# Patient Record
Sex: MALE | Race: BLACK OR AFRICAN AMERICAN | NOT HISPANIC OR LATINO | Employment: OTHER | URBAN - METROPOLITAN AREA
[De-identification: names, ages, dates, MRNs, and addresses within clinical notes are randomized per-mention and may not be internally consistent; named-entity substitution may affect disease eponyms.]

---

## 2018-05-22 PROBLEM — Z12.11 SCREENING FOR COLORECTAL CANCER: Status: ACTIVE | Noted: 2018-05-22

## 2018-05-22 PROBLEM — Z12.12 SCREENING FOR COLORECTAL CANCER: Status: ACTIVE | Noted: 2018-05-22

## 2018-05-29 ENCOUNTER — TELEPHONE (OUTPATIENT)
Dept: SMOKING CESSATION | Facility: CLINIC | Age: 63
End: 2018-05-29

## 2018-05-29 ENCOUNTER — CLINICAL SUPPORT (OUTPATIENT)
Dept: SMOKING CESSATION | Facility: CLINIC | Age: 63
End: 2018-05-29
Payer: COMMERCIAL

## 2018-05-29 DIAGNOSIS — F17.210 MODERATE SMOKER (20 OR LESS PER DAY): Primary | ICD-10-CM

## 2018-05-29 PROCEDURE — 99404 PREV MED CNSL INDIV APPRX 60: CPT | Mod: S$GLB,,,

## 2018-05-29 RX ORDER — VARENICLINE TARTRATE 0.5 (11)-1
KIT ORAL
Qty: 1 PACKAGE | Refills: 0 | Status: SHIPPED | OUTPATIENT
Start: 2018-05-29 | End: 2018-06-29

## 2018-05-29 NOTE — TELEPHONE ENCOUNTER
Attempted to contact pt to inform pt that appt will be held in Admit Dept; unable to leave message

## 2018-05-29 NOTE — PROGRESS NOTES
Patient currently smoking a pack per day and will begin 1:1 cessation therapy with CTTS. Patient will begin prescribed tobacco cessation medication regimen of starter pack of Chantix.

## 2018-07-09 ENCOUNTER — TELEPHONE (OUTPATIENT)
Dept: SMOKING CESSATION | Facility: CLINIC | Age: 63
End: 2018-07-09

## 2018-08-17 ENCOUNTER — TELEPHONE (OUTPATIENT)
Dept: SMOKING CESSATION | Facility: CLINIC | Age: 63
End: 2018-08-17

## 2018-09-05 ENCOUNTER — TELEPHONE (OUTPATIENT)
Dept: SMOKING CESSATION | Facility: CLINIC | Age: 63
End: 2018-09-05

## 2018-09-18 ENCOUNTER — TELEPHONE (OUTPATIENT)
Dept: SMOKING CESSATION | Facility: CLINIC | Age: 63
End: 2018-09-18

## 2018-11-12 ENCOUNTER — TELEPHONE (OUTPATIENT)
Dept: SMOKING CESSATION | Facility: CLINIC | Age: 63
End: 2018-11-12

## 2018-11-26 ENCOUNTER — TELEPHONE (OUTPATIENT)
Dept: SMOKING CESSATION | Facility: CLINIC | Age: 63
End: 2018-11-26

## 2019-06-18 PROBLEM — J44.1 COPD EXACERBATION: Status: ACTIVE | Noted: 2019-06-18

## 2019-06-18 PROBLEM — I10 HTN (HYPERTENSION): Status: ACTIVE | Noted: 2019-06-18

## 2019-06-18 PROBLEM — Z72.0 TOBACCO ABUSE: Status: ACTIVE | Noted: 2019-06-18

## 2019-06-18 PROBLEM — J44.9 COPD (CHRONIC OBSTRUCTIVE PULMONARY DISEASE): Status: ACTIVE | Noted: 2019-06-18

## 2019-06-18 PROBLEM — F10.10 ALCOHOL ABUSE: Status: ACTIVE | Noted: 2019-06-18

## 2019-06-18 PROBLEM — F19.10 DRUG ABUSE: Status: ACTIVE | Noted: 2019-06-18

## 2019-06-18 PROBLEM — E78.5 HLD (HYPERLIPIDEMIA): Status: ACTIVE | Noted: 2019-06-18

## 2019-06-18 PROBLEM — R07.9 CHEST PAIN: Status: ACTIVE | Noted: 2019-06-18

## 2019-06-21 ENCOUNTER — CLINICAL SUPPORT (OUTPATIENT)
Dept: SMOKING CESSATION | Facility: CLINIC | Age: 64
End: 2019-06-21
Payer: COMMERCIAL

## 2019-06-21 DIAGNOSIS — F17.200 NICOTINE DEPENDENCE: Primary | ICD-10-CM

## 2019-06-21 PROCEDURE — 99407 BEHAV CHNG SMOKING > 10 MIN: CPT | Mod: S$GLB,,,

## 2019-06-21 PROCEDURE — 99407 PR TOBACCO USE CESSATION INTENSIVE >10 MINUTES: ICD-10-PCS | Mod: S$GLB,,,

## 2019-06-21 NOTE — PROGRESS NOTES
Successful contact with patient regarding tobacco cessation quit #1. Pt states, he was unable to become tobacco free, he currently smoke three cigarettes per day, and he's determined to quit. Pt schedule for quit #2 on 7/11/2019. Pt informed of his benefit status, future telephone follow ups, and contact information. Will update the tobacco cessation smart form for 12 month on quit #1 and resolve the episode.

## 2019-07-15 ENCOUNTER — TELEPHONE (OUTPATIENT)
Dept: SMOKING CESSATION | Facility: CLINIC | Age: 64
End: 2019-07-15

## 2019-08-08 ENCOUNTER — TELEPHONE (OUTPATIENT)
Dept: SMOKING CESSATION | Facility: CLINIC | Age: 64
End: 2019-08-08

## 2019-09-09 PROBLEM — J44.9 STAGE 2 MODERATE COPD BY GOLD CLASSIFICATION: Chronic | Status: ACTIVE | Noted: 2019-06-18

## 2021-02-17 ENCOUNTER — HISTORICAL (OUTPATIENT)
Dept: ADMINISTRATIVE | Facility: HOSPITAL | Age: 66
End: 2021-02-17

## 2021-02-17 LAB — SARS-COV-2 RNA AMPLIFICATION, QUAL: NEGATIVE

## 2022-01-01 ENCOUNTER — ANESTHESIA EVENT (OUTPATIENT)
Dept: SURGERY | Facility: HOSPITAL | Age: 67
End: 2022-01-01
Payer: MEDICARE

## 2022-01-01 ENCOUNTER — OFFICE VISIT (OUTPATIENT)
Dept: SPINE | Facility: CLINIC | Age: 67
End: 2022-01-01
Payer: MEDICARE

## 2022-01-01 ENCOUNTER — HOSPITAL ENCOUNTER (OUTPATIENT)
Dept: RADIOLOGY | Facility: HOSPITAL | Age: 67
Discharge: HOME | End: 2022-05-16
Attending: ORTHOPAEDIC SURGERY
Payer: MEDICARE

## 2022-01-01 ENCOUNTER — HOSPITAL ENCOUNTER (EMERGENCY)
Facility: HOSPITAL | Age: 67
Discharge: HOME | End: 2022-11-17
Attending: FAMILY MEDICINE
Payer: MEDICARE

## 2022-01-01 ENCOUNTER — HOSPITAL ENCOUNTER (OUTPATIENT)
Dept: RADIOLOGY | Facility: HOSPITAL | Age: 67
Discharge: HOME | End: 2022-07-05
Attending: NURSE PRACTITIONER
Payer: MEDICARE

## 2022-01-01 ENCOUNTER — OUTSIDE PLACE OF SERVICE (OUTPATIENT)
Dept: SURGERY | Facility: CLINIC | Age: 67
End: 2022-01-01
Payer: MEDICARE

## 2022-01-01 ENCOUNTER — TELEPHONE (OUTPATIENT)
Dept: EMERGENCY MEDICINE | Facility: HOSPITAL | Age: 67
End: 2022-01-01
Payer: MEDICARE

## 2022-01-01 ENCOUNTER — HOSPITAL ENCOUNTER (INPATIENT)
Facility: HOSPITAL | Age: 67
LOS: 8 days | DRG: 871 | End: 2022-12-03
Attending: EMERGENCY MEDICINE | Admitting: INTERNAL MEDICINE
Payer: MEDICARE

## 2022-01-01 ENCOUNTER — TELEPHONE (OUTPATIENT)
Dept: FAMILY MEDICINE | Facility: CLINIC | Age: 67
End: 2022-01-01
Payer: MEDICARE

## 2022-01-01 ENCOUNTER — OFFICE VISIT (OUTPATIENT)
Dept: FAMILY MEDICINE | Facility: CLINIC | Age: 67
End: 2022-01-01
Payer: MEDICARE

## 2022-01-01 ENCOUNTER — HOSPITAL ENCOUNTER (OUTPATIENT)
Dept: RADIOLOGY | Facility: HOSPITAL | Age: 67
Discharge: HOME | End: 2022-10-27
Attending: NURSE PRACTITIONER
Payer: MEDICARE

## 2022-01-01 ENCOUNTER — ANESTHESIA (OUTPATIENT)
Dept: SURGERY | Facility: HOSPITAL | Age: 67
End: 2022-01-01
Payer: MEDICARE

## 2022-01-01 ENCOUNTER — LAB REQUISITION (OUTPATIENT)
Dept: LAB | Facility: HOSPITAL | Age: 67
End: 2022-01-01
Payer: MEDICARE

## 2022-01-01 ENCOUNTER — CLINICAL SUPPORT (OUTPATIENT)
Dept: CARDIOLOGY | Facility: CLINIC | Age: 67
End: 2022-01-01
Attending: SURGERY
Payer: MEDICARE

## 2022-01-01 ENCOUNTER — OFFICE VISIT (OUTPATIENT)
Dept: SURGERY | Facility: CLINIC | Age: 67
End: 2022-01-01
Attending: SURGERY
Payer: MEDICARE

## 2022-01-01 ENCOUNTER — HOSPITAL ENCOUNTER (OUTPATIENT)
Dept: RADIOLOGY | Facility: HOSPITAL | Age: 67
Discharge: HOME | End: 2022-04-08
Attending: NURSE PRACTITIONER
Payer: COMMERCIAL

## 2022-01-01 ENCOUNTER — OFFICE VISIT (OUTPATIENT)
Dept: OTOLARYNGOLOGY | Facility: CLINIC | Age: 67
End: 2022-01-01
Payer: MEDICARE

## 2022-01-01 ENCOUNTER — HOSPITAL ENCOUNTER (EMERGENCY)
Facility: HOSPITAL | Age: 67
Discharge: HOME | End: 2022-04-21
Attending: FAMILY MEDICINE
Payer: MEDICARE

## 2022-01-01 ENCOUNTER — OFFICE VISIT (OUTPATIENT)
Dept: FAMILY MEDICINE | Facility: CLINIC | Age: 67
End: 2022-01-01
Payer: MEDICAID

## 2022-01-01 ENCOUNTER — HOSPITAL ENCOUNTER (OUTPATIENT)
Facility: HOSPITAL | Age: 67
Discharge: HOME | End: 2022-08-18
Attending: ORTHOPAEDIC SURGERY | Admitting: ORTHOPAEDIC SURGERY
Payer: MEDICARE

## 2022-01-01 ENCOUNTER — HOSPITAL ENCOUNTER (OUTPATIENT)
Dept: RADIOLOGY | Facility: HOSPITAL | Age: 67
Discharge: HOME | End: 2022-03-09
Attending: ORTHOPAEDIC SURGERY
Payer: MEDICARE

## 2022-01-01 VITALS
HEART RATE: 126 BPM | RESPIRATION RATE: 16 BRPM | DIASTOLIC BLOOD PRESSURE: 95 MMHG | BODY MASS INDEX: 17.72 KG/M2 | TEMPERATURE: 97 F | SYSTOLIC BLOOD PRESSURE: 123 MMHG | OXYGEN SATURATION: 100 % | WEIGHT: 120 LBS

## 2022-01-01 VITALS
DIASTOLIC BLOOD PRESSURE: 82 MMHG | HEART RATE: 87 BPM | TEMPERATURE: 97 F | SYSTOLIC BLOOD PRESSURE: 130 MMHG | OXYGEN SATURATION: 95 % | RESPIRATION RATE: 18 BRPM | HEIGHT: 69 IN | BODY MASS INDEX: 17.77 KG/M2 | WEIGHT: 120 LBS

## 2022-01-01 VITALS
SYSTOLIC BLOOD PRESSURE: 132 MMHG | BODY MASS INDEX: 17.77 KG/M2 | WEIGHT: 120 LBS | TEMPERATURE: 98 F | HEART RATE: 83 BPM | RESPIRATION RATE: 27 BRPM | DIASTOLIC BLOOD PRESSURE: 87 MMHG | OXYGEN SATURATION: 95 % | HEIGHT: 69 IN

## 2022-01-01 VITALS
BODY MASS INDEX: 16.74 KG/M2 | HEART RATE: 71 BPM | DIASTOLIC BLOOD PRESSURE: 88 MMHG | TEMPERATURE: 98 F | OXYGEN SATURATION: 95 % | SYSTOLIC BLOOD PRESSURE: 150 MMHG | WEIGHT: 113 LBS | HEIGHT: 69 IN | RESPIRATION RATE: 18 BRPM

## 2022-01-01 VITALS
TEMPERATURE: 98 F | RESPIRATION RATE: 18 BRPM | HEART RATE: 66 BPM | OXYGEN SATURATION: 100 % | WEIGHT: 118.38 LBS | HEIGHT: 69 IN | SYSTOLIC BLOOD PRESSURE: 140 MMHG | DIASTOLIC BLOOD PRESSURE: 90 MMHG | BODY MASS INDEX: 17.53 KG/M2

## 2022-01-01 VITALS — WEIGHT: 118 LBS | BODY MASS INDEX: 17.48 KG/M2 | HEIGHT: 69 IN

## 2022-01-01 VITALS
WEIGHT: 114 LBS | RESPIRATION RATE: 18 BRPM | TEMPERATURE: 98 F | BODY MASS INDEX: 16.88 KG/M2 | HEART RATE: 85 BPM | HEIGHT: 69 IN | SYSTOLIC BLOOD PRESSURE: 152 MMHG | OXYGEN SATURATION: 86 % | DIASTOLIC BLOOD PRESSURE: 98 MMHG

## 2022-01-01 VITALS
HEART RATE: 71 BPM | WEIGHT: 126.13 LBS | RESPIRATION RATE: 17 BRPM | BODY MASS INDEX: 18.06 KG/M2 | DIASTOLIC BLOOD PRESSURE: 38 MMHG | HEIGHT: 70 IN | OXYGEN SATURATION: 100 % | SYSTOLIC BLOOD PRESSURE: 72 MMHG | TEMPERATURE: 99 F

## 2022-01-01 VITALS
RESPIRATION RATE: 20 BRPM | HEART RATE: 81 BPM | BODY MASS INDEX: 17.33 KG/M2 | HEIGHT: 69 IN | DIASTOLIC BLOOD PRESSURE: 122 MMHG | SYSTOLIC BLOOD PRESSURE: 186 MMHG | TEMPERATURE: 98 F | WEIGHT: 117 LBS | OXYGEN SATURATION: 97 %

## 2022-01-01 DIAGNOSIS — M25.422 ELBOW SWELLING, LEFT: ICD-10-CM

## 2022-01-01 DIAGNOSIS — Z12.11 SCREENING FOR COLORECTAL CANCER: ICD-10-CM

## 2022-01-01 DIAGNOSIS — R07.9 CHEST PAIN: ICD-10-CM

## 2022-01-01 DIAGNOSIS — R10.9 ABDOMINAL PAIN, UNSPECIFIED ABDOMINAL LOCATION: Primary | ICD-10-CM

## 2022-01-01 DIAGNOSIS — K11.8 SUBMANDIBULAR DUCT OBSTRUCTION: Primary | ICD-10-CM

## 2022-01-01 DIAGNOSIS — Z11.52 ENCOUNTER FOR SCREENING LABORATORY TESTING FOR COVID-19 VIRUS: ICD-10-CM

## 2022-01-01 DIAGNOSIS — K40.00 BILATERAL INGUINAL HERNIA, WITH OBSTRUCTION, WITHOUT GANGRENE, NOT SPECIFIED AS RECURRENT: ICD-10-CM

## 2022-01-01 DIAGNOSIS — K40.90 UNILATERAL INGUINAL HERNIA WITHOUT OBSTRUCTION OR GANGRENE, RECURRENCE NOT SPECIFIED: Primary | ICD-10-CM

## 2022-01-01 DIAGNOSIS — S30.1XXA ABDOMINAL WALL SEROMA, INITIAL ENCOUNTER: Primary | ICD-10-CM

## 2022-01-01 DIAGNOSIS — M70.22 OLECRANON BURSITIS OF LEFT ELBOW: ICD-10-CM

## 2022-01-01 DIAGNOSIS — R79.89 ELEVATED TROPONIN: ICD-10-CM

## 2022-01-01 DIAGNOSIS — M54.12 CERVICAL RADICULOPATHY: ICD-10-CM

## 2022-01-01 DIAGNOSIS — Z72.0 TOBACCO ABUSE: ICD-10-CM

## 2022-01-01 DIAGNOSIS — J02.9 SORE THROAT: Primary | ICD-10-CM

## 2022-01-01 DIAGNOSIS — R63.4 WEIGHT LOSS: ICD-10-CM

## 2022-01-01 DIAGNOSIS — J11.1 INFLUENZA: Primary | ICD-10-CM

## 2022-01-01 DIAGNOSIS — K59.00 CONSTIPATION, UNSPECIFIED CONSTIPATION TYPE: ICD-10-CM

## 2022-01-01 DIAGNOSIS — I46.9 CARDIAC ARREST: ICD-10-CM

## 2022-01-01 DIAGNOSIS — R10.9 ABDOMINAL PAIN, UNSPECIFIED ABDOMINAL LOCATION: ICD-10-CM

## 2022-01-01 DIAGNOSIS — M70.22 OLECRANON BURSITIS OF LEFT ELBOW: Primary | Chronic | ICD-10-CM

## 2022-01-01 DIAGNOSIS — Z11.52 ENCOUNTER FOR SCREENING FOR COVID-19: Primary | ICD-10-CM

## 2022-01-01 DIAGNOSIS — R06.02 SOB (SHORTNESS OF BREATH) ON EXERTION: ICD-10-CM

## 2022-01-01 DIAGNOSIS — R10.11 RIGHT UPPER QUADRANT ABDOMINAL PAIN: Primary | ICD-10-CM

## 2022-01-01 DIAGNOSIS — R59.0 LOCALIZED ENLARGED LYMPH NODES: ICD-10-CM

## 2022-01-01 DIAGNOSIS — K92.1 BLACK STOOLS: ICD-10-CM

## 2022-01-01 DIAGNOSIS — K43.9 HERNIA OF ABDOMINAL WALL: ICD-10-CM

## 2022-01-01 DIAGNOSIS — K11.8 SUBMANDIBULAR DUCT OBSTRUCTION: ICD-10-CM

## 2022-01-01 DIAGNOSIS — K40.90 UNILATERAL INGUINAL HERNIA WITHOUT OBSTRUCTION OR GANGRENE, RECURRENCE NOT SPECIFIED: ICD-10-CM

## 2022-01-01 DIAGNOSIS — I10 HTN (HYPERTENSION): ICD-10-CM

## 2022-01-01 DIAGNOSIS — K59.00 CONSTIPATION: ICD-10-CM

## 2022-01-01 DIAGNOSIS — M25.422 ELBOW EFFUSION, LEFT: ICD-10-CM

## 2022-01-01 DIAGNOSIS — M54.12 CERVICAL RADICULOPATHY: Primary | ICD-10-CM

## 2022-01-01 DIAGNOSIS — I46.9 CARDIOPULMONARY ARREST: ICD-10-CM

## 2022-01-01 DIAGNOSIS — N17.9 AKI (ACUTE KIDNEY INJURY): ICD-10-CM

## 2022-01-01 DIAGNOSIS — Z12.12 SCREENING FOR COLORECTAL CANCER: ICD-10-CM

## 2022-01-01 DIAGNOSIS — M50.30 DDD (DEGENERATIVE DISC DISEASE), CERVICAL: ICD-10-CM

## 2022-01-01 DIAGNOSIS — F19.10 DRUG ABUSE: ICD-10-CM

## 2022-01-01 DIAGNOSIS — R10.11 RIGHT UPPER QUADRANT ABDOMINAL PAIN: ICD-10-CM

## 2022-01-01 DIAGNOSIS — R06.02 SOB (SHORTNESS OF BREATH): ICD-10-CM

## 2022-01-01 DIAGNOSIS — J44.9 STAGE 2 MODERATE COPD BY GOLD CLASSIFICATION: Chronic | ICD-10-CM

## 2022-01-01 DIAGNOSIS — E78.5 HLD (HYPERLIPIDEMIA): ICD-10-CM

## 2022-01-01 DIAGNOSIS — M25.522 LEFT ELBOW PAIN: ICD-10-CM

## 2022-01-01 DIAGNOSIS — F10.10 ALCOHOL ABUSE: ICD-10-CM

## 2022-01-01 DIAGNOSIS — I42.9 CARDIOMYOPATHY, UNSPECIFIED TYPE: ICD-10-CM

## 2022-01-01 DIAGNOSIS — I50.814 BIVENTRICULAR HEART FAILURE WITH REDUCED LEFT VENTRICULAR FUNCTION: ICD-10-CM

## 2022-01-01 LAB
ABO + RH BLD: NORMAL
ABO + RH BLD: NORMAL
ABORH RETYPE: NORMAL
ALBUMIN SERPL BCP-MCNC: 1.9 G/DL (ref 3.5–5)
ALBUMIN SERPL BCP-MCNC: 1.9 G/DL (ref 3.5–5)
ALBUMIN SERPL BCP-MCNC: 2 G/DL (ref 3.5–5)
ALBUMIN SERPL BCP-MCNC: 2.4 G/DL (ref 3.5–5)
ALBUMIN SERPL BCP-MCNC: 2.6 G/DL (ref 3.5–5)
ALBUMIN SERPL BCP-MCNC: 2.6 G/DL (ref 3.5–5)
ALBUMIN SERPL BCP-MCNC: 2.7 G/DL (ref 3.5–5)
ALBUMIN SERPL BCP-MCNC: 2.8 G/DL (ref 3.5–5)
ALBUMIN SERPL BCP-MCNC: 2.8 G/DL (ref 3.5–5)
ALBUMIN SERPL BCP-MCNC: 3.5 G/DL (ref 3.5–5)
ALBUMIN SERPL BCP-MCNC: 3.6 G/DL (ref 3.5–5)
ALBUMIN SERPL BCP-MCNC: 3.9 G/DL (ref 3.5–5)
ALBUMIN/GLOB SERPL: 0.5 {RATIO}
ALBUMIN/GLOB SERPL: 0.6 {RATIO}
ALBUMIN/GLOB SERPL: 0.6 {RATIO}
ALBUMIN/GLOB SERPL: 0.7 {RATIO}
ALBUMIN/GLOB SERPL: 0.8 {RATIO}
ALBUMIN/GLOB SERPL: 0.8 {RATIO}
ALBUMIN/GLOB SERPL: 1 {RATIO}
ALP SERPL-CCNC: 102 U/L (ref 45–115)
ALP SERPL-CCNC: 107 U/L (ref 45–115)
ALP SERPL-CCNC: 60 U/L (ref 45–115)
ALP SERPL-CCNC: 61 U/L (ref 45–115)
ALP SERPL-CCNC: 66 U/L (ref 45–115)
ALP SERPL-CCNC: 73 U/L (ref 45–115)
ALP SERPL-CCNC: 73 U/L (ref 45–115)
ALP SERPL-CCNC: 78 U/L (ref 45–115)
ALP SERPL-CCNC: 87 U/L (ref 45–115)
ALP SERPL-CCNC: 87 U/L (ref 45–115)
ALP SERPL-CCNC: 89 U/L (ref 45–115)
ALP SERPL-CCNC: 93 U/L (ref 45–115)
ALT SERPL W P-5'-P-CCNC: 122 U/L (ref 16–61)
ALT SERPL W P-5'-P-CCNC: 20 U/L (ref 16–61)
ALT SERPL W P-5'-P-CCNC: 24 U/L (ref 16–61)
ALT SERPL W P-5'-P-CCNC: 32 U/L (ref 16–61)
ALT SERPL W P-5'-P-CCNC: 48 U/L (ref 16–61)
ALT SERPL W P-5'-P-CCNC: 48 U/L (ref 16–61)
ALT SERPL W P-5'-P-CCNC: 49 U/L (ref 16–61)
ALT SERPL W P-5'-P-CCNC: 54 U/L (ref 16–61)
ALT SERPL W P-5'-P-CCNC: 59 U/L (ref 16–61)
ALT SERPL W P-5'-P-CCNC: 69 U/L (ref 16–61)
ALT SERPL W P-5'-P-CCNC: 76 U/L (ref 16–61)
ALT SERPL W P-5'-P-CCNC: 85 U/L (ref 16–61)
AMPHET UR QL SCN: NEGATIVE
AMYLASE SERPL-CCNC: 55 U/L (ref 25–115)
ANION GAP SERPL CALCULATED.3IONS-SCNC: 10 MMOL/L (ref 7–16)
ANION GAP SERPL CALCULATED.3IONS-SCNC: 11 MMOL/L (ref 7–16)
ANION GAP SERPL CALCULATED.3IONS-SCNC: 12 MMOL/L (ref 7–16)
ANION GAP SERPL CALCULATED.3IONS-SCNC: 13 MMOL/L (ref 7–16)
ANION GAP SERPL CALCULATED.3IONS-SCNC: 14 MMOL/L (ref 7–16)
ANION GAP SERPL CALCULATED.3IONS-SCNC: 17 MMOL/L (ref 7–16)
ANION GAP SERPL CALCULATED.3IONS-SCNC: 9 MMOL/L (ref 7–16)
ANISOCYTOSIS BLD QL SMEAR: ABNORMAL
AORTIC ROOT ANNULUS: 2.6 CM
AORTIC VALVE CUSP SEPERATION: 1.48 CM
APTT PPP: 119.9 SECONDS (ref 25.2–37.3)
APTT PPP: 184.1 SECONDS (ref 25.2–37.3)
APTT PPP: 28.1 SECONDS (ref 25.2–37.3)
APTT PPP: 46.1 SECONDS (ref 25.2–37.3)
APTT PPP: 61 SECONDS (ref 25.2–37.3)
APTT PPP: 70.4 SECONDS (ref 25.2–37.3)
APTT PPP: 71.3 SECONDS (ref 25.2–37.3)
APTT PPP: >200 SECONDS (ref 25.2–37.3)
AST SERPL W P-5'-P-CCNC: 14 U/L (ref 15–37)
AST SERPL W P-5'-P-CCNC: 156 U/L (ref 15–37)
AST SERPL W P-5'-P-CCNC: 19 U/L (ref 15–37)
AST SERPL W P-5'-P-CCNC: 231 U/L (ref 15–37)
AST SERPL W P-5'-P-CCNC: 29 U/L (ref 15–37)
AST SERPL W P-5'-P-CCNC: 46 U/L (ref 15–37)
AST SERPL W P-5'-P-CCNC: 47 U/L (ref 15–37)
AST SERPL W P-5'-P-CCNC: 49 U/L (ref 15–37)
AST SERPL W P-5'-P-CCNC: 62 U/L (ref 15–37)
AST SERPL W P-5'-P-CCNC: 64 U/L (ref 15–37)
AST SERPL W P-5'-P-CCNC: 65 U/L (ref 15–37)
AST SERPL W P-5'-P-CCNC: 94 U/L (ref 15–37)
AV INDEX (PROSTH): 0.63
AV MEAN GRADIENT: 1 MMHG
AV PEAK GRADIENT: 1 MMHG
AV VALVE AREA: 2.16 CM2
AV VELOCITY RATIO: 0.67
BACTERIA #/AREA URNS HPF: ABNORMAL /HPF
BACTERIA BLD CULT: NORMAL
BARBITURATES UR QL SCN: NEGATIVE
BASOPHILS # BLD AUTO: 0 K/UL (ref 0–0.2)
BASOPHILS # BLD AUTO: 0 K/UL (ref 0–0.2)
BASOPHILS # BLD AUTO: 0.01 K/UL (ref 0–0.2)
BASOPHILS # BLD AUTO: 0.02 K/UL (ref 0–0.2)
BASOPHILS # BLD AUTO: 0.03 K/UL (ref 0–0.2)
BASOPHILS # BLD AUTO: 0.04 K/UL (ref 0–0.2)
BASOPHILS # BLD AUTO: 0.06 K/UL (ref 0–0.2)
BASOPHILS # BLD AUTO: 0.07 K/UL (ref 0–0.2)
BASOPHILS NFR BLD AUTO: 0 % (ref 0–1)
BASOPHILS NFR BLD AUTO: 0 % (ref 0–1)
BASOPHILS NFR BLD AUTO: 0.1 % (ref 0–1)
BASOPHILS NFR BLD AUTO: 0.5 % (ref 0–1)
BASOPHILS NFR BLD AUTO: 0.6 % (ref 0–1)
BASOPHILS NFR BLD AUTO: 0.8 % (ref 0–1)
BASOPHILS NFR BLD AUTO: 1.1 % (ref 0–1)
BENZODIAZ METAB UR QL SCN: POSITIVE
BILIRUB DIRECT SERPL-MCNC: 0.2 MG/DL (ref 0–0.2)
BILIRUB SERPL-MCNC: 0.2 MG/DL (ref 0–1.2)
BILIRUB SERPL-MCNC: 0.4 MG/DL (ref ?–1.2)
BILIRUB SERPL-MCNC: 0.4 MG/DL (ref ?–1.2)
BILIRUB SERPL-MCNC: 0.5 MG/DL (ref 0–1.2)
BILIRUB SERPL-MCNC: 0.5 MG/DL (ref ?–1.2)
BILIRUB SERPL-MCNC: 0.6 MG/DL (ref ?–1.2)
BILIRUB SERPL-MCNC: 0.7 MG/DL (ref ?–1.2)
BILIRUB UR QL STRIP: NEGATIVE
BILIRUB UR QL STRIP: NEGATIVE
BLD PROD TYP BPU: NORMAL
BLD PROD TYP BPU: NORMAL
BLOOD UNIT EXPIRATION DATE: NORMAL
BLOOD UNIT EXPIRATION DATE: NORMAL
BLOOD UNIT TYPE CODE: 5100
BLOOD UNIT TYPE CODE: 5100
BUN SERPL-MCNC: 11 MG/DL (ref 7–18)
BUN SERPL-MCNC: 19 MG/DL (ref 7–18)
BUN SERPL-MCNC: 19 MG/DL (ref 7–18)
BUN SERPL-MCNC: 26 MG/DL (ref 7–18)
BUN SERPL-MCNC: 28 MG/DL (ref 7–18)
BUN SERPL-MCNC: 28 MG/DL (ref 7–18)
BUN SERPL-MCNC: 33 MG/DL (ref 7–18)
BUN SERPL-MCNC: 34 MG/DL (ref 7–18)
BUN SERPL-MCNC: 34 MG/DL (ref 7–18)
BUN SERPL-MCNC: 35 MG/DL (ref 7–18)
BUN SERPL-MCNC: 37 MG/DL (ref 7–18)
BUN SERPL-MCNC: 44 MG/DL (ref 7–18)
BUN SERPL-MCNC: 55 MG/DL (ref 7–18)
BUN SERPL-MCNC: 59 MG/DL (ref 7–18)
BUN SERPL-MCNC: 61 MG/DL (ref 7–18)
BUN SERPL-MCNC: 63 MG/DL (ref 7–18)
BUN SERPL-MCNC: 75 MG/DL (ref 7–18)
BUN SERPL-MCNC: 9 MG/DL (ref 7–18)
BUN/CREAT SERPL: 11 (ref 6–20)
BUN/CREAT SERPL: 11 (ref 6–20)
BUN/CREAT SERPL: 15 (ref 6–20)
BUN/CREAT SERPL: 17 (ref 6–20)
BUN/CREAT SERPL: 17 (ref 6–20)
BUN/CREAT SERPL: 18 (ref 6–20)
BUN/CREAT SERPL: 18 (ref 6–20)
BUN/CREAT SERPL: 19 (ref 6–20)
BUN/CREAT SERPL: 20 (ref 6–20)
BUN/CREAT SERPL: 22 (ref 6–20)
BUN/CREAT SERPL: 22 (ref 6–20)
BUN/CREAT SERPL: 23 (ref 6–20)
BUN/CREAT SERPL: 25 (ref 6–20)
CALCIUM SERPL-MCNC: 10.1 MG/DL (ref 8.5–10.1)
CALCIUM SERPL-MCNC: 7.2 MG/DL (ref 8.5–10.1)
CALCIUM SERPL-MCNC: 7.6 MG/DL (ref 8.5–10.1)
CALCIUM SERPL-MCNC: 7.7 MG/DL (ref 8.5–10.1)
CALCIUM SERPL-MCNC: 7.7 MG/DL (ref 8.5–10.1)
CALCIUM SERPL-MCNC: 7.9 MG/DL (ref 8.5–10.1)
CALCIUM SERPL-MCNC: 7.9 MG/DL (ref 8.5–10.1)
CALCIUM SERPL-MCNC: 8 MG/DL (ref 8.5–10.1)
CALCIUM SERPL-MCNC: 8.1 MG/DL (ref 8.5–10.1)
CALCIUM SERPL-MCNC: 8.1 MG/DL (ref 8.5–10.1)
CALCIUM SERPL-MCNC: 8.3 MG/DL (ref 8.5–10.1)
CALCIUM SERPL-MCNC: 8.3 MG/DL (ref 8.5–10.1)
CALCIUM SERPL-MCNC: 8.4 MG/DL (ref 8.5–10.1)
CALCIUM SERPL-MCNC: 8.5 MG/DL (ref 8.5–10.1)
CALCIUM SERPL-MCNC: 8.8 MG/DL (ref 8.5–10.1)
CALCIUM SERPL-MCNC: 8.9 MG/DL (ref 8.5–10.1)
CALCIUM SERPL-MCNC: 9.3 MG/DL (ref 8.5–10.1)
CALCIUM SERPL-MCNC: 9.7 MG/DL (ref 8.5–10.1)
CANNABINOIDS UR QL SCN: NEGATIVE
CHLORIDE SERPL-SCNC: 101 MMOL/L (ref 98–107)
CHLORIDE SERPL-SCNC: 101 MMOL/L (ref 98–107)
CHLORIDE SERPL-SCNC: 102 MMOL/L (ref 98–107)
CHLORIDE SERPL-SCNC: 102 MMOL/L (ref 98–107)
CHLORIDE SERPL-SCNC: 103 MMOL/L (ref 98–107)
CHLORIDE SERPL-SCNC: 104 MMOL/L (ref 98–107)
CHLORIDE SERPL-SCNC: 105 MMOL/L (ref 98–107)
CHLORIDE SERPL-SCNC: 106 MMOL/L (ref 98–107)
CHLORIDE SERPL-SCNC: 107 MMOL/L (ref 98–107)
CHLORIDE SERPL-SCNC: 93 MMOL/L (ref 98–107)
CHLORIDE SERPL-SCNC: 94 MMOL/L (ref 98–107)
CHLORIDE SERPL-SCNC: 95 MMOL/L (ref 98–107)
CHLORIDE SERPL-SCNC: 95 MMOL/L (ref 98–107)
CHLORIDE SERPL-SCNC: 96 MMOL/L (ref 98–107)
CHLORIDE SERPL-SCNC: 99 MMOL/L (ref 98–107)
CHLORIDE SERPL-SCNC: 99 MMOL/L (ref 98–107)
CK SERPL-CCNC: 274 U/L (ref 39–308)
CLARITY UR: CLEAR
CLARITY UR: CLEAR
CO2 SERPL-SCNC: 25 MMOL/L (ref 21–32)
CO2 SERPL-SCNC: 25 MMOL/L (ref 21–32)
CO2 SERPL-SCNC: 27 MMOL/L (ref 21–32)
CO2 SERPL-SCNC: 28 MMOL/L (ref 21–32)
CO2 SERPL-SCNC: 29 MMOL/L (ref 21–32)
CO2 SERPL-SCNC: 30 MMOL/L (ref 21–32)
CO2 SERPL-SCNC: 32 MMOL/L (ref 21–32)
CO2 SERPL-SCNC: 33 MMOL/L (ref 21–32)
CO2 SERPL-SCNC: 34 MMOL/L (ref 21–32)
COCAINE UR QL SCN: POSITIVE
COLOR UR: NORMAL
COLOR UR: YELLOW
CREAT SERPL-MCNC: 0.84 MG/DL (ref 0.7–1.3)
CREAT SERPL-MCNC: 0.94 MG/DL (ref 0.7–1.3)
CREAT SERPL-MCNC: 0.97 MG/DL (ref 0.7–1.3)
CREAT SERPL-MCNC: 1.09 MG/DL (ref 0.7–1.3)
CREAT SERPL-MCNC: 1.32 MG/DL (ref 0.7–1.3)
CREAT SERPL-MCNC: 1.34 MG/DL (ref 0.7–1.3)
CREAT SERPL-MCNC: 1.45 MG/DL (ref 0.7–1.3)
CREAT SERPL-MCNC: 1.46 MG/DL (ref 0.7–1.3)
CREAT SERPL-MCNC: 1.51 MG/DL (ref 0.7–1.3)
CREAT SERPL-MCNC: 1.52 MG/DL (ref 0.7–1.3)
CREAT SERPL-MCNC: 1.71 MG/DL (ref 0.7–1.3)
CREAT SERPL-MCNC: 1.9 MG/DL (ref 0.7–1.3)
CREAT SERPL-MCNC: 2.51 MG/DL (ref 0.7–1.3)
CREAT SERPL-MCNC: 3.21 MG/DL (ref 0.7–1.3)
CREAT SERPL-MCNC: 3.34 MG/DL (ref 0.7–1.3)
CREAT SERPL-MCNC: 3.37 MG/DL (ref 0.7–1.3)
CREAT SERPL-MCNC: 3.57 MG/DL (ref 0.7–1.3)
CREAT SERPL-MCNC: 3.72 MG/DL (ref 0.7–1.3)
CRENATED CELLS: ABNORMAL
CRENATED CELLS: ABNORMAL
CROSSMATCH INTERPRETATION: NORMAL
CROSSMATCH INTERPRETATION: NORMAL
CRP SERPL-MCNC: 9.23 MG/DL (ref 0–0.8)
CTP QC/QA: YES
CTP QC/QA: YES
CULTURE, LOWER RESPIRATORY: ABNORMAL
CV ECHO LV RWT: 0.8 CM
D DIMER PPP FEU-MCNC: 6.26 ΜG/ML (ref 0–0.47)
DHEA SERPL-MCNC: NORMAL
DIFFERENTIAL METHOD BLD: ABNORMAL
DISPENSE STATUS: NORMAL
DISPENSE STATUS: NORMAL
DOP CALC AO PEAK VEL: 0.6 M/S
DOP CALC AO VTI: 8 CM
DOP CALC LVOT AREA: 3.5 CM2
DOP CALC LVOT DIAMETER: 2.1 CM
DOP CALC LVOT PEAK VEL: 0.4 M/S
DOP CALC LVOT STROKE VOLUME: 17.31 CM3
DOP CALC MV VTI: 96 CM
DOP CALCLVOT PEAK VEL VTI: 5 CM
E WAVE DECELERATION TIME: 167 MSEC
ECHO EF ESTIMATED: 25 %
ECHO LV POSTERIOR WALL: 1.47 CM (ref 0.6–1.1)
EGFR (NO RACE VARIABLE) (RUSH/TITUS): 17 ML/MIN/1.73M²
EGFR (NO RACE VARIABLE) (RUSH/TITUS): 18 ML/MIN/1.73M²
EGFR (NO RACE VARIABLE) (RUSH/TITUS): 19 ML/MIN/1.73M²
EGFR (NO RACE VARIABLE) (RUSH/TITUS): 19 ML/MIN/1.73M²
EGFR (NO RACE VARIABLE) (RUSH/TITUS): 20 ML/MIN/1.73M²
EGFR (NO RACE VARIABLE) (RUSH/TITUS): 27 ML/MIN/1.73M²
EGFR (NO RACE VARIABLE) (RUSH/TITUS): 38 ML/MIN/1.73M²
EGFR (NO RACE VARIABLE) (RUSH/TITUS): 43 ML/MIN/1.73M²
EGFR (NO RACE VARIABLE) (RUSH/TITUS): 49 ML/MIN/1.73M²
EGFR (NO RACE VARIABLE) (RUSH/TITUS): 50 ML/MIN/1.73M²
EGFR (NO RACE VARIABLE) (RUSH/TITUS): 52 ML/MIN/1.73M²
EGFR (NO RACE VARIABLE) (RUSH/TITUS): 52 ML/MIN/1.73M²
EGFR (NO RACE VARIABLE) (RUSH/TITUS): 57 ML/MIN/1.73M²
EGFR (NO RACE VARIABLE) (RUSH/TITUS): 58 ML/MIN/1.73M²
EGFR (NO RACE VARIABLE) (RUSH/TITUS): 85 ML/MIN/1.73M²
EGFR (NO RACE VARIABLE) (RUSH/TITUS): 95 ML/MIN/1.73M²
EJECTION FRACTION: 25 %
EOSINOPHIL # BLD AUTO: 0 K/UL (ref 0–0.5)
EOSINOPHIL # BLD AUTO: 0.01 K/UL (ref 0–0.5)
EOSINOPHIL # BLD AUTO: 0.03 K/UL (ref 0–0.5)
EOSINOPHIL # BLD AUTO: 0.03 K/UL (ref 0–0.5)
EOSINOPHIL # BLD AUTO: 0.04 K/UL (ref 0–0.5)
EOSINOPHIL # BLD AUTO: 0.09 K/UL (ref 0–0.5)
EOSINOPHIL # BLD AUTO: 0.18 K/UL (ref 0–0.5)
EOSINOPHIL NFR BLD AUTO: 0 % (ref 1–4)
EOSINOPHIL NFR BLD AUTO: 0.1 % (ref 1–4)
EOSINOPHIL NFR BLD AUTO: 0.2 % (ref 1–4)
EOSINOPHIL NFR BLD AUTO: 0.4 % (ref 1–4)
EOSINOPHIL NFR BLD AUTO: 0.6 % (ref 1–4)
EOSINOPHIL NFR BLD AUTO: 1.6 % (ref 1–4)
EOSINOPHIL NFR BLD AUTO: 2.9 % (ref 1–4)
ERYTHROCYTE [DISTWIDTH] IN BLOOD BY AUTOMATED COUNT: 13 % (ref 11.5–14.5)
ERYTHROCYTE [DISTWIDTH] IN BLOOD BY AUTOMATED COUNT: 13.2 % (ref 11.5–14.5)
ERYTHROCYTE [DISTWIDTH] IN BLOOD BY AUTOMATED COUNT: 13.5 % (ref 11.5–14.5)
ERYTHROCYTE [DISTWIDTH] IN BLOOD BY AUTOMATED COUNT: 13.7 % (ref 11.5–14.5)
ERYTHROCYTE [DISTWIDTH] IN BLOOD BY AUTOMATED COUNT: 13.9 % (ref 11.5–14.5)
ERYTHROCYTE [DISTWIDTH] IN BLOOD BY AUTOMATED COUNT: 13.9 % (ref 11.5–14.5)
ERYTHROCYTE [DISTWIDTH] IN BLOOD BY AUTOMATED COUNT: 14.1 % (ref 11.5–14.5)
ERYTHROCYTE [DISTWIDTH] IN BLOOD BY AUTOMATED COUNT: 14.1 % (ref 11.5–14.5)
ERYTHROCYTE [DISTWIDTH] IN BLOOD BY AUTOMATED COUNT: 14.4 % (ref 11.5–14.5)
ERYTHROCYTE [DISTWIDTH] IN BLOOD BY AUTOMATED COUNT: 14.5 % (ref 11.5–14.5)
ERYTHROCYTE [DISTWIDTH] IN BLOOD BY AUTOMATED COUNT: 14.6 % (ref 11.5–14.5)
ERYTHROCYTE [SEDIMENTATION RATE] IN BLOOD BY WESTERGREN METHOD: 20 MM/HR (ref 0–20)
EST. AVERAGE GLUCOSE BLD GHB EST-MCNC: 110 MG/DL
ESTROGEN SERPL-MCNC: NORMAL PG/ML
ESTROGEN SERPL-MCNC: NORMAL PG/ML
FLUAV AG NPH QL: POSITIVE
FLUBV AG NPH QL: NEGATIVE
FRACTIONAL SHORTENING: 15 % (ref 28–44)
GLOBULIN SER-MCNC: 3.3 G/DL (ref 2–4)
GLOBULIN SER-MCNC: 3.4 G/DL (ref 2–4)
GLOBULIN SER-MCNC: 3.5 G/DL (ref 2–4)
GLOBULIN SER-MCNC: 3.5 G/DL (ref 2–4)
GLOBULIN SER-MCNC: 3.6 G/DL (ref 2–4)
GLOBULIN SER-MCNC: 3.6 G/DL (ref 2–4)
GLOBULIN SER-MCNC: 3.7 G/DL (ref 2–4)
GLOBULIN SER-MCNC: 3.8 G/DL (ref 2–4)
GLOBULIN SER-MCNC: 3.8 G/DL (ref 2–4)
GLOBULIN SER-MCNC: 3.9 G/DL (ref 2–4)
GLOBULIN SER-MCNC: 4 G/DL (ref 2–4)
GLUCOSE SERPL-MCNC: 110 MG/DL (ref 74–106)
GLUCOSE SERPL-MCNC: 118 MG/DL (ref 74–106)
GLUCOSE SERPL-MCNC: 120 MG/DL (ref 70–105)
GLUCOSE SERPL-MCNC: 121 MG/DL (ref 70–105)
GLUCOSE SERPL-MCNC: 130 MG/DL (ref 74–106)
GLUCOSE SERPL-MCNC: 131 MG/DL (ref 74–106)
GLUCOSE SERPL-MCNC: 134 MG/DL (ref 74–106)
GLUCOSE SERPL-MCNC: 139 MG/DL (ref 74–106)
GLUCOSE SERPL-MCNC: 141 MG/DL (ref 74–106)
GLUCOSE SERPL-MCNC: 145 MG/DL (ref 70–105)
GLUCOSE SERPL-MCNC: 145 MG/DL (ref 74–106)
GLUCOSE SERPL-MCNC: 149 MG/DL (ref 74–106)
GLUCOSE SERPL-MCNC: 151 MG/DL (ref 74–106)
GLUCOSE SERPL-MCNC: 153 MG/DL (ref 74–106)
GLUCOSE SERPL-MCNC: 158 MG/DL (ref 70–105)
GLUCOSE SERPL-MCNC: 158 MG/DL (ref 74–106)
GLUCOSE SERPL-MCNC: 160 MG/DL (ref 74–106)
GLUCOSE SERPL-MCNC: 162 MG/DL (ref 70–105)
GLUCOSE SERPL-MCNC: 167 MG/DL (ref 74–106)
GLUCOSE SERPL-MCNC: 171 MG/DL (ref 74–106)
GLUCOSE SERPL-MCNC: 179 MG/DL (ref 74–106)
GLUCOSE SERPL-MCNC: 183 MG/DL (ref 74–106)
GLUCOSE SERPL-MCNC: 197 MG/DL (ref 74–106)
GLUCOSE SERPL-MCNC: 83 MG/DL (ref 74–106)
GLUCOSE SERPL-MCNC: 84 MG/DL (ref 74–106)
GLUCOSE SERPL-MCNC: 84 MG/DL (ref 74–106)
GLUCOSE SERPL-MCNC: 85 MG/DL (ref 74–106)
GLUCOSE SERPL-MCNC: 92 MG/DL (ref 74–106)
GLUCOSE SERPL-MCNC: 93 MG/DL (ref 74–106)
GLUCOSE UR STRIP-MCNC: 30 MG/DL
GLUCOSE UR STRIP-MCNC: NEGATIVE MG/DL
GRAM STN SPEC: ABNORMAL
HAV IGM SER QL: NORMAL
HBA1C MFR BLD HPLC: 5.9 % (ref 4.5–6.6)
HBV CORE IGM SER QL: NORMAL
HBV SURFACE AG SERPL QL IA: NORMAL
HCO3 UR-SCNC: 24.2 MMOL/L (ref 21–28)
HCO3 UR-SCNC: 26.3 MMOL/L (ref 21–28)
HCO3 UR-SCNC: 30.4 MMOL/L (ref 21–28)
HCO3 UR-SCNC: 30.8 MMOL/L (ref 21–28)
HCT VFR BLD AUTO: 19.7 % (ref 40–54)
HCT VFR BLD AUTO: 20.7 % (ref 40–54)
HCT VFR BLD AUTO: 22.3 % (ref 40–54)
HCT VFR BLD AUTO: 22.5 % (ref 40–54)
HCT VFR BLD AUTO: 23 % (ref 40–54)
HCT VFR BLD AUTO: 23.5 % (ref 40–54)
HCT VFR BLD AUTO: 34.1 % (ref 40–54)
HCT VFR BLD AUTO: 40.7 % (ref 40–54)
HCT VFR BLD AUTO: 41.2 % (ref 40–54)
HCT VFR BLD AUTO: 42.8 % (ref 40–54)
HCT VFR BLD AUTO: 45.1 % (ref 40–54)
HCT VFR BLD AUTO: 45.6 % (ref 40–54)
HCT VFR BLD AUTO: 50.3 % (ref 40–54)
HCT VFR BLD CALC: 45 % (ref 35–51)
HCT VFR BLD CALC: 46 % (ref 35–51)
HCV AB SER QL: NORMAL
HGB BLD-MCNC: 11.3 G/DL (ref 13.5–18)
HGB BLD-MCNC: 12.9 G/DL (ref 13.5–18)
HGB BLD-MCNC: 13.3 G/DL (ref 13.5–18)
HGB BLD-MCNC: 13.9 G/DL (ref 13.5–18)
HGB BLD-MCNC: 14.5 G/DL (ref 13.5–18)
HGB BLD-MCNC: 15.1 G/DL (ref 13.5–18)
HGB BLD-MCNC: 17.1 G/DL (ref 13.5–18)
HGB BLD-MCNC: 6.2 G/DL (ref 13.5–18)
HGB BLD-MCNC: 6.9 G/DL (ref 13.5–18)
HGB BLD-MCNC: 7.4 G/DL (ref 13.5–18)
HGB BLD-MCNC: 7.7 G/DL (ref 13.5–18)
HGB BLD-MCNC: 7.8 G/DL (ref 13.5–18)
HGB BLD-MCNC: 7.8 G/DL (ref 13.5–18)
HYPOCHROMIA BLD QL SMEAR: ABNORMAL
IMM GRANULOCYTES # BLD AUTO: 0.01 K/UL (ref 0–0.04)
IMM GRANULOCYTES # BLD AUTO: 0.02 K/UL (ref 0–0.04)
IMM GRANULOCYTES # BLD AUTO: 0.02 K/UL (ref 0–0.04)
IMM GRANULOCYTES # BLD AUTO: 0.03 K/UL (ref 0–0.04)
IMM GRANULOCYTES # BLD AUTO: 0.03 K/UL (ref 0–0.04)
IMM GRANULOCYTES # BLD AUTO: 0.04 K/UL (ref 0–0.04)
IMM GRANULOCYTES # BLD AUTO: 0.06 K/UL (ref 0–0.04)
IMM GRANULOCYTES # BLD AUTO: 0.07 K/UL (ref 0–0.04)
IMM GRANULOCYTES # BLD AUTO: 0.08 K/UL (ref 0–0.04)
IMM GRANULOCYTES # BLD AUTO: 0.08 K/UL (ref 0–0.04)
IMM GRANULOCYTES # BLD AUTO: 0.09 K/UL (ref 0–0.04)
IMM GRANULOCYTES # BLD AUTO: 0.1 K/UL (ref 0–0.04)
IMM GRANULOCYTES # BLD AUTO: 0.11 K/UL (ref 0–0.04)
IMM GRANULOCYTES NFR BLD: 0.2 % (ref 0–0.4)
IMM GRANULOCYTES NFR BLD: 0.3 % (ref 0–0.4)
IMM GRANULOCYTES NFR BLD: 0.3 % (ref 0–0.4)
IMM GRANULOCYTES NFR BLD: 0.4 % (ref 0–0.4)
IMM GRANULOCYTES NFR BLD: 0.5 % (ref 0–0.4)
IMM GRANULOCYTES NFR BLD: 0.5 % (ref 0–0.4)
IMM GRANULOCYTES NFR BLD: 0.6 % (ref 0–0.4)
IMM GRANULOCYTES NFR BLD: 0.7 % (ref 0–0.4)
IMM GRANULOCYTES NFR BLD: 0.7 % (ref 0–0.4)
IMM GRANULOCYTES NFR BLD: 0.8 % (ref 0–0.4)
IMM GRANULOCYTES NFR BLD: 0.9 % (ref 0–0.4)
INDIRECT COOMBS: NORMAL
INR BLD: 1.18
INR BLD: 1.27
INR BLD: 1.5
INSULIN SERPL-ACNC: NORMAL U[IU]/ML
INSULIN SERPL-ACNC: NORMAL U[IU]/ML
INTERVENTRICULAR SEPTUM: 1.35 CM (ref 0.6–1.1)
IVC OSTIUM: 2.5 CM
KETONES UR STRIP-SCNC: NEGATIVE MG/DL
KETONES UR STRIP-SCNC: NEGATIVE MG/DL
LAB AP CLINICAL INFORMATION: NORMAL
LAB AP CLINICAL INFORMATION: NORMAL
LAB AP GROSS DESCRIPTION: NORMAL
LAB AP GROSS DESCRIPTION: NORMAL
LAB AP LABORATORY NOTES: NORMAL
LAB AP LABORATORY NOTES: NORMAL
LACTATE SERPL-SCNC: 1.9 MMOL/L (ref 0.4–2)
LACTATE SERPL-SCNC: 2.1 MMOL/L (ref 0.4–2)
LACTATE SERPL-SCNC: 2.7 MMOL/L (ref 0.4–2)
LACTATE SERPL-SCNC: 6.8 MMOL/L (ref 0.4–2)
LDH SERPL L TO P-CCNC: 2.5 MMOL/L (ref 0.3–1.2)
LDH SERPL L TO P-CCNC: 6.5 MMOL/L (ref 0.3–1.2)
LEFT ATRIUM SIZE: 3.1 CM
LEFT INTERNAL DIMENSION IN SYSTOLE: 3.12 CM (ref 2.1–4)
LEFT VENTRICLE DIASTOLIC VOLUME: 56.6 ML
LEFT VENTRICLE SYSTOLIC VOLUME: 38.5 ML
LEFT VENTRICULAR INTERNAL DIMENSION IN DIASTOLE: 3.66 CM (ref 3.5–6)
LEFT VENTRICULAR MASS: 186.19 G
LEUKOCYTE ESTERASE UR QL STRIP: NEGATIVE
LEUKOCYTE ESTERASE UR QL STRIP: NEGATIVE
LIPASE SERPL-CCNC: 55 U/L (ref 73–393)
LVOT MG: 0.4 MMHG
LYMPHOCYTES # BLD AUTO: 0.1 K/UL (ref 1–4.8)
LYMPHOCYTES # BLD AUTO: 0.12 K/UL (ref 1–4.8)
LYMPHOCYTES # BLD AUTO: 0.18 K/UL (ref 1–4.8)
LYMPHOCYTES # BLD AUTO: 0.35 K/UL (ref 1–4.8)
LYMPHOCYTES # BLD AUTO: 0.38 K/UL (ref 1–4.8)
LYMPHOCYTES # BLD AUTO: 0.41 K/UL (ref 1–4.8)
LYMPHOCYTES # BLD AUTO: 0.88 K/UL (ref 1–4.8)
LYMPHOCYTES # BLD AUTO: 1.13 K/UL (ref 1–4.8)
LYMPHOCYTES # BLD AUTO: 1.15 K/UL (ref 1–4.8)
LYMPHOCYTES # BLD AUTO: 1.36 K/UL (ref 1–4.8)
LYMPHOCYTES # BLD AUTO: 1.41 K/UL (ref 1–4.8)
LYMPHOCYTES # BLD AUTO: 1.94 K/UL (ref 1–4.8)
LYMPHOCYTES # BLD AUTO: 2.53 K/UL (ref 1–4.8)
LYMPHOCYTES NFR BLD AUTO: 1 % (ref 27–41)
LYMPHOCYTES NFR BLD AUTO: 1.1 % (ref 27–41)
LYMPHOCYTES NFR BLD AUTO: 1.6 % (ref 27–41)
LYMPHOCYTES NFR BLD AUTO: 11.6 % (ref 27–41)
LYMPHOCYTES NFR BLD AUTO: 16.3 % (ref 27–41)
LYMPHOCYTES NFR BLD AUTO: 2.4 % (ref 27–41)
LYMPHOCYTES NFR BLD AUTO: 2.4 % (ref 27–41)
LYMPHOCYTES NFR BLD AUTO: 2.9 % (ref 27–41)
LYMPHOCYTES NFR BLD AUTO: 21.7 % (ref 27–41)
LYMPHOCYTES NFR BLD AUTO: 24.4 % (ref 27–41)
LYMPHOCYTES NFR BLD AUTO: 31.2 % (ref 27–41)
LYMPHOCYTES NFR BLD AUTO: 33.4 % (ref 27–41)
LYMPHOCYTES NFR BLD AUTO: 6.9 % (ref 27–41)
LYMPHOCYTES NFR BLD MANUAL: 1 % (ref 27–41)
LYMPHOCYTES NFR BLD MANUAL: 11 % (ref 27–41)
LYMPHOCYTES NFR BLD MANUAL: 2 % (ref 27–41)
LYMPHOCYTES NFR BLD MANUAL: 2 % (ref 27–41)
LYMPHOCYTES NFR BLD MANUAL: 3 % (ref 27–41)
LYMPHOCYTES NFR BLD MANUAL: 3 % (ref 27–41)
LYMPHOCYTES NFR BLD MANUAL: 32 % (ref 27–41)
LYMPHOCYTES NFR BLD MANUAL: 4 % (ref 27–41)
LYMPHOCYTES NFR BLD MANUAL: 8 % (ref 27–41)
MAGNESIUM SERPL-MCNC: 1.9 MG/DL (ref 1.7–2.3)
MAGNESIUM SERPL-MCNC: 2 MG/DL (ref 1.7–2.3)
MAGNESIUM SERPL-MCNC: 2.2 MG/DL (ref 1.7–2.3)
MAGNESIUM SERPL-MCNC: 2.3 MG/DL (ref 1.7–2.3)
MAGNESIUM SERPL-MCNC: 2.4 MG/DL (ref 1.7–2.3)
MAGNESIUM SERPL-MCNC: 2.5 MG/DL (ref 1.7–2.3)
MAGNESIUM SERPL-MCNC: 2.6 MG/DL (ref 1.7–2.3)
MAGNESIUM SERPL-MCNC: 2.7 MG/DL (ref 1.7–2.3)
MAGNESIUM SERPL-MCNC: 3.3 MG/DL (ref 1.7–2.3)
MAGNESIUM SERPL-MCNC: 4.6 MG/DL (ref 1.7–2.3)
MCH RBC QN AUTO: 29 PG (ref 27–31)
MCH RBC QN AUTO: 29.1 PG (ref 27–31)
MCH RBC QN AUTO: 29.2 PG (ref 27–31)
MCH RBC QN AUTO: 29.3 PG (ref 27–31)
MCH RBC QN AUTO: 29.4 PG (ref 27–31)
MCH RBC QN AUTO: 29.4 PG (ref 27–31)
MCH RBC QN AUTO: 29.5 PG (ref 27–31)
MCH RBC QN AUTO: 29.7 PG (ref 27–31)
MCH RBC QN AUTO: 30 PG (ref 27–31)
MCH RBC QN AUTO: 30.3 PG (ref 27–31)
MCH RBC QN AUTO: 30.4 PG (ref 27–31)
MCHC RBC AUTO-ENTMCNC: 30.8 G/DL (ref 32–36)
MCHC RBC AUTO-ENTMCNC: 31.3 G/DL (ref 32–36)
MCHC RBC AUTO-ENTMCNC: 31.5 G/DL (ref 32–36)
MCHC RBC AUTO-ENTMCNC: 32.7 G/DL (ref 32–36)
MCHC RBC AUTO-ENTMCNC: 33.1 G/DL (ref 32–36)
MCHC RBC AUTO-ENTMCNC: 33.1 G/DL (ref 32–36)
MCHC RBC AUTO-ENTMCNC: 33.2 G/DL (ref 32–36)
MCHC RBC AUTO-ENTMCNC: 33.2 G/DL (ref 32–36)
MCHC RBC AUTO-ENTMCNC: 33.3 G/DL (ref 32–36)
MCHC RBC AUTO-ENTMCNC: 33.5 G/DL (ref 32–36)
MCHC RBC AUTO-ENTMCNC: 33.9 G/DL (ref 32–36)
MCHC RBC AUTO-ENTMCNC: 34 G/DL (ref 32–36)
MCHC RBC AUTO-ENTMCNC: 34.7 G/DL (ref 32–36)
MCV RBC AUTO: 85.6 FL (ref 80–96)
MCV RBC AUTO: 87.1 FL (ref 80–96)
MCV RBC AUTO: 88.6 FL (ref 80–96)
MCV RBC AUTO: 88.6 FL (ref 80–96)
MCV RBC AUTO: 88.7 FL (ref 80–96)
MCV RBC AUTO: 89 FL (ref 80–96)
MCV RBC AUTO: 89.2 FL (ref 80–96)
MCV RBC AUTO: 89.5 FL (ref 80–96)
MCV RBC AUTO: 89.6 FL (ref 80–96)
MCV RBC AUTO: 91.4 FL (ref 80–96)
MCV RBC AUTO: 93 FL (ref 80–96)
MCV RBC AUTO: 93.4 FL (ref 80–96)
MCV RBC AUTO: 95.1 FL (ref 80–96)
MONOCYTES # BLD AUTO: 0.32 K/UL (ref 0–0.8)
MONOCYTES # BLD AUTO: 0.43 K/UL (ref 0–0.8)
MONOCYTES # BLD AUTO: 0.45 K/UL (ref 0–0.8)
MONOCYTES # BLD AUTO: 0.49 K/UL (ref 0–0.8)
MONOCYTES # BLD AUTO: 0.51 K/UL (ref 0–0.8)
MONOCYTES # BLD AUTO: 0.56 K/UL (ref 0–0.8)
MONOCYTES # BLD AUTO: 0.58 K/UL (ref 0–0.8)
MONOCYTES # BLD AUTO: 0.58 K/UL (ref 0–0.8)
MONOCYTES # BLD AUTO: 0.59 K/UL (ref 0–0.8)
MONOCYTES # BLD AUTO: 0.7 K/UL (ref 0–0.8)
MONOCYTES # BLD AUTO: 0.78 K/UL (ref 0–0.8)
MONOCYTES # BLD AUTO: 1.2 K/UL (ref 0–0.8)
MONOCYTES # BLD AUTO: 1.42 K/UL (ref 0–0.8)
MONOCYTES NFR BLD AUTO: 3.8 % (ref 2–6)
MONOCYTES NFR BLD AUTO: 4.2 % (ref 2–6)
MONOCYTES NFR BLD AUTO: 4.4 % (ref 2–6)
MONOCYTES NFR BLD AUTO: 4.6 % (ref 2–6)
MONOCYTES NFR BLD AUTO: 5 % (ref 2–6)
MONOCYTES NFR BLD AUTO: 6.1 % (ref 2–6)
MONOCYTES NFR BLD AUTO: 6.2 % (ref 2–6)
MONOCYTES NFR BLD AUTO: 6.7 % (ref 2–6)
MONOCYTES NFR BLD AUTO: 8.4 % (ref 2–6)
MONOCYTES NFR BLD AUTO: 8.6 % (ref 2–6)
MONOCYTES NFR BLD AUTO: 9.1 % (ref 2–6)
MONOCYTES NFR BLD AUTO: 9.3 % (ref 2–6)
MONOCYTES NFR BLD AUTO: 9.6 % (ref 2–6)
MONOCYTES NFR BLD MANUAL: 1 % (ref 2–6)
MONOCYTES NFR BLD MANUAL: 1 % (ref 2–6)
MONOCYTES NFR BLD MANUAL: 2 % (ref 2–6)
MONOCYTES NFR BLD MANUAL: 3 % (ref 2–6)
MONOCYTES NFR BLD MANUAL: 4 % (ref 2–6)
MONOCYTES NFR BLD MANUAL: 4 % (ref 2–6)
MONOCYTES NFR BLD MANUAL: 6 % (ref 2–6)
MONOCYTES NFR BLD MANUAL: 7 % (ref 2–6)
MONOCYTES NFR BLD MANUAL: 9 % (ref 2–6)
MPC BLD CALC-MCNC: 10 FL (ref 9.4–12.4)
MPC BLD CALC-MCNC: 10 FL (ref 9.4–12.4)
MPC BLD CALC-MCNC: 10.1 FL (ref 9.4–12.4)
MPC BLD CALC-MCNC: 10.2 FL (ref 9.4–12.4)
MPC BLD CALC-MCNC: 10.3 FL (ref 9.4–12.4)
MPC BLD CALC-MCNC: 10.5 FL (ref 9.4–12.4)
MPC BLD CALC-MCNC: 10.5 FL (ref 9.4–12.4)
MPC BLD CALC-MCNC: 10.6 FL (ref 9.4–12.4)
MPC BLD CALC-MCNC: 10.6 FL (ref 9.4–12.4)
MPC BLD CALC-MCNC: 9.7 FL (ref 9.4–12.4)
MPC BLD CALC-MCNC: 9.9 FL (ref 9.4–12.4)
MV MEAN GRADIENT: 33 MMHG
MV PEAK E VEL: 2.04 M/S
MV PEAK GRADIENT: 51 MMHG
MV VALVE AREA BY CONTINUITY EQUATION: 0.18 CM2
NEUTROPHILS # BLD AUTO: 10.65 K/UL (ref 1.8–7.7)
NEUTROPHILS # BLD AUTO: 10.69 K/UL (ref 1.8–7.7)
NEUTROPHILS # BLD AUTO: 11.34 K/UL (ref 1.8–7.7)
NEUTROPHILS # BLD AUTO: 12.48 K/UL (ref 1.8–7.7)
NEUTROPHILS # BLD AUTO: 12.85 K/UL (ref 1.8–7.7)
NEUTROPHILS # BLD AUTO: 14.65 K/UL (ref 1.8–7.7)
NEUTROPHILS # BLD AUTO: 3.42 K/UL (ref 1.8–7.7)
NEUTROPHILS # BLD AUTO: 3.58 K/UL (ref 1.8–7.7)
NEUTROPHILS # BLD AUTO: 4.43 K/UL (ref 1.8–7.7)
NEUTROPHILS # BLD AUTO: 4.68 K/UL (ref 1.8–7.7)
NEUTROPHILS # BLD AUTO: 5.36 K/UL (ref 1.8–7.7)
NEUTROPHILS # BLD AUTO: 8.11 K/UL (ref 1.8–7.7)
NEUTROPHILS # BLD AUTO: 8.73 K/UL (ref 1.8–7.7)
NEUTROPHILS NFR BLD AUTO: 55.2 % (ref 53–65)
NEUTROPHILS NFR BLD AUTO: 61.9 % (ref 53–65)
NEUTROPHILS NFR BLD AUTO: 64.2 % (ref 53–65)
NEUTROPHILS NFR BLD AUTO: 68.2 % (ref 53–65)
NEUTROPHILS NFR BLD AUTO: 76 % (ref 53–65)
NEUTROPHILS NFR BLD AUTO: 83.3 % (ref 53–65)
NEUTROPHILS NFR BLD AUTO: 87.3 % (ref 53–65)
NEUTROPHILS NFR BLD AUTO: 87.6 % (ref 53–65)
NEUTROPHILS NFR BLD AUTO: 88.7 % (ref 53–65)
NEUTROPHILS NFR BLD AUTO: 91.5 % (ref 53–65)
NEUTROPHILS NFR BLD AUTO: 92 % (ref 53–65)
NEUTROPHILS NFR BLD AUTO: 92.5 % (ref 53–65)
NEUTROPHILS NFR BLD AUTO: 92.5 % (ref 53–65)
NEUTS BAND NFR BLD MANUAL: 1 % (ref 1–5)
NEUTS BAND NFR BLD MANUAL: 4 % (ref 1–5)
NEUTS BAND NFR BLD MANUAL: 5 % (ref 1–5)
NEUTS BAND NFR BLD MANUAL: 6 % (ref 1–5)
NEUTS BAND NFR BLD MANUAL: 9 % (ref 1–5)
NEUTS BAND NFR BLD MANUAL: 9 % (ref 1–5)
NEUTS SEG NFR BLD MANUAL: 57 % (ref 50–62)
NEUTS SEG NFR BLD MANUAL: 81 % (ref 50–62)
NEUTS SEG NFR BLD MANUAL: 82 % (ref 50–62)
NEUTS SEG NFR BLD MANUAL: 86 % (ref 50–62)
NEUTS SEG NFR BLD MANUAL: 88 % (ref 50–62)
NEUTS SEG NFR BLD MANUAL: 90 % (ref 50–62)
NEUTS SEG NFR BLD MANUAL: 91 % (ref 50–62)
NEUTS SEG NFR BLD MANUAL: 93 % (ref 50–62)
NEUTS SEG NFR BLD MANUAL: 95 % (ref 50–62)
NITRITE UR QL STRIP: NEGATIVE
NITRITE UR QL STRIP: NEGATIVE
NRBC # BLD AUTO: 0 X10E3/UL
NRBC # BLD AUTO: 0.02 X10E3/UL
NRBC BLD MANUAL-RTO: 1 /100 WBC
NRBC BLD MANUAL-RTO: 2 /100 WBC
NRBC, AUTO (.00): 0 %
NRBC, AUTO (.00): 0.1 %
NRBC, AUTO (.00): 0.1 %
NRBC, AUTO (.00): 0.2 %
NRBC, AUTO (.00): 0.2 %
NT-PROBNP SERPL-MCNC: 5136 PG/ML (ref 1–125)
NT-PROBNP SERPL-MCNC: ABNORMAL PG/ML (ref 1–125)
OPIATES UR QL SCN: NEGATIVE
OVALOCYTES BLD QL SMEAR: ABNORMAL
OVALOCYTES BLD QL SMEAR: ABNORMAL
PCO2 BLDA: 104 MMHG (ref 35–48)
PCO2 BLDA: 47 MMHG (ref 35–48)
PCO2 BLDA: 51 MMHG (ref 35–48)
PCO2 BLDA: 55 MMHG (ref 35–48)
PCP UR QL SCN: NEGATIVE
PH SMN: 7.08 [PH] (ref 7.35–7.45)
PH SMN: 7.32 [PH] (ref 7.35–7.45)
PH SMN: 7.32 [PH] (ref 7.35–7.45)
PH SMN: 7.35 [PH] (ref 7.35–7.45)
PH UR STRIP: 5.5 PH UNITS
PH UR STRIP: 6 PH UNITS
PHOSPHATE SERPL-MCNC: 3.2 MG/DL (ref 2.5–4.5)
PHOSPHATE SERPL-MCNC: 3.4 MG/DL (ref 2.5–4.5)
PHOSPHATE SERPL-MCNC: 3.5 MG/DL (ref 2.5–4.5)
PHOSPHATE SERPL-MCNC: 3.6 MG/DL (ref 2.5–4.5)
PHOSPHATE SERPL-MCNC: 3.7 MG/DL (ref 2.5–4.5)
PHOSPHATE SERPL-MCNC: 3.8 MG/DL (ref 2.5–4.5)
PHOSPHATE SERPL-MCNC: 3.9 MG/DL (ref 2.5–4.5)
PHOSPHATE SERPL-MCNC: 4.5 MG/DL (ref 2.5–4.5)
PHOSPHATE SERPL-MCNC: 4.7 MG/DL (ref 2.5–4.5)
PHOSPHATE SERPL-MCNC: 4.7 MG/DL (ref 2.5–4.5)
PHOSPHATE SERPL-MCNC: 4.9 MG/DL (ref 2.5–4.5)
PHOSPHATE SERPL-MCNC: 5.3 MG/DL (ref 2.5–4.5)
PHOSPHATE SERPL-MCNC: 5.4 MG/DL (ref 2.5–4.5)
PHOSPHATE SERPL-MCNC: 5.5 MG/DL (ref 2.5–4.5)
PHOSPHATE SERPL-MCNC: 5.5 MG/DL (ref 2.5–4.5)
PHOSPHATE SERPL-MCNC: 5.6 MG/DL (ref 2.5–4.5)
PHOSPHATE SERPL-MCNC: 5.9 MG/DL (ref 2.5–4.5)
PHOSPHATE SERPL-MCNC: 6.1 MG/DL (ref 2.5–4.5)
PHOSPHATE SERPL-MCNC: 6.4 MG/DL (ref 2.5–4.5)
PHOSPHATE SERPL-MCNC: 6.7 MG/DL (ref 2.5–4.5)
PHOSPHATE SERPL-MCNC: 6.9 MG/DL (ref 2.5–4.5)
PHOSPHATE SERPL-MCNC: 7.1 MG/DL (ref 2.5–4.5)
PHOSPHATE SERPL-MCNC: 7.1 MG/DL (ref 2.5–4.5)
PHOSPHATE SERPL-MCNC: 8.3 MG/DL (ref 2.5–4.5)
PISA TR MAX VEL: 2.6 M/S
PLATELET # BLD AUTO: 125 K/UL (ref 150–400)
PLATELET # BLD AUTO: 184 K/UL (ref 150–400)
PLATELET # BLD AUTO: 213 K/UL (ref 150–400)
PLATELET # BLD AUTO: 217 K/UL (ref 150–400)
PLATELET # BLD AUTO: 231 K/UL (ref 150–400)
PLATELET # BLD AUTO: 248 K/UL (ref 150–400)
PLATELET # BLD AUTO: 251 K/UL (ref 150–400)
PLATELET # BLD AUTO: 257 K/UL (ref 150–400)
PLATELET # BLD AUTO: 262 K/UL (ref 150–400)
PLATELET # BLD AUTO: 267 K/UL (ref 150–400)
PLATELET # BLD AUTO: 284 K/UL (ref 150–400)
PLATELET # BLD AUTO: 287 K/UL (ref 150–400)
PLATELET # BLD AUTO: 290 K/UL (ref 150–400)
PLATELET MORPHOLOGY: ABNORMAL
PLATELET MORPHOLOGY: NORMAL
PO2 BLDA: 117 MMHG (ref 83–108)
PO2 BLDA: 16 MMHG (ref 83–108)
PO2 BLDA: 164 MMHG (ref 83–108)
PO2 BLDA: 247 MMHG (ref 83–108)
POC BASE EXCESS: -0.4 MMOL/L (ref -2–3)
POC BASE EXCESS: -2.3 MMOL/L (ref -2–3)
POC BASE EXCESS: -2.4 MMOL/L (ref -2–3)
POC BASE EXCESS: 3.6 MMOL/L (ref -2–3)
POC CO2: 25.6 MMOL/L
POC CO2: 34 MMOL/L
POC IONIZED CALCIUM: 1.05 MMOL/L (ref 1.15–1.35)
POC IONIZED CALCIUM: 1.1 MMOL/L (ref 1.15–1.35)
POC SATURATED O2: 100 % (ref 95–98)
POC SATURATED O2: 11 % (ref 95–98)
POC SATURATED O2: 98 % (ref 95–98)
POC SATURATED O2: 99 % (ref 95–98)
POCT GLUCOSE: 138 MG/DL (ref 60–95)
POCT GLUCOSE: 180 MG/DL (ref 60–95)
POLYCHROMASIA BLD QL SMEAR: ABNORMAL
POLYCHROMASIA BLD QL SMEAR: ABNORMAL
POTASSIUM BLD-SCNC: 4.5 MMOL/L (ref 3.4–4.5)
POTASSIUM BLD-SCNC: 4.7 MMOL/L (ref 3.4–4.5)
POTASSIUM SERPL-SCNC: 3 MMOL/L (ref 3.5–5.1)
POTASSIUM SERPL-SCNC: 3.4 MMOL/L (ref 3.5–5.1)
POTASSIUM SERPL-SCNC: 3.7 MMOL/L (ref 3.5–5.1)
POTASSIUM SERPL-SCNC: 3.8 MMOL/L (ref 3.5–5.1)
POTASSIUM SERPL-SCNC: 3.9 MMOL/L (ref 3.5–5.1)
POTASSIUM SERPL-SCNC: 3.9 MMOL/L (ref 3.5–5.1)
POTASSIUM SERPL-SCNC: 4.2 MMOL/L (ref 3.5–5.1)
POTASSIUM SERPL-SCNC: 4.4 MMOL/L (ref 3.5–5.1)
POTASSIUM SERPL-SCNC: 4.4 MMOL/L (ref 3.5–5.1)
POTASSIUM SERPL-SCNC: 4.5 MMOL/L (ref 3.5–5.1)
POTASSIUM SERPL-SCNC: 5 MMOL/L (ref 3.5–5.1)
POTASSIUM SERPL-SCNC: 5.6 MMOL/L (ref 3.5–5.1)
POTASSIUM SERPL-SCNC: 5.6 MMOL/L (ref 3.5–5.1)
PROT SERPL-MCNC: 5.1 G/DL (ref 6.4–8.2)
PROT SERPL-MCNC: 5.3 G/DL (ref 6.4–8.2)
PROT SERPL-MCNC: 5.4 G/DL (ref 6.4–8.2)
PROT SERPL-MCNC: 6.1 G/DL (ref 6.4–8.2)
PROT SERPL-MCNC: 6.2 G/DL (ref 6.4–8.2)
PROT SERPL-MCNC: 6.2 G/DL (ref 6.4–8.2)
PROT SERPL-MCNC: 6.4 G/DL (ref 6.4–8.2)
PROT SERPL-MCNC: 6.4 G/DL (ref 6.4–8.2)
PROT SERPL-MCNC: 6.7 G/DL (ref 6.4–8.2)
PROT SERPL-MCNC: 7.1 G/DL (ref 6.4–8.2)
PROT SERPL-MCNC: 7.3 G/DL (ref 6.4–8.2)
PROT SERPL-MCNC: 7.8 G/DL (ref 6.4–8.2)
PROT UR QL STRIP: 100
PROT UR QL STRIP: NEGATIVE
PROTHROMBIN TIME: 14.5 SECONDS (ref 11.7–14.7)
PROTHROMBIN TIME: 15.4 SECONDS (ref 11.7–14.7)
PROTHROMBIN TIME: 17.5 SECONDS (ref 11.7–14.7)
RA MAJOR: 5.1 CM
RBC # BLD AUTO: 2.11 M/UL (ref 4.6–6.2)
RBC # BLD AUTO: 2.32 M/UL (ref 4.6–6.2)
RBC # BLD AUTO: 2.49 M/UL (ref 4.6–6.2)
RBC # BLD AUTO: 2.63 M/UL (ref 4.6–6.2)
RBC # BLD AUTO: 2.64 M/UL (ref 4.6–6.2)
RBC # BLD AUTO: 2.64 M/UL (ref 4.6–6.2)
RBC # BLD AUTO: 3.85 M/UL (ref 4.6–6.2)
RBC # BLD AUTO: 4.43 M/UL (ref 4.6–6.2)
RBC # BLD AUTO: 4.59 M/UL (ref 4.6–6.2)
RBC # BLD AUTO: 4.74 M/UL (ref 4.6–6.2)
RBC # BLD AUTO: 4.83 M/UL (ref 4.6–6.2)
RBC # BLD AUTO: 4.99 M/UL (ref 4.6–6.2)
RBC # BLD AUTO: 5.62 M/UL (ref 4.6–6.2)
RBC # UR STRIP: ABNORMAL /UL
RBC # UR STRIP: NEGATIVE /UL
RBC #/AREA URNS HPF: ABNORMAL /HPF
RBC MORPH BLD: NORMAL
RH BLD: NORMAL
RIGHT VENTRICULAR END-DIASTOLIC DIMENSION: 4.9 CM
SARS-COV-2 AG RESP QL IA.RAPID: NEGATIVE
SARS-COV-2 AG RESP QL IA.RAPID: NEGATIVE
SODIUM BLD-SCNC: 134 MMOL/L (ref 136–145)
SODIUM BLD-SCNC: 138 MMOL/L (ref 136–145)
SODIUM SERPL-SCNC: 132 MMOL/L (ref 136–145)
SODIUM SERPL-SCNC: 133 MMOL/L (ref 136–145)
SODIUM SERPL-SCNC: 135 MMOL/L (ref 136–145)
SODIUM SERPL-SCNC: 136 MMOL/L (ref 136–145)
SODIUM SERPL-SCNC: 136 MMOL/L (ref 136–145)
SODIUM SERPL-SCNC: 137 MMOL/L (ref 136–145)
SODIUM SERPL-SCNC: 138 MMOL/L (ref 136–145)
SODIUM SERPL-SCNC: 139 MMOL/L (ref 136–145)
SODIUM SERPL-SCNC: 140 MMOL/L (ref 136–145)
SODIUM SERPL-SCNC: 143 MMOL/L (ref 136–145)
SODIUM SERPL-SCNC: 143 MMOL/L (ref 136–145)
SP GR UR STRIP: 1.01
SP GR UR STRIP: >1.05
SQUAMOUS #/AREA URNS LPF: ABNORMAL /LPF
T3RU NFR SERPL: NORMAL %
T3RU NFR SERPL: NORMAL %
TARGETS BLD QL SMEAR: ABNORMAL
TR MAX PG: 27 MMHG
TRICUSPID ANNULAR PLANE SYSTOLIC EXCURSION: 1.8 CM
TROPONIN I SERPL HS-MCNC: 295.8 PG/ML
TROPONIN I SERPL HS-MCNC: 326.9 PG/ML
TROPONIN I SERPL HS-MCNC: 334.4 PG/ML
TROPONIN I SERPL HS-MCNC: 375.5 PG/ML
TSH SERPL DL<=0.005 MIU/L-ACNC: 0.11 UIU/ML (ref 0.36–3.74)
UNIT NUMBER: NORMAL
UNIT NUMBER: NORMAL
UROBILINOGEN UR STRIP-ACNC: 0.2 MG/DL
UROBILINOGEN UR STRIP-ACNC: NORMAL MG/DL
VANCOMYCIN SERPL-MCNC: 12.5 ΜG/ML (ref 0–20)
VANCOMYCIN SERPL-MCNC: 18.1 ΜG/ML (ref 0–20)
VANCOMYCIN SERPL-MCNC: 28.1 ΜG/ML (ref 0–20)
VANCOMYCIN TROUGH SERPL-MCNC: 27.5 ΜG/ML (ref 10–20)
WBC # BLD AUTO: 11.51 K/UL (ref 4.5–11)
WBC # BLD AUTO: 11.68 K/UL (ref 4.5–11)
WBC # BLD AUTO: 12.78 K/UL (ref 4.5–11)
WBC # BLD AUTO: 14.24 K/UL (ref 4.5–11)
WBC # BLD AUTO: 14.72 K/UL (ref 4.5–11)
WBC # BLD AUTO: 15.84 K/UL (ref 4.5–11)
WBC # BLD AUTO: 5.58 K/UL (ref 4.5–11)
WBC # BLD AUTO: 6.21 K/UL (ref 4.5–11)
WBC # BLD AUTO: 6.5 K/UL (ref 4.5–11)
WBC # BLD AUTO: 7.06 K/UL (ref 4.5–11)
WBC # BLD AUTO: 7.57 K/UL (ref 4.5–11)
WBC # BLD AUTO: 9.49 K/UL (ref 4.5–11)
WBC # BLD AUTO: 9.74 K/UL (ref 4.5–11)
WBC #/AREA URNS HPF: ABNORMAL /HPF
YEAST #/AREA URNS HPF: ABNORMAL /HPF

## 2022-01-01 PROCEDURE — 63600175 PHARM REV CODE 636 W HCPCS: Performed by: INTERNAL MEDICINE

## 2022-01-01 PROCEDURE — 70491 CT SOFT TISSUE NECK W/DYE: CPT | Mod: TC

## 2022-01-01 PROCEDURE — 25000003 PHARM REV CODE 250: Performed by: INTERNAL MEDICINE

## 2022-01-01 PROCEDURE — 84100 ASSAY OF PHOSPHORUS: CPT | Performed by: INTERNAL MEDICINE

## 2022-01-01 PROCEDURE — 27000221 HC OXYGEN, UP TO 24 HOURS

## 2022-01-01 PROCEDURE — 99900035 HC TECH TIME PER 15 MIN (STAT)

## 2022-01-01 PROCEDURE — 63600175 PHARM REV CODE 636 W HCPCS: Performed by: NURSE PRACTITIONER

## 2022-01-01 PROCEDURE — 72050 X-RAY EXAM NECK SPINE 4/5VWS: CPT | Mod: 26,,, | Performed by: ORTHOPAEDIC SURGERY

## 2022-01-01 PROCEDURE — 76705 ECHO EXAM OF ABDOMEN: CPT | Mod: 26,,, | Performed by: RADIOLOGY

## 2022-01-01 PROCEDURE — 99212 OFFICE O/P EST SF 10 MIN: CPT | Mod: ,,, | Performed by: NURSE PRACTITIONER

## 2022-01-01 PROCEDURE — 85651 RBC SED RATE NONAUTOMATED: CPT

## 2022-01-01 PROCEDURE — 85025 COMPLETE CBC W/AUTO DIFF WBC: CPT | Performed by: FAMILY MEDICINE

## 2022-01-01 PROCEDURE — 82947 ASSAY GLUCOSE BLOOD QUANT: CPT | Performed by: EMERGENCY MEDICINE

## 2022-01-01 PROCEDURE — 83036 HEMOGLOBIN GLYCOSYLATED A1C: CPT | Performed by: INTERNAL MEDICINE

## 2022-01-01 PROCEDURE — 99223 1ST HOSP IP/OBS HIGH 75: CPT | Mod: ,,, | Performed by: STUDENT IN AN ORGANIZED HEALTH CARE EDUCATION/TRAINING PROGRAM

## 2022-01-01 PROCEDURE — D9220A PRA ANESTHESIA: ICD-10-PCS | Mod: ANES,,, | Performed by: ANESTHESIOLOGY

## 2022-01-01 PROCEDURE — 73080 X-RAY EXAM OF ELBOW: CPT | Mod: TC,LT

## 2022-01-01 PROCEDURE — 37000009 HC ANESTHESIA EA ADD 15 MINS: Performed by: ORTHOPAEDIC SURGERY

## 2022-01-01 PROCEDURE — 95813 EEG EXTND MNTR 61-119 MIN: CPT

## 2022-01-01 PROCEDURE — 3077F SYST BP >= 140 MM HG: CPT | Mod: ,,, | Performed by: NURSE PRACTITIONER

## 2022-01-01 PROCEDURE — 83735 ASSAY OF MAGNESIUM: CPT | Performed by: INTERNAL MEDICINE

## 2022-01-01 PROCEDURE — 36415 COLL VENOUS BLD VENIPUNCTURE: CPT | Performed by: INTERNAL MEDICINE

## 2022-01-01 PROCEDURE — 99233 PR SUBSEQUENT HOSPITAL CARE,LEVL III: ICD-10-PCS | Mod: ,,, | Performed by: INTERNAL MEDICINE

## 2022-01-01 PROCEDURE — 85025 COMPLETE CBC W/AUTO DIFF WBC: CPT | Performed by: INTERNAL MEDICINE

## 2022-01-01 PROCEDURE — 70491 CT SOFT TISSUE NECK WITH CONTRAST: ICD-10-PCS | Mod: 26,,, | Performed by: RADIOLOGY

## 2022-01-01 PROCEDURE — 1160F PR REVIEW ALL MEDS BY PRESCRIBER/CLIN PHARMACIST DOCUMENTED: ICD-10-PCS | Mod: ,,, | Performed by: FAMILY MEDICINE

## 2022-01-01 PROCEDURE — 82947 ASSAY GLUCOSE BLOOD QUANT: CPT | Performed by: INTERNAL MEDICINE

## 2022-01-01 PROCEDURE — 93010 EKG 12-LEAD: ICD-10-PCS | Mod: ,,, | Performed by: STUDENT IN AN ORGANIZED HEALTH CARE EDUCATION/TRAINING PROGRAM

## 2022-01-01 PROCEDURE — 99204 PR OFFICE/OUTPT VISIT, NEW, LEVL IV, 45-59 MIN: ICD-10-PCS | Mod: S$PBB,,, | Performed by: SURGERY

## 2022-01-01 PROCEDURE — 99199 UNLISTED SPECIAL SVC PX/RPRT: CPT

## 2022-01-01 PROCEDURE — 1159F MED LIST DOCD IN RCRD: CPT | Mod: ,,, | Performed by: NURSE PRACTITIONER

## 2022-01-01 PROCEDURE — 88305 TISSUE EXAM BY PATHOLOGIST: CPT | Mod: SUR | Performed by: SURGERY

## 2022-01-01 PROCEDURE — 1125F AMNT PAIN NOTED PAIN PRSNT: CPT | Mod: CPTII,,, | Performed by: SURGERY

## 2022-01-01 PROCEDURE — 25000242 PHARM REV CODE 250 ALT 637 W/ HCPCS: Performed by: INTERNAL MEDICINE

## 2022-01-01 PROCEDURE — 88305 TISSUE EXAM BY PATHOLOGIST: CPT | Mod: 26,,, | Performed by: PATHOLOGY

## 2022-01-01 PROCEDURE — 92950 HEART/LUNG RESUSCITATION CPR: CPT

## 2022-01-01 PROCEDURE — P9047 ALBUMIN (HUMAN), 25%, 50ML: HCPCS | Performed by: INTERNAL MEDICINE

## 2022-01-01 PROCEDURE — 93005 ELECTROCARDIOGRAM TRACING: CPT

## 2022-01-01 PROCEDURE — 80053 COMPREHEN METABOLIC PANEL: CPT | Mod: ,,, | Performed by: CLINICAL MEDICAL LABORATORY

## 2022-01-01 PROCEDURE — 92950 PR HEART/LUNG RESUSCITATION (CPR): ICD-10-PCS | Mod: ,,, | Performed by: NURSE PRACTITIONER

## 2022-01-01 PROCEDURE — 82947 ASSAY GLUCOSE BLOOD QUANT: CPT

## 2022-01-01 PROCEDURE — 25000003 PHARM REV CODE 250: Performed by: NURSE ANESTHETIST, CERTIFIED REGISTERED

## 2022-01-01 PROCEDURE — 99204 OFFICE O/P NEW MOD 45 MIN: CPT | Mod: S$PBB,,, | Performed by: SURGERY

## 2022-01-01 PROCEDURE — 25000242 PHARM REV CODE 250 ALT 637 W/ HCPCS: Performed by: FAMILY MEDICINE

## 2022-01-01 PROCEDURE — 1159F MED LIST DOCD IN RCRD: CPT | Mod: CPTII,,, | Performed by: OTOLARYNGOLOGY

## 2022-01-01 PROCEDURE — 99233 SBSQ HOSP IP/OBS HIGH 50: CPT | Mod: ,,, | Performed by: INTERNAL MEDICINE

## 2022-01-01 PROCEDURE — 93010 ELECTROCARDIOGRAM REPORT: CPT | Mod: S$PBB,,, | Performed by: INTERNAL MEDICINE

## 2022-01-01 PROCEDURE — 76705 US ABDOMEN LIMITED: ICD-10-PCS | Mod: 26,,, | Performed by: RADIOLOGY

## 2022-01-01 PROCEDURE — 71000015 HC POSTOP RECOV 1ST HR: Performed by: ORTHOPAEDIC SURGERY

## 2022-01-01 PROCEDURE — 80202 ASSAY OF VANCOMYCIN: CPT | Performed by: INTERNAL MEDICINE

## 2022-01-01 PROCEDURE — 99204 OFFICE O/P NEW MOD 45 MIN: CPT | Mod: ,,, | Performed by: NURSE PRACTITIONER

## 2022-01-01 PROCEDURE — 85025 CBC WITH DIFFERENTIAL: ICD-10-PCS | Mod: ,,, | Performed by: CLINICAL MEDICAL LABORATORY

## 2022-01-01 PROCEDURE — 71000033 HC RECOVERY, INTIAL HOUR: Performed by: ORTHOPAEDIC SURGERY

## 2022-01-01 PROCEDURE — 1159F MED LIST DOCD IN RCRD: CPT | Mod: CPTII,,, | Performed by: SURGERY

## 2022-01-01 PROCEDURE — 87426 SARS CORONAVIRUS 2 ANTIGEN POCT: ICD-10-PCS | Mod: QW,,, | Performed by: NURSE PRACTITIONER

## 2022-01-01 PROCEDURE — 82150 AMYLASE: ICD-10-PCS | Mod: ,,, | Performed by: CLINICAL MEDICAL LABORATORY

## 2022-01-01 PROCEDURE — 63600150 PHARM REV CODE 636: Performed by: STUDENT IN AN ORGANIZED HEALTH CARE EDUCATION/TRAINING PROGRAM

## 2022-01-01 PROCEDURE — 94640 AIRWAY INHALATION TREATMENT: CPT

## 2022-01-01 PROCEDURE — 36000707: Performed by: ORTHOPAEDIC SURGERY

## 2022-01-01 PROCEDURE — 99051 PR MEDICAL SERVICES, EVE/WKEND/HOLIDAY: ICD-10-PCS | Mod: ,,, | Performed by: FAMILY MEDICINE

## 2022-01-01 PROCEDURE — 85025 COMPLETE CBC W/AUTO DIFF WBC: CPT | Performed by: NURSE PRACTITIONER

## 2022-01-01 PROCEDURE — 01710 ANES PX NRV MUSC UPR A&E NOS: CPT | Performed by: ORTHOPAEDIC SURGERY

## 2022-01-01 PROCEDURE — 85014 HEMATOCRIT: CPT

## 2022-01-01 PROCEDURE — 25500020 PHARM REV CODE 255: Performed by: FAMILY MEDICINE

## 2022-01-01 PROCEDURE — 86901 BLOOD TYPING SEROLOGIC RH(D): CPT | Performed by: INTERNAL MEDICINE

## 2022-01-01 PROCEDURE — 94003 VENT MGMT INPAT SUBQ DAY: CPT

## 2022-01-01 PROCEDURE — 99213 OFFICE O/P EST LOW 20 MIN: CPT | Mod: PBBFAC | Performed by: OTOLARYNGOLOGY

## 2022-01-01 PROCEDURE — 99214 OFFICE O/P EST MOD 30 MIN: CPT | Mod: 25,,, | Performed by: FAMILY MEDICINE

## 2022-01-01 PROCEDURE — 36591 DRAW BLOOD OFF VENOUS DEVICE: CPT | Performed by: REGISTERED NURSE

## 2022-01-01 PROCEDURE — 3080F DIAST BP >= 90 MM HG: CPT | Mod: ,,, | Performed by: FAMILY MEDICINE

## 2022-01-01 PROCEDURE — 63600175 PHARM REV CODE 636 W HCPCS: Performed by: STUDENT IN AN ORGANIZED HEALTH CARE EDUCATION/TRAINING PROGRAM

## 2022-01-01 PROCEDURE — 49505 PR REPAIR ING HERNIA,5+Y/O,REDUCIBL: ICD-10-PCS | Mod: LT,,, | Performed by: SURGERY

## 2022-01-01 PROCEDURE — 3008F PR BODY MASS INDEX (BMI) DOCUMENTED: ICD-10-PCS | Mod: CPTII,,, | Performed by: OTOLARYNGOLOGY

## 2022-01-01 PROCEDURE — 99212 OFFICE O/P EST SF 10 MIN: CPT | Mod: PBBFAC

## 2022-01-01 PROCEDURE — 25000003 PHARM REV CODE 250: Performed by: STUDENT IN AN ORGANIZED HEALTH CARE EDUCATION/TRAINING PROGRAM

## 2022-01-01 PROCEDURE — 10140 I&D HMTMA SEROMA/FLUID COLLJ: CPT

## 2022-01-01 PROCEDURE — 80053 COMPREHEN METABOLIC PANEL: CPT | Performed by: NURSE PRACTITIONER

## 2022-01-01 PROCEDURE — 3077F PR MOST RECENT SYSTOLIC BLOOD PRESSURE >= 140 MM HG: ICD-10-PCS | Mod: ,,, | Performed by: NURSE PRACTITIONER

## 2022-01-01 PROCEDURE — 25000003 PHARM REV CODE 250: Performed by: EMERGENCY MEDICINE

## 2022-01-01 PROCEDURE — 99291 CRITICAL CARE FIRST HOUR: CPT | Mod: ,,, | Performed by: NURSE PRACTITIONER

## 2022-01-01 PROCEDURE — 63600175 PHARM REV CODE 636 W HCPCS: Performed by: ANESTHESIOLOGY

## 2022-01-01 PROCEDURE — 85730 THROMBOPLASTIN TIME PARTIAL: CPT | Performed by: INTERNAL MEDICINE

## 2022-01-01 PROCEDURE — 96365 THER/PROPH/DIAG IV INF INIT: CPT | Mod: 59

## 2022-01-01 PROCEDURE — 36415 COLL VENOUS BLD VENIPUNCTURE: CPT | Performed by: FAMILY MEDICINE

## 2022-01-01 PROCEDURE — 94761 N-INVAS EAR/PLS OXIMETRY MLT: CPT

## 2022-01-01 PROCEDURE — 99214 OFFICE O/P EST MOD 30 MIN: CPT | Mod: S$PBB,,, | Performed by: OTOLARYNGOLOGY

## 2022-01-01 PROCEDURE — 93010 ELECTROCARDIOGRAM REPORT: CPT | Mod: 77,,, | Performed by: STUDENT IN AN ORGANIZED HEALTH CARE EDUCATION/TRAINING PROGRAM

## 2022-01-01 PROCEDURE — 3077F SYST BP >= 140 MM HG: CPT | Mod: ,,, | Performed by: FAMILY MEDICINE

## 2022-01-01 PROCEDURE — 99285 EMERGENCY DEPT VISIT HI MDM: CPT | Mod: 25

## 2022-01-01 PROCEDURE — 99900026 HC AIRWAY MAINTENANCE (STAT)

## 2022-01-01 PROCEDURE — 80053 COMPREHENSIVE METABOLIC PANEL: ICD-10-PCS | Mod: ,,, | Performed by: CLINICAL MEDICAL LABORATORY

## 2022-01-01 PROCEDURE — 76705 ECHO EXAM OF ABDOMEN: CPT | Mod: TC

## 2022-01-01 PROCEDURE — 93010 EKG 12-LEAD: ICD-10-PCS | Mod: 77,,, | Performed by: STUDENT IN AN ORGANIZED HEALTH CARE EDUCATION/TRAINING PROGRAM

## 2022-01-01 PROCEDURE — 25500020 PHARM REV CODE 255: Performed by: NURSE PRACTITIONER

## 2022-01-01 PROCEDURE — 87040 BLOOD CULTURE FOR BACTERIA: CPT | Performed by: FAMILY MEDICINE

## 2022-01-01 PROCEDURE — 80053 COMPREHEN METABOLIC PANEL: CPT | Performed by: INTERNAL MEDICINE

## 2022-01-01 PROCEDURE — 36591 DRAW BLOOD OFF VENOUS DEVICE: CPT

## 2022-01-01 PROCEDURE — 80053 COMPREHEN METABOLIC PANEL: CPT | Performed by: FAMILY MEDICINE

## 2022-01-01 PROCEDURE — 99283 EMERGENCY DEPT VISIT LOW MDM: CPT | Mod: ,,, | Performed by: EMERGENCY MEDICINE

## 2022-01-01 PROCEDURE — 71000016 HC POSTOP RECOV ADDL HR: Performed by: ORTHOPAEDIC SURGERY

## 2022-01-01 PROCEDURE — 99291 PR CRITICAL CARE, E/M 30-74 MINUTES: ICD-10-PCS | Mod: 25,,, | Performed by: NURSE PRACTITIONER

## 2022-01-01 PROCEDURE — 80048 BASIC METABOLIC PNL TOTAL CA: CPT | Performed by: INTERNAL MEDICINE

## 2022-01-01 PROCEDURE — 63600175 PHARM REV CODE 636 W HCPCS: Performed by: FAMILY MEDICINE

## 2022-01-01 PROCEDURE — 36430 TRANSFUSION BLD/BLD COMPNT: CPT

## 2022-01-01 PROCEDURE — 25000003 PHARM REV CODE 250: Performed by: FAMILY MEDICINE

## 2022-01-01 PROCEDURE — 36591 DRAW BLOOD OFF VENOUS DEVICE: CPT | Performed by: INTERNAL MEDICINE

## 2022-01-01 PROCEDURE — 25500020 PHARM REV CODE 255: Performed by: INTERNAL MEDICINE

## 2022-01-01 PROCEDURE — 36415 COLL VENOUS BLD VENIPUNCTURE: CPT | Performed by: EMERGENCY MEDICINE

## 2022-01-01 PROCEDURE — 20000000 HC ICU ROOM

## 2022-01-01 PROCEDURE — 85025 COMPLETE CBC W/AUTO DIFF WBC: CPT | Mod: ,,, | Performed by: CLINICAL MEDICAL LABORATORY

## 2022-01-01 PROCEDURE — 96374 THER/PROPH/DIAG INJ IV PUSH: CPT

## 2022-01-01 PROCEDURE — 36556 INSERT NON-TUNNEL CV CATH: CPT

## 2022-01-01 PROCEDURE — 99204 PR OFFICE/OUTPT VISIT, NEW, LEVL IV, 45-59 MIN: ICD-10-PCS | Mod: ,,, | Performed by: NURSE PRACTITIONER

## 2022-01-01 PROCEDURE — 3008F PR BODY MASS INDEX (BMI) DOCUMENTED: ICD-10-PCS | Mod: ,,, | Performed by: FAMILY MEDICINE

## 2022-01-01 PROCEDURE — 83735 ASSAY OF MAGNESIUM: CPT | Performed by: FAMILY MEDICINE

## 2022-01-01 PROCEDURE — 3079F PR MOST RECENT DIASTOLIC BLOOD PRESSURE 80-89 MM HG: ICD-10-PCS | Mod: ,,, | Performed by: NURSE PRACTITIONER

## 2022-01-01 PROCEDURE — 99233 PR SUBSEQUENT HOSPITAL CARE,LEVL III: ICD-10-PCS | Mod: ,,, | Performed by: NURSE PRACTITIONER

## 2022-01-01 PROCEDURE — 1125F PR PAIN SEVERITY QUANTIFIED, PAIN PRESENT: ICD-10-PCS | Mod: ,,, | Performed by: NURSE PRACTITIONER

## 2022-01-01 PROCEDURE — 3079F DIAST BP 80-89 MM HG: CPT | Mod: ,,, | Performed by: NURSE PRACTITIONER

## 2022-01-01 PROCEDURE — 25000003 PHARM REV CODE 250

## 2022-01-01 PROCEDURE — 1125F AMNT PAIN NOTED PAIN PRSNT: CPT | Mod: ,,, | Performed by: NURSE PRACTITIONER

## 2022-01-01 PROCEDURE — 1160F PR REVIEW ALL MEDS BY PRESCRIBER/CLIN PHARMACIST DOCUMENTED: ICD-10-PCS | Mod: CPTII,,, | Performed by: OTOLARYNGOLOGY

## 2022-01-01 PROCEDURE — 83880 ASSAY OF NATRIURETIC PEPTIDE: CPT | Performed by: NURSE PRACTITIONER

## 2022-01-01 PROCEDURE — 74178 CT ABDOMEN PELVIS W WO CONTRAST: ICD-10-PCS | Mod: 26,,, | Performed by: STUDENT IN AN ORGANIZED HEALTH CARE EDUCATION/TRAINING PROGRAM

## 2022-01-01 PROCEDURE — 99214 PR OFFICE/OUTPT VISIT, EST, LEVL IV, 30-39 MIN: ICD-10-PCS | Mod: 25,,, | Performed by: FAMILY MEDICINE

## 2022-01-01 PROCEDURE — 84484 ASSAY OF TROPONIN QUANT: CPT | Performed by: INTERNAL MEDICINE

## 2022-01-01 PROCEDURE — 80076 HEPATIC FUNCTION PANEL: CPT | Performed by: STUDENT IN AN ORGANIZED HEALTH CARE EDUCATION/TRAINING PROGRAM

## 2022-01-01 PROCEDURE — 94002 VENT MGMT INPAT INIT DAY: CPT

## 2022-01-01 PROCEDURE — 74178 CT ABD&PLV WO CNTR FLWD CNTR: CPT | Mod: TC

## 2022-01-01 PROCEDURE — 25000003 PHARM REV CODE 250: Performed by: SURGERY

## 2022-01-01 PROCEDURE — 36415 COLL VENOUS BLD VENIPUNCTURE: CPT | Performed by: NURSE PRACTITIONER

## 2022-01-01 PROCEDURE — 74178 CT ABD&PLV WO CNTR FLWD CNTR: CPT | Mod: 26,,, | Performed by: STUDENT IN AN ORGANIZED HEALTH CARE EDUCATION/TRAINING PROGRAM

## 2022-01-01 PROCEDURE — 99051 MED SERV EVE/WKEND/HOLIDAY: CPT | Mod: ,,, | Performed by: FAMILY MEDICINE

## 2022-01-01 PROCEDURE — 99213 PR OFFICE/OUTPT VISIT, EST, LEVL III, 20-29 MIN: ICD-10-PCS | Mod: ,,, | Performed by: NURSE PRACTITIONER

## 2022-01-01 PROCEDURE — 99213 OFFICE O/P EST LOW 20 MIN: CPT | Mod: ,,, | Performed by: NURSE PRACTITIONER

## 2022-01-01 PROCEDURE — C1751 CATH, INF, PER/CENT/MIDLINE: HCPCS | Performed by: STUDENT IN AN ORGANIZED HEALTH CARE EDUCATION/TRAINING PROGRAM

## 2022-01-01 PROCEDURE — 82803 BLOOD GASES ANY COMBINATION: CPT

## 2022-01-01 PROCEDURE — D9220A PRA ANESTHESIA: ICD-10-PCS | Mod: CRNA,,, | Performed by: NURSE ANESTHETIST, CERTIFIED REGISTERED

## 2022-01-01 PROCEDURE — 99283 PR EMERGENCY DEPT VISIT,LEVEL III: ICD-10-PCS | Mod: ,,, | Performed by: EMERGENCY MEDICINE

## 2022-01-01 PROCEDURE — 1160F RVW MEDS BY RX/DR IN RCRD: CPT | Mod: CPTII,,, | Performed by: OTOLARYNGOLOGY

## 2022-01-01 PROCEDURE — 3077F PR MOST RECENT SYSTOLIC BLOOD PRESSURE >= 140 MM HG: ICD-10-PCS | Mod: ,,, | Performed by: FAMILY MEDICINE

## 2022-01-01 PROCEDURE — 99291 CRITICAL CARE FIRST HOUR: CPT | Mod: 25,,, | Performed by: NURSE PRACTITIONER

## 2022-01-01 PROCEDURE — 3008F BODY MASS INDEX DOCD: CPT | Mod: CPTII,,, | Performed by: OTOLARYNGOLOGY

## 2022-01-01 PROCEDURE — 25000003 PHARM REV CODE 250: Performed by: ORTHOPAEDIC SURGERY

## 2022-01-01 PROCEDURE — D9220A PRA ANESTHESIA: Mod: ANES,,, | Performed by: ANESTHESIOLOGY

## 2022-01-01 PROCEDURE — 80048 BASIC METABOLIC PNL TOTAL CA: CPT | Performed by: FAMILY MEDICINE

## 2022-01-01 PROCEDURE — 99291 CRITICAL CARE FIRST HOUR: CPT | Mod: ,,, | Performed by: INTERNAL MEDICINE

## 2022-01-01 PROCEDURE — 85730 THROMBOPLASTIN TIME PARTIAL: CPT | Performed by: NURSE PRACTITIONER

## 2022-01-01 PROCEDURE — 3008F BODY MASS INDEX DOCD: CPT | Mod: ,,, | Performed by: FAMILY MEDICINE

## 2022-01-01 PROCEDURE — 27200966 HC CLOSED SUCTION SYSTEM

## 2022-01-01 PROCEDURE — 80074 ACUTE HEPATITIS PANEL: CPT

## 2022-01-01 PROCEDURE — 96372 THER/PROPH/DIAG INJ SC/IM: CPT | Mod: ,,, | Performed by: FAMILY MEDICINE

## 2022-01-01 PROCEDURE — 36592 COLLECT BLOOD FROM PICC: CPT | Performed by: INTERNAL MEDICINE

## 2022-01-01 PROCEDURE — 84443 ASSAY THYROID STIM HORMONE: CPT | Performed by: REGISTERED NURSE

## 2022-01-01 PROCEDURE — 1159F PR MEDICATION LIST DOCUMENTED IN MEDICAL RECORD: ICD-10-PCS | Mod: CPTII,,, | Performed by: OTOLARYNGOLOGY

## 2022-01-01 PROCEDURE — 99233 SBSQ HOSP IP/OBS HIGH 50: CPT | Mod: ,,, | Performed by: NURSE PRACTITIONER

## 2022-01-01 PROCEDURE — 3075F PR MOST RECENT SYSTOLIC BLOOD PRESS GE 130-139MM HG: ICD-10-PCS | Mod: ,,, | Performed by: NURSE PRACTITIONER

## 2022-01-01 PROCEDURE — 99223 1ST HOSP IP/OBS HIGH 75: CPT | Mod: ,,, | Performed by: INTERNAL MEDICINE

## 2022-01-01 PROCEDURE — 99211 OFF/OP EST MAY X REQ PHY/QHP: CPT | Mod: PBBFAC | Performed by: ORTHOPAEDIC SURGERY

## 2022-01-01 PROCEDURE — 99223 PR INITIAL HOSPITAL CARE,LEVL III: ICD-10-PCS | Mod: ,,, | Performed by: INTERNAL MEDICINE

## 2022-01-01 PROCEDURE — 93010 ELECTROCARDIOGRAM REPORT: CPT | Mod: ,,, | Performed by: INTERNAL MEDICINE

## 2022-01-01 PROCEDURE — 92950 HEART/LUNG RESUSCITATION CPR: CPT | Mod: ,,, | Performed by: NURSE PRACTITIONER

## 2022-01-01 PROCEDURE — 86140 C-REACTIVE PROTEIN: CPT

## 2022-01-01 PROCEDURE — 25000003 PHARM REV CODE 250: Performed by: NURSE PRACTITIONER

## 2022-01-01 PROCEDURE — 3080F PR MOST RECENT DIASTOLIC BLOOD PRESSURE >= 90 MM HG: ICD-10-PCS | Mod: ,,, | Performed by: FAMILY MEDICINE

## 2022-01-01 PROCEDURE — 99204 PR OFFICE/OUTPT VISIT, NEW, LEVL IV, 45-59 MIN: ICD-10-PCS | Mod: 25,S$PBB,, | Performed by: ORTHOPAEDIC SURGERY

## 2022-01-01 PROCEDURE — 99291 PR CRITICAL CARE, E/M 30-74 MINUTES: ICD-10-PCS | Mod: ,,, | Performed by: NURSE PRACTITIONER

## 2022-01-01 PROCEDURE — 85610 PROTHROMBIN TIME: CPT | Performed by: INTERNAL MEDICINE

## 2022-01-01 PROCEDURE — 83605 ASSAY OF LACTIC ACID: CPT | Performed by: NURSE PRACTITIONER

## 2022-01-01 PROCEDURE — 1159F PR MEDICATION LIST DOCUMENTED IN MEDICAL RECORD: ICD-10-PCS | Mod: ,,, | Performed by: NURSE PRACTITIONER

## 2022-01-01 PROCEDURE — 36600 WITHDRAWAL OF ARTERIAL BLOOD: CPT

## 2022-01-01 PROCEDURE — 81003 URINALYSIS AUTO W/O SCOPE: CPT | Performed by: FAMILY MEDICINE

## 2022-01-01 PROCEDURE — 85610 PROTHROMBIN TIME: CPT | Performed by: EMERGENCY MEDICINE

## 2022-01-01 PROCEDURE — 3075F SYST BP GE 130 - 139MM HG: CPT | Mod: ,,, | Performed by: NURSE PRACTITIONER

## 2022-01-01 PROCEDURE — 82962 GLUCOSE BLOOD TEST: CPT

## 2022-01-01 PROCEDURE — 83880 ASSAY OF NATRIURETIC PEPTIDE: CPT | Performed by: INTERNAL MEDICINE

## 2022-01-01 PROCEDURE — 36591 DRAW BLOOD OFF VENOUS DEVICE: CPT | Performed by: STUDENT IN AN ORGANIZED HEALTH CARE EDUCATION/TRAINING PROGRAM

## 2022-01-01 PROCEDURE — 1159F MED LIST DOCD IN RCRD: CPT | Mod: ,,, | Performed by: FAMILY MEDICINE

## 2022-01-01 PROCEDURE — C1894 INTRO/SHEATH, NON-LASER: HCPCS | Performed by: STUDENT IN AN ORGANIZED HEALTH CARE EDUCATION/TRAINING PROGRAM

## 2022-01-01 PROCEDURE — 99406 BEHAV CHNG SMOKING 3-10 MIN: CPT | Mod: S$PBB,,, | Performed by: ORTHOPAEDIC SURGERY

## 2022-01-01 PROCEDURE — 99204 OFFICE O/P NEW MOD 45 MIN: CPT | Mod: S$PBB,,, | Performed by: OTOLARYNGOLOGY

## 2022-01-01 PROCEDURE — 36415 COLL VENOUS BLD VENIPUNCTURE: CPT

## 2022-01-01 PROCEDURE — 36591 DRAW BLOOD OFF VENOUS DEVICE: CPT | Performed by: NURSE PRACTITIONER

## 2022-01-01 PROCEDURE — 85025 COMPLETE CBC W/AUTO DIFF WBC: CPT | Performed by: EMERGENCY MEDICINE

## 2022-01-01 PROCEDURE — 84295 ASSAY OF SERUM SODIUM: CPT

## 2022-01-01 PROCEDURE — 99214 PR OFFICE/OUTPT VISIT, EST, LEVL IV, 30-39 MIN: ICD-10-PCS | Mod: S$PBB,,, | Performed by: OTOLARYNGOLOGY

## 2022-01-01 PROCEDURE — 85730 THROMBOPLASTIN TIME PARTIAL: CPT | Performed by: STUDENT IN AN ORGANIZED HEALTH CARE EDUCATION/TRAINING PROGRAM

## 2022-01-01 PROCEDURE — 80307 DRUG TEST PRSMV CHEM ANLYZR: CPT | Performed by: INTERNAL MEDICINE

## 2022-01-01 PROCEDURE — 96375 TX/PRO/DX INJ NEW DRUG ADDON: CPT

## 2022-01-01 PROCEDURE — 93005 ELECTROCARDIOGRAM TRACING: CPT | Mod: PBBFAC | Performed by: INTERNAL MEDICINE

## 2022-01-01 PROCEDURE — 83605 ASSAY OF LACTIC ACID: CPT | Performed by: STUDENT IN AN ORGANIZED HEALTH CARE EDUCATION/TRAINING PROGRAM

## 2022-01-01 PROCEDURE — 99213 PR OFFICE/OUTPT VISIT, EST, LEVL III, 20-29 MIN: ICD-10-PCS | Mod: ,,, | Performed by: FAMILY MEDICINE

## 2022-01-01 PROCEDURE — 93010 ELECTROCARDIOGRAM REPORT: CPT | Mod: ,,, | Performed by: STUDENT IN AN ORGANIZED HEALTH CARE EDUCATION/TRAINING PROGRAM

## 2022-01-01 PROCEDURE — 87040 BLOOD CULTURE FOR BACTERIA: CPT | Performed by: INTERNAL MEDICINE

## 2022-01-01 PROCEDURE — 1159F PR MEDICATION LIST DOCUMENTED IN MEDICAL RECORD: ICD-10-PCS | Mod: CPTII,,, | Performed by: SURGERY

## 2022-01-01 PROCEDURE — 93010 EKG 12-LEAD: ICD-10-PCS | Mod: S$PBB,,, | Performed by: INTERNAL MEDICINE

## 2022-01-01 PROCEDURE — 81001 URINALYSIS AUTO W/SCOPE: CPT | Performed by: INTERNAL MEDICINE

## 2022-01-01 PROCEDURE — 3008F PR BODY MASS INDEX (BMI) DOCUMENTED: ICD-10-PCS | Mod: ,,, | Performed by: NURSE PRACTITIONER

## 2022-01-01 PROCEDURE — 99213 OFFICE O/P EST LOW 20 MIN: CPT | Mod: ,,, | Performed by: FAMILY MEDICINE

## 2022-01-01 PROCEDURE — D9220A PRA ANESTHESIA: Mod: CRNA,,, | Performed by: NURSE ANESTHETIST, CERTIFIED REGISTERED

## 2022-01-01 PROCEDURE — 1125F PR PAIN SEVERITY QUANTIFIED, PAIN PRESENT: ICD-10-PCS | Mod: CPTII,,, | Performed by: SURGERY

## 2022-01-01 PROCEDURE — P9016 RBC LEUKOCYTES REDUCED: HCPCS | Performed by: INTERNAL MEDICINE

## 2022-01-01 PROCEDURE — 99285 PR EMERGENCY DEPT VISIT,LEVEL V: ICD-10-PCS | Mod: ,,, | Performed by: EMERGENCY MEDICINE

## 2022-01-01 PROCEDURE — 99204 PR OFFICE/OUTPT VISIT, NEW, LEVL IV, 45-59 MIN: ICD-10-PCS | Mod: S$PBB,,, | Performed by: OTOLARYNGOLOGY

## 2022-01-01 PROCEDURE — 1160F RVW MEDS BY RX/DR IN RCRD: CPT | Mod: ,,, | Performed by: FAMILY MEDICINE

## 2022-01-01 PROCEDURE — 63600175 PHARM REV CODE 636 W HCPCS: Performed by: NURSE ANESTHETIST, CERTIFIED REGISTERED

## 2022-01-01 PROCEDURE — 87186 SC STD MICRODIL/AGAR DIL: CPT | Performed by: INTERNAL MEDICINE

## 2022-01-01 PROCEDURE — 85379 FIBRIN DEGRADATION QUANT: CPT | Performed by: EMERGENCY MEDICINE

## 2022-01-01 PROCEDURE — 84484 ASSAY OF TROPONIN QUANT: CPT | Performed by: EMERGENCY MEDICINE

## 2022-01-01 PROCEDURE — 96360 HYDRATION IV INFUSION INIT: CPT

## 2022-01-01 PROCEDURE — 70491 CT SOFT TISSUE NECK W/DYE: CPT | Mod: 26,,, | Performed by: RADIOLOGY

## 2022-01-01 PROCEDURE — 87428 POCT SARS-COV2 (COVID) WITH FLU ANTIGEN: ICD-10-PCS | Mod: QW,,, | Performed by: FAMILY MEDICINE

## 2022-01-01 PROCEDURE — 99284 PR EMERGENCY DEPT VISIT,LEVEL IV: ICD-10-PCS | Mod: ,,, | Performed by: FAMILY MEDICINE

## 2022-01-01 PROCEDURE — 82150 ASSAY OF AMYLASE: CPT | Mod: ,,, | Performed by: CLINICAL MEDICAL LABORATORY

## 2022-01-01 PROCEDURE — 99285 EMERGENCY DEPT VISIT HI MDM: CPT | Mod: ,,, | Performed by: EMERGENCY MEDICINE

## 2022-01-01 PROCEDURE — 86923 COMPATIBILITY TEST ELECTRIC: CPT | Mod: 91 | Performed by: INTERNAL MEDICINE

## 2022-01-01 PROCEDURE — 36620 INSERTION CATHETER ARTERY: CPT | Mod: ,,, | Performed by: NURSE PRACTITIONER

## 2022-01-01 PROCEDURE — 96372 PR INJECTION,THERAP/PROPH/DIAG2ST, IM OR SUBCUT: ICD-10-PCS | Mod: ,,, | Performed by: FAMILY MEDICINE

## 2022-01-01 PROCEDURE — C1751 CATH, INF, PER/CENT/MIDLINE: HCPCS

## 2022-01-01 PROCEDURE — 36620 ARTERIAL LINE: ICD-10-PCS | Mod: ,,, | Performed by: NURSE PRACTITIONER

## 2022-01-01 PROCEDURE — 88305 SURGICAL PATHOLOGY: ICD-10-PCS | Mod: 26,,, | Performed by: PATHOLOGY

## 2022-01-01 PROCEDURE — 99284 EMERGENCY DEPT VISIT MOD MDM: CPT | Mod: ,,, | Performed by: FAMILY MEDICINE

## 2022-01-01 PROCEDURE — 49505 PRP I/HERN INIT REDUC >5 YR: CPT | Mod: LT,,, | Performed by: SURGERY

## 2022-01-01 PROCEDURE — 82550 ASSAY OF CK (CPK): CPT | Performed by: EMERGENCY MEDICINE

## 2022-01-01 PROCEDURE — 99406 PR TOBACCO USE CESSATION INTERMEDIATE 3-10 MINUTES: ICD-10-PCS | Mod: S$PBB,,, | Performed by: ORTHOPAEDIC SURGERY

## 2022-01-01 PROCEDURE — 83690 ASSAY OF LIPASE: CPT | Mod: ,,, | Performed by: CLINICAL MEDICAL LABORATORY

## 2022-01-01 PROCEDURE — 93010 EKG 12-LEAD: ICD-10-PCS | Mod: ,,, | Performed by: INTERNAL MEDICINE

## 2022-01-01 PROCEDURE — 37000008 HC ANESTHESIA 1ST 15 MINUTES: Performed by: ORTHOPAEDIC SURGERY

## 2022-01-01 PROCEDURE — 36000706: Performed by: ORTHOPAEDIC SURGERY

## 2022-01-01 PROCEDURE — 87428 SARSCOV & INF VIR A&B AG IA: CPT | Mod: QW,,, | Performed by: FAMILY MEDICINE

## 2022-01-01 PROCEDURE — 99291 PR CRITICAL CARE, E/M 30-74 MINUTES: ICD-10-PCS | Mod: ,,, | Performed by: INTERNAL MEDICINE

## 2022-01-01 PROCEDURE — 87070 CULTURE OTHR SPECIMN AEROBIC: CPT | Performed by: INTERNAL MEDICINE

## 2022-01-01 PROCEDURE — 99204 OFFICE O/P NEW MOD 45 MIN: CPT | Mod: 25,S$PBB,, | Performed by: ORTHOPAEDIC SURGERY

## 2022-01-01 PROCEDURE — 3008F BODY MASS INDEX DOCD: CPT | Mod: ,,, | Performed by: NURSE PRACTITIONER

## 2022-01-01 PROCEDURE — 72050 XR CERVICAL SPINE AP LAT WITH FLEX EXTEN: ICD-10-PCS | Mod: 26,,, | Performed by: ORTHOPAEDIC SURGERY

## 2022-01-01 PROCEDURE — 81003 URINALYSIS AUTO W/O SCOPE: CPT | Performed by: INTERNAL MEDICINE

## 2022-01-01 PROCEDURE — 83605 ASSAY OF LACTIC ACID: CPT | Performed by: EMERGENCY MEDICINE

## 2022-01-01 PROCEDURE — 1159F PR MEDICATION LIST DOCUMENTED IN MEDICAL RECORD: ICD-10-PCS | Mod: ,,, | Performed by: FAMILY MEDICINE

## 2022-01-01 PROCEDURE — 83605 ASSAY OF LACTIC ACID: CPT | Performed by: INTERNAL MEDICINE

## 2022-01-01 PROCEDURE — 84132 ASSAY OF SERUM POTASSIUM: CPT

## 2022-01-01 PROCEDURE — 87426 SARSCOV CORONAVIRUS AG IA: CPT | Mod: QW,,, | Performed by: NURSE PRACTITIONER

## 2022-01-01 PROCEDURE — 80053 COMPREHEN METABOLIC PANEL: CPT | Performed by: EMERGENCY MEDICINE

## 2022-01-01 PROCEDURE — 83690 LIPASE: ICD-10-PCS | Mod: ,,, | Performed by: CLINICAL MEDICAL LABORATORY

## 2022-01-01 PROCEDURE — 99212 PR OFFICE/OUTPT VISIT, EST, LEVL II, 10-19 MIN: ICD-10-PCS | Mod: ,,, | Performed by: NURSE PRACTITIONER

## 2022-01-01 PROCEDURE — 83735 ASSAY OF MAGNESIUM: CPT | Performed by: EMERGENCY MEDICINE

## 2022-01-01 PROCEDURE — 99223 PR INITIAL HOSPITAL CARE,LEVL III: ICD-10-PCS | Mod: ,,, | Performed by: STUDENT IN AN ORGANIZED HEALTH CARE EDUCATION/TRAINING PROGRAM

## 2022-01-01 PROCEDURE — 82330 ASSAY OF CALCIUM: CPT

## 2022-01-01 PROCEDURE — 83605 ASSAY OF LACTIC ACID: CPT

## 2022-01-01 PROCEDURE — 85730 THROMBOPLASTIN TIME PARTIAL: CPT | Performed by: EMERGENCY MEDICINE

## 2022-01-01 PROCEDURE — 72050 X-RAY EXAM NECK SPINE 4/5VWS: CPT | Mod: TC

## 2022-01-01 PROCEDURE — 99214 OFFICE O/P EST MOD 30 MIN: CPT | Mod: PBBFAC | Performed by: SURGERY

## 2022-01-01 RX ORDER — HYDROCODONE BITARTRATE AND ACETAMINOPHEN 5; 325 MG/1; MG/1
1 TABLET ORAL EVERY 6 HOURS PRN
Qty: 28 TABLET | Refills: 0 | Status: SHIPPED | OUTPATIENT
Start: 2022-01-01 | End: 2022-01-01

## 2022-01-01 RX ORDER — SODIUM CHLORIDE 9 MG/ML
INJECTION, SOLUTION INTRAVENOUS CONTINUOUS
Status: DISPENSED | OUTPATIENT
Start: 2022-01-01 | End: 2022-01-01

## 2022-01-01 RX ORDER — METOLAZONE 5 MG/1
5 TABLET ORAL 2 TIMES DAILY
Status: DISCONTINUED | OUTPATIENT
Start: 2022-01-01 | End: 2022-01-01

## 2022-01-01 RX ORDER — DILTIAZEM HYDROCHLORIDE 120 MG/1
120 CAPSULE, COATED, EXTENDED RELEASE ORAL DAILY
Status: DISCONTINUED | OUTPATIENT
Start: 2022-01-01 | End: 2022-01-01

## 2022-01-01 RX ORDER — MORPHINE SULFATE 4 MG/ML
4 INJECTION, SOLUTION INTRAMUSCULAR; INTRAVENOUS
Status: DISCONTINUED | OUTPATIENT
Start: 2022-01-01 | End: 2022-01-01 | Stop reason: HOSPADM

## 2022-01-01 RX ORDER — PHENYLEPHRINE HYDROCHLORIDE 10 MG/ML
INJECTION INTRAVENOUS
Status: DISCONTINUED | OUTPATIENT
Start: 2022-01-01 | End: 2022-01-01

## 2022-01-01 RX ORDER — PREDNISONE 20 MG/1
40 TABLET ORAL DAILY
Qty: 10 TABLET | Refills: 0 | Status: SHIPPED | OUTPATIENT
Start: 2022-01-01 | End: 2022-01-01

## 2022-01-01 RX ORDER — EPHEDRINE SULFATE 50 MG/ML
INJECTION, SOLUTION INTRAVENOUS
Status: DISCONTINUED | OUTPATIENT
Start: 2022-01-01 | End: 2022-01-01

## 2022-01-01 RX ORDER — LEVALBUTEROL INHALATION SOLUTION 1.25 MG/3ML
1.25 SOLUTION RESPIRATORY (INHALATION) EVERY 8 HOURS
Status: DISCONTINUED | OUTPATIENT
Start: 2022-01-01 | End: 2022-01-01

## 2022-01-01 RX ORDER — MIDAZOLAM HYDROCHLORIDE 1 MG/ML
INJECTION INTRAMUSCULAR; INTRAVENOUS
Status: DISCONTINUED | OUTPATIENT
Start: 2022-01-01 | End: 2022-01-01

## 2022-01-01 RX ORDER — ACETAMINOPHEN 325 MG/1
650 TABLET ORAL EVERY 4 HOURS PRN
Status: DISCONTINUED | OUTPATIENT
Start: 2022-01-01 | End: 2022-01-01

## 2022-01-01 RX ORDER — FUROSEMIDE 10 MG/ML
20 INJECTION INTRAMUSCULAR; INTRAVENOUS DAILY
Status: DISCONTINUED | OUTPATIENT
Start: 2022-01-01 | End: 2022-01-01

## 2022-01-01 RX ORDER — HYDROCODONE BITARTRATE AND ACETAMINOPHEN 500; 5 MG/1; MG/1
TABLET ORAL
Status: DISCONTINUED | OUTPATIENT
Start: 2022-01-01 | End: 2022-01-01

## 2022-01-01 RX ORDER — METOPROLOL TARTRATE 25 MG/1
25 TABLET, FILM COATED ORAL 2 TIMES DAILY
COMMUNITY

## 2022-01-01 RX ORDER — SODIUM CHLORIDE 9 MG/ML
INJECTION, SOLUTION INTRAVENOUS
Status: DISPENSED
Start: 2022-01-01 | End: 2022-01-01

## 2022-01-01 RX ORDER — ALBUTEROL SULFATE 0.83 MG/ML
2.5 SOLUTION RESPIRATORY (INHALATION) EVERY 4 HOURS PRN
Status: DISCONTINUED | OUTPATIENT
Start: 2022-01-01 | End: 2022-01-01

## 2022-01-01 RX ORDER — HYDROCODONE BITARTRATE AND ACETAMINOPHEN 10; 325 MG/1; MG/1
1 TABLET ORAL EVERY 4 HOURS PRN
Status: DISCONTINUED | OUTPATIENT
Start: 2022-01-01 | End: 2022-01-01 | Stop reason: HOSPADM

## 2022-01-01 RX ORDER — HEPARIN SODIUM 5000 [USP'U]/ML
5000 INJECTION, SOLUTION INTRAVENOUS; SUBCUTANEOUS EVERY 8 HOURS
Status: DISCONTINUED | OUTPATIENT
Start: 2022-01-01 | End: 2022-01-01

## 2022-01-01 RX ORDER — FAMOTIDINE 10 MG/ML
20 INJECTION INTRAVENOUS EVERY 12 HOURS
Status: DISCONTINUED | OUTPATIENT
Start: 2022-01-01 | End: 2022-01-01

## 2022-01-01 RX ORDER — DOBUTAMINE HYDROCHLORIDE 400 MG/100ML
5 INJECTION INTRAVENOUS CONTINUOUS
Status: DISCONTINUED | OUTPATIENT
Start: 2022-01-01 | End: 2022-01-01

## 2022-01-01 RX ORDER — PREDNISONE 20 MG/1
20 TABLET ORAL DAILY
Qty: 5 TABLET | Refills: 0 | Status: SHIPPED | OUTPATIENT
Start: 2022-01-01 | End: 2022-01-01

## 2022-01-01 RX ORDER — ALBUTEROL SULFATE 0.83 MG/ML
2.5 SOLUTION RESPIRATORY (INHALATION)
Status: DISCONTINUED | OUTPATIENT
Start: 2022-01-01 | End: 2022-01-01

## 2022-01-01 RX ORDER — ACETAMINOPHEN 500 MG
1000 TABLET ORAL EVERY 6 HOURS PRN
Status: DISCONTINUED | OUTPATIENT
Start: 2022-01-01 | End: 2022-01-01 | Stop reason: HOSPADM

## 2022-01-01 RX ORDER — IPRATROPIUM BROMIDE AND ALBUTEROL SULFATE 2.5; .5 MG/3ML; MG/3ML
3 SOLUTION RESPIRATORY (INHALATION) EVERY 6 HOURS
Status: DISCONTINUED | OUTPATIENT
Start: 2022-01-01 | End: 2022-01-01

## 2022-01-01 RX ORDER — DEXAMETHASONE SODIUM PHOSPHATE 4 MG/ML
INJECTION, SOLUTION INTRA-ARTICULAR; INTRALESIONAL; INTRAMUSCULAR; INTRAVENOUS; SOFT TISSUE
Status: DISCONTINUED | OUTPATIENT
Start: 2022-01-01 | End: 2022-01-01

## 2022-01-01 RX ORDER — ONDANSETRON 2 MG/ML
4 INJECTION INTRAMUSCULAR; INTRAVENOUS
Status: COMPLETED | OUTPATIENT
Start: 2022-01-01 | End: 2022-01-01

## 2022-01-01 RX ORDER — LEVETIRACETAM 500 MG/5ML
1000 INJECTION, SOLUTION, CONCENTRATE INTRAVENOUS EVERY 12 HOURS
Status: DISCONTINUED | OUTPATIENT
Start: 2022-01-01 | End: 2022-01-01

## 2022-01-01 RX ORDER — MEPERIDINE HYDROCHLORIDE 25 MG/ML
25 INJECTION INTRAMUSCULAR; INTRAVENOUS; SUBCUTANEOUS EVERY 10 MIN PRN
Status: DISCONTINUED | OUTPATIENT
Start: 2022-01-01 | End: 2022-01-01 | Stop reason: HOSPADM

## 2022-01-01 RX ORDER — METOPROLOL TARTRATE 50 MG/1
50 TABLET ORAL 4 TIMES DAILY
Status: DISCONTINUED | OUTPATIENT
Start: 2022-01-01 | End: 2022-01-01

## 2022-01-01 RX ORDER — ALBUTEROL SULFATE 2.5 MG/.5ML
2.5 SOLUTION RESPIRATORY (INHALATION) EVERY 4 HOURS PRN
Qty: 90 EACH | Refills: 0 | Status: SHIPPED | OUTPATIENT
Start: 2022-01-01 | End: 2022-01-01

## 2022-01-01 RX ORDER — TALC
6 POWDER (GRAM) TOPICAL NIGHTLY PRN
Status: DISCONTINUED | OUTPATIENT
Start: 2022-01-01 | End: 2022-01-01

## 2022-01-01 RX ORDER — IPRATROPIUM BROMIDE AND ALBUTEROL SULFATE 2.5; .5 MG/3ML; MG/3ML
3 SOLUTION RESPIRATORY (INHALATION)
Status: DISPENSED | OUTPATIENT
Start: 2022-01-01 | End: 2022-01-01

## 2022-01-01 RX ORDER — FUROSEMIDE 10 MG/ML
40 INJECTION INTRAMUSCULAR; INTRAVENOUS 2 TIMES DAILY
Status: DISCONTINUED | OUTPATIENT
Start: 2022-01-01 | End: 2022-01-01

## 2022-01-01 RX ORDER — LIDOCAINE HYDROCHLORIDE 10 MG/ML
10 INJECTION INFILTRATION; PERINEURAL ONCE
Status: COMPLETED | OUTPATIENT
Start: 2022-01-01 | End: 2022-01-01

## 2022-01-01 RX ORDER — NAPROXEN SODIUM 220 MG/1
81 TABLET, FILM COATED ORAL DAILY
Status: DISCONTINUED | OUTPATIENT
Start: 2022-01-01 | End: 2022-01-01

## 2022-01-01 RX ORDER — EPINEPHRINE 0.1 MG/ML
INJECTION INTRAVENOUS CODE/TRAUMA/SEDATION MEDICATION
Status: COMPLETED | OUTPATIENT
Start: 2022-01-01 | End: 2022-01-01

## 2022-01-01 RX ORDER — FAMOTIDINE 10 MG/ML
20 INJECTION INTRAVENOUS DAILY
Status: DISCONTINUED | OUTPATIENT
Start: 2022-01-01 | End: 2022-01-01

## 2022-01-01 RX ORDER — INDOMETHACIN 25 MG/1
CAPSULE ORAL
Status: DISPENSED
Start: 2022-01-01 | End: 2022-01-01

## 2022-01-01 RX ORDER — LIDOCAINE HYDROCHLORIDE 20 MG/ML
INJECTION, SOLUTION EPIDURAL; INFILTRATION; INTRACAUDAL; PERINEURAL
Status: DISCONTINUED | OUTPATIENT
Start: 2022-01-01 | End: 2022-01-01

## 2022-01-01 RX ORDER — MORPHINE SULFATE 4 MG/ML
4 INJECTION, SOLUTION INTRAMUSCULAR; INTRAVENOUS
Status: COMPLETED | OUTPATIENT
Start: 2022-01-01 | End: 2022-01-01

## 2022-01-01 RX ORDER — ONDANSETRON 2 MG/ML
8 INJECTION INTRAMUSCULAR; INTRAVENOUS EVERY 8 HOURS PRN
Status: DISCONTINUED | OUTPATIENT
Start: 2022-01-01 | End: 2022-01-01

## 2022-01-01 RX ORDER — ONDANSETRON 2 MG/ML
INJECTION INTRAMUSCULAR; INTRAVENOUS
Status: DISCONTINUED | OUTPATIENT
Start: 2022-01-01 | End: 2022-01-01

## 2022-01-01 RX ORDER — GABAPENTIN 300 MG/1
300 CAPSULE ORAL 2 TIMES DAILY
Status: DISCONTINUED | OUTPATIENT
Start: 2022-01-01 | End: 2022-01-01

## 2022-01-01 RX ORDER — ATORVASTATIN CALCIUM 80 MG/1
80 TABLET, FILM COATED ORAL NIGHTLY
Status: DISCONTINUED | OUTPATIENT
Start: 2022-01-01 | End: 2022-01-01

## 2022-01-01 RX ORDER — SODIUM CHLORIDE, SODIUM LACTATE, POTASSIUM CHLORIDE, CALCIUM CHLORIDE 600; 310; 30; 20 MG/100ML; MG/100ML; MG/100ML; MG/100ML
125 INJECTION, SOLUTION INTRAVENOUS CONTINUOUS
Status: DISCONTINUED | OUTPATIENT
Start: 2022-01-01 | End: 2022-01-01 | Stop reason: HOSPADM

## 2022-01-01 RX ORDER — GABAPENTIN 300 MG/1
300 CAPSULE ORAL 2 TIMES DAILY
COMMUNITY

## 2022-01-01 RX ORDER — ALBUMIN HUMAN 250 G/1000ML
25 SOLUTION INTRAVENOUS ONCE
Status: COMPLETED | OUTPATIENT
Start: 2022-01-01 | End: 2022-01-01

## 2022-01-01 RX ORDER — OSELTAMIVIR PHOSPHATE 75 MG/1
75 CAPSULE ORAL 2 TIMES DAILY
Qty: 10 CAPSULE | Refills: 0 | Status: SHIPPED | OUTPATIENT
Start: 2022-01-01 | End: 2022-01-01

## 2022-01-01 RX ORDER — FUROSEMIDE 10 MG/ML
20 INJECTION INTRAMUSCULAR; INTRAVENOUS ONCE
Status: DISCONTINUED | OUTPATIENT
Start: 2022-01-01 | End: 2022-01-01

## 2022-01-01 RX ORDER — LIDOCAINE HYDROCHLORIDE 10 MG/ML
1 INJECTION, SOLUTION EPIDURAL; INFILTRATION; INTRACAUDAL; PERINEURAL ONCE AS NEEDED
Status: DISCONTINUED | OUTPATIENT
Start: 2022-01-01 | End: 2022-01-01 | Stop reason: HOSPADM

## 2022-01-01 RX ORDER — SULFAMETHOXAZOLE AND TRIMETHOPRIM 800; 160 MG/1; MG/1
1 TABLET ORAL 2 TIMES DAILY
Qty: 14 TABLET | Refills: 0 | Status: SHIPPED | OUTPATIENT
Start: 2022-01-01 | End: 2022-01-01

## 2022-01-01 RX ORDER — PROPOFOL 10 MG/ML
0-50 INJECTION, EMULSION INTRAVENOUS CONTINUOUS
Status: DISCONTINUED | OUTPATIENT
Start: 2022-01-01 | End: 2022-01-01

## 2022-01-01 RX ORDER — AZITHROMYCIN 250 MG/1
TABLET, FILM COATED ORAL
Qty: 6 TABLET | Refills: 0 | Status: SHIPPED | OUTPATIENT
Start: 2022-01-01

## 2022-01-01 RX ORDER — SODIUM CHLORIDE 9 MG/ML
INJECTION, SOLUTION INTRAVENOUS CONTINUOUS
Status: DISCONTINUED | OUTPATIENT
Start: 2022-01-01 | End: 2022-01-01 | Stop reason: HOSPADM

## 2022-01-01 RX ORDER — OSELTAMIVIR PHOSPHATE 75 MG/1
75 CAPSULE ORAL 2 TIMES DAILY
Status: DISCONTINUED | OUTPATIENT
Start: 2022-01-01 | End: 2022-01-01

## 2022-01-01 RX ORDER — CIPROFLOXACIN 500 MG/1
500 TABLET ORAL 2 TIMES DAILY
Qty: 20 TABLET | Refills: 0 | Status: SHIPPED | OUTPATIENT
Start: 2022-01-01 | End: 2022-01-01

## 2022-01-01 RX ORDER — FENTANYL CITRATE 50 UG/ML
INJECTION, SOLUTION INTRAMUSCULAR; INTRAVENOUS
Status: DISCONTINUED | OUTPATIENT
Start: 2022-01-01 | End: 2022-01-01

## 2022-01-01 RX ORDER — MORPHINE SULFATE 10 MG/ML
4 INJECTION INTRAMUSCULAR; INTRAVENOUS; SUBCUTANEOUS EVERY 5 MIN PRN
Status: DISCONTINUED | OUTPATIENT
Start: 2022-01-01 | End: 2022-01-01 | Stop reason: HOSPADM

## 2022-01-01 RX ORDER — LEVETIRACETAM 500 MG/5ML
500 INJECTION, SOLUTION, CONCENTRATE INTRAVENOUS EVERY 12 HOURS
Status: DISCONTINUED | OUTPATIENT
Start: 2022-01-01 | End: 2022-01-01

## 2022-01-01 RX ORDER — OSELTAMIVIR PHOSPHATE 30 MG/1
30 CAPSULE ORAL 2 TIMES DAILY
Status: DISCONTINUED | OUTPATIENT
Start: 2022-01-01 | End: 2022-01-01

## 2022-01-01 RX ORDER — SODIUM BICARBONATE 1 MEQ/ML
50 SYRINGE (ML) INTRAVENOUS ONCE
Status: COMPLETED | OUTPATIENT
Start: 2022-01-01 | End: 2022-01-01

## 2022-01-01 RX ORDER — LORAZEPAM 2 MG/ML
2 INJECTION INTRAMUSCULAR
Status: DISCONTINUED | OUTPATIENT
Start: 2022-01-01 | End: 2022-01-01 | Stop reason: HOSPADM

## 2022-01-01 RX ORDER — DEXAMETHASONE SODIUM PHOSPHATE 4 MG/ML
4 INJECTION, SOLUTION INTRA-ARTICULAR; INTRALESIONAL; INTRAMUSCULAR; INTRAVENOUS; SOFT TISSUE
Status: COMPLETED | OUTPATIENT
Start: 2022-01-01 | End: 2022-01-01

## 2022-01-01 RX ORDER — AMOXICILLIN AND CLAVULANATE POTASSIUM 875; 125 MG/1; MG/1
1 TABLET, FILM COATED ORAL EVERY 12 HOURS
Qty: 20 TABLET | Refills: 0 | Status: ON HOLD | OUTPATIENT
Start: 2022-01-01 | End: 2022-01-01

## 2022-01-01 RX ORDER — LORAZEPAM 2 MG/ML
1 INJECTION INTRAMUSCULAR EVERY 5 MIN PRN
Status: DISCONTINUED | OUTPATIENT
Start: 2022-01-01 | End: 2022-01-01

## 2022-01-01 RX ORDER — ONDANSETRON 4 MG/1
8 TABLET, ORALLY DISINTEGRATING ORAL EVERY 8 HOURS PRN
Status: DISCONTINUED | OUTPATIENT
Start: 2022-01-01 | End: 2022-01-01 | Stop reason: HOSPADM

## 2022-01-01 RX ORDER — PREDNISONE 20 MG/1
TABLET ORAL
Qty: 10 TABLET | Refills: 0 | Status: ON HOLD | OUTPATIENT
Start: 2022-01-01 | End: 2022-01-01 | Stop reason: HOSPADM

## 2022-01-01 RX ORDER — AMIODARONE HYDROCHLORIDE 200 MG/1
200 TABLET ORAL DAILY
Status: DISCONTINUED | OUTPATIENT
Start: 2022-01-01 | End: 2022-01-01

## 2022-01-01 RX ORDER — ALBUTEROL SULFATE 0.83 MG/ML
SOLUTION RESPIRATORY (INHALATION)
Status: DISCONTINUED
Start: 2022-01-01 | End: 2022-01-01 | Stop reason: WASHOUT

## 2022-01-01 RX ORDER — LIDOCAINE HYDROCHLORIDE 10 MG/ML
INJECTION, SOLUTION EPIDURAL; INFILTRATION; INTRACAUDAL; PERINEURAL
Status: DISCONTINUED | OUTPATIENT
Start: 2022-01-01 | End: 2022-01-01 | Stop reason: HOSPADM

## 2022-01-01 RX ORDER — DIGOXIN 0.25 MG/ML
250 INJECTION INTRAMUSCULAR; INTRAVENOUS ONCE
Status: COMPLETED | OUTPATIENT
Start: 2022-01-01 | End: 2022-01-01

## 2022-01-01 RX ORDER — CEFAZOLIN SODIUM 1 G/3ML
INJECTION, POWDER, FOR SOLUTION INTRAMUSCULAR; INTRAVENOUS
Status: DISCONTINUED | OUTPATIENT
Start: 2022-01-01 | End: 2022-01-01

## 2022-01-01 RX ORDER — ALBUTEROL SULFATE 90 UG/1
2 AEROSOL, METERED RESPIRATORY (INHALATION) EVERY 6 HOURS PRN
Qty: 18 G | Refills: 0 | Status: SHIPPED | OUTPATIENT
Start: 2022-01-01 | End: 2023-04-21

## 2022-01-01 RX ORDER — LEVETIRACETAM 500 MG/5ML
1000 INJECTION, SOLUTION, CONCENTRATE INTRAVENOUS ONCE
Status: COMPLETED | OUTPATIENT
Start: 2022-01-01 | End: 2022-01-01

## 2022-01-01 RX ORDER — CALCIUM GLUCONATE 20 MG/ML
1 INJECTION, SOLUTION INTRAVENOUS ONCE
Status: COMPLETED | OUTPATIENT
Start: 2022-01-01 | End: 2022-01-01

## 2022-01-01 RX ORDER — PROPOFOL 10 MG/ML
VIAL (ML) INTRAVENOUS
Status: DISCONTINUED | OUTPATIENT
Start: 2022-01-01 | End: 2022-01-01

## 2022-01-01 RX ORDER — HYDROCODONE BITARTRATE AND ACETAMINOPHEN 5; 325 MG/1; MG/1
1 TABLET ORAL EVERY 4 HOURS PRN
Status: DISCONTINUED | OUTPATIENT
Start: 2022-01-01 | End: 2022-01-01 | Stop reason: HOSPADM

## 2022-01-01 RX ORDER — SODIUM CHLORIDE, SODIUM LACTATE, POTASSIUM CHLORIDE, CALCIUM CHLORIDE 600; 310; 30; 20 MG/100ML; MG/100ML; MG/100ML; MG/100ML
INJECTION, SOLUTION INTRAVENOUS CONTINUOUS
Status: DISCONTINUED | OUTPATIENT
Start: 2022-01-01 | End: 2022-01-01 | Stop reason: HOSPADM

## 2022-01-01 RX ORDER — FUROSEMIDE 10 MG/ML
60 INJECTION INTRAMUSCULAR; INTRAVENOUS 2 TIMES DAILY
Status: DISCONTINUED | OUTPATIENT
Start: 2022-01-01 | End: 2022-01-01

## 2022-01-01 RX ORDER — DOPAMINE HYDROCHLORIDE 320 MG/100ML
2.5 INJECTION, SOLUTION INTRAVENOUS CONTINUOUS
Status: DISCONTINUED | OUTPATIENT
Start: 2022-01-01 | End: 2022-01-01

## 2022-01-01 RX ORDER — PROMETHAZINE HYDROCHLORIDE 25 MG/1
25 TABLET ORAL EVERY 6 HOURS PRN
Status: DISCONTINUED | OUTPATIENT
Start: 2022-01-01 | End: 2022-01-01 | Stop reason: HOSPADM

## 2022-01-01 RX ORDER — OXYCODONE HYDROCHLORIDE 5 MG/1
5 TABLET ORAL
Status: DISCONTINUED | OUTPATIENT
Start: 2022-01-01 | End: 2022-01-01 | Stop reason: HOSPADM

## 2022-01-01 RX ORDER — SODIUM CHLORIDE 0.9 % (FLUSH) 0.9 %
10 SYRINGE (ML) INJECTION
Status: DISCONTINUED | OUTPATIENT
Start: 2022-01-01 | End: 2022-01-01

## 2022-01-01 RX ORDER — METOPROLOL TARTRATE 25 MG/1
25 TABLET, FILM COATED ORAL 2 TIMES DAILY
Status: DISCONTINUED | OUTPATIENT
Start: 2022-01-01 | End: 2022-01-01

## 2022-01-01 RX ORDER — ONDANSETRON 2 MG/ML
4 INJECTION INTRAMUSCULAR; INTRAVENOUS DAILY PRN
Status: DISCONTINUED | OUTPATIENT
Start: 2022-01-01 | End: 2022-01-01 | Stop reason: HOSPADM

## 2022-01-01 RX ORDER — HYDRALAZINE HYDROCHLORIDE 20 MG/ML
10 INJECTION INTRAMUSCULAR; INTRAVENOUS
Status: COMPLETED | OUTPATIENT
Start: 2022-01-01 | End: 2022-01-01

## 2022-01-01 RX ORDER — ASPIRIN 300 MG/1
300 SUPPOSITORY RECTAL ONCE
Status: COMPLETED | OUTPATIENT
Start: 2022-01-01 | End: 2022-01-01

## 2022-01-01 RX ORDER — IPRATROPIUM BROMIDE AND ALBUTEROL SULFATE 2.5; .5 MG/3ML; MG/3ML
3 SOLUTION RESPIRATORY (INHALATION)
Status: COMPLETED | OUTPATIENT
Start: 2022-01-01 | End: 2022-01-01

## 2022-01-01 RX ORDER — DIPHENHYDRAMINE HYDROCHLORIDE 50 MG/ML
25 INJECTION INTRAMUSCULAR; INTRAVENOUS EVERY 6 HOURS PRN
Status: DISCONTINUED | OUTPATIENT
Start: 2022-01-01 | End: 2022-01-01 | Stop reason: HOSPADM

## 2022-01-01 RX ORDER — METHYLPREDNISOLONE SOD SUCC 125 MG
80 VIAL (EA) INJECTION
Status: COMPLETED | OUTPATIENT
Start: 2022-01-01 | End: 2022-01-01

## 2022-01-01 RX ORDER — MAGNESIUM SULFATE HEPTAHYDRATE 40 MG/ML
0.5 INJECTION, SOLUTION INTRAVENOUS CONTINUOUS
Status: DISCONTINUED | OUTPATIENT
Start: 2022-01-01 | End: 2022-01-01

## 2022-01-01 RX ORDER — SODIUM CHLORIDE 9 MG/ML
INJECTION, SOLUTION INTRAVENOUS
Status: COMPLETED
Start: 2022-01-01 | End: 2022-01-01

## 2022-01-01 RX ORDER — DILTIAZEM HYDROCHLORIDE 60 MG/1
60 TABLET, FILM COATED ORAL EVERY 6 HOURS
Status: DISCONTINUED | OUTPATIENT
Start: 2022-01-01 | End: 2022-01-01

## 2022-01-01 RX ORDER — MUPIROCIN 20 MG/G
OINTMENT TOPICAL 2 TIMES DAILY
Status: COMPLETED | OUTPATIENT
Start: 2022-01-01 | End: 2022-01-01

## 2022-01-01 RX ORDER — IPRATROPIUM BROMIDE 0.5 MG/2.5ML
0.5 SOLUTION RESPIRATORY (INHALATION) EVERY 8 HOURS
Status: DISCONTINUED | OUTPATIENT
Start: 2022-01-01 | End: 2022-01-01

## 2022-01-01 RX ORDER — OSELTAMIVIR PHOSPHATE 30 MG/1
30 CAPSULE ORAL DAILY
Status: COMPLETED | OUTPATIENT
Start: 2022-01-01 | End: 2022-01-01

## 2022-01-01 RX ORDER — SODIUM CHLORIDE 9 MG/ML
1000 INJECTION, SOLUTION INTRAVENOUS
Status: COMPLETED | OUTPATIENT
Start: 2022-01-01 | End: 2022-01-01

## 2022-01-01 RX ORDER — LEVALBUTEROL INHALATION SOLUTION 1.25 MG/3ML
1.25 SOLUTION RESPIRATORY (INHALATION) EVERY 4 HOURS PRN
Status: DISCONTINUED | OUTPATIENT
Start: 2022-01-01 | End: 2022-01-01

## 2022-01-01 RX ORDER — HEPARIN SODIUM,PORCINE/D5W 25000/250
0-40 INTRAVENOUS SOLUTION INTRAVENOUS CONTINUOUS
Status: DISCONTINUED | OUTPATIENT
Start: 2022-01-01 | End: 2022-01-01

## 2022-01-01 RX ORDER — MEPERIDINE HYDROCHLORIDE 25 MG/ML
25 INJECTION INTRAMUSCULAR; INTRAVENOUS; SUBCUTANEOUS EVERY 6 HOURS PRN
Status: ACTIVE | OUTPATIENT
Start: 2022-01-01 | End: 2022-01-01

## 2022-01-01 RX ORDER — POTASSIUM CHLORIDE 7.45 MG/ML
40 INJECTION INTRAVENOUS EVERY 6 HOURS
Status: COMPLETED | OUTPATIENT
Start: 2022-01-01 | End: 2022-01-01

## 2022-01-01 RX ORDER — HYDROMORPHONE HYDROCHLORIDE 2 MG/ML
0.5 INJECTION, SOLUTION INTRAMUSCULAR; INTRAVENOUS; SUBCUTANEOUS EVERY 5 MIN PRN
Status: DISCONTINUED | OUTPATIENT
Start: 2022-01-01 | End: 2022-01-01 | Stop reason: HOSPADM

## 2022-01-01 RX ADMIN — LEVALBUTEROL HYDROCHLORIDE 1.25 MG: 1.25 SOLUTION RESPIRATORY (INHALATION) at 12:12

## 2022-01-01 RX ADMIN — ONDANSETRON HYDROCHLORIDE 4 MG: 2 SOLUTION INTRAMUSCULAR; INTRAVENOUS at 03:11

## 2022-01-01 RX ADMIN — IOPAMIDOL 100 ML: 755 INJECTION, SOLUTION INTRAVENOUS at 10:10

## 2022-01-01 RX ADMIN — IPRATROPIUM BROMIDE 0.5 MG: 0.5 SOLUTION RESPIRATORY (INHALATION) at 07:11

## 2022-01-01 RX ADMIN — IPRATROPIUM BROMIDE AND ALBUTEROL SULFATE 3 ML: .5; 3 SOLUTION RESPIRATORY (INHALATION) at 06:04

## 2022-01-01 RX ADMIN — GABAPENTIN 300 MG: 300 CAPSULE ORAL at 09:11

## 2022-01-01 RX ADMIN — HEPARIN SODIUM 5000 UNITS: 5000 INJECTION, SOLUTION INTRAVENOUS; SUBCUTANEOUS at 01:12

## 2022-01-01 RX ADMIN — PIPERACILLIN AND TAZOBACTAM 4.5 G: 4; .5 INJECTION, POWDER, LYOPHILIZED, FOR SOLUTION INTRAVENOUS; PARENTERAL at 05:11

## 2022-01-01 RX ADMIN — PIPERACILLIN AND TAZOBACTAM 4.5 G: 4; .5 INJECTION, POWDER, LYOPHILIZED, FOR SOLUTION INTRAVENOUS; PARENTERAL at 10:11

## 2022-01-01 RX ADMIN — MUPIROCIN: 20 OINTMENT TOPICAL at 09:11

## 2022-01-01 RX ADMIN — LEVALBUTEROL HYDROCHLORIDE 1.25 MG: 1.25 SOLUTION RESPIRATORY (INHALATION) at 12:11

## 2022-01-01 RX ADMIN — FAMOTIDINE 20 MG: 10 INJECTION, SOLUTION INTRAVENOUS at 09:11

## 2022-01-01 RX ADMIN — METHYLPREDNISOLONE SODIUM SUCCINATE 40 MG: 40 INJECTION, POWDER, FOR SOLUTION INTRAMUSCULAR; INTRAVENOUS at 06:11

## 2022-01-01 RX ADMIN — LEVALBUTEROL HYDROCHLORIDE 1.25 MG: 1.25 SOLUTION RESPIRATORY (INHALATION) at 03:12

## 2022-01-01 RX ADMIN — POTASSIUM CHLORIDE 40 MEQ: 7.46 INJECTION, SOLUTION INTRAVENOUS at 09:11

## 2022-01-01 RX ADMIN — LEVETIRACETAM 1000 MG: 100 INJECTION, SOLUTION INTRAVENOUS at 09:11

## 2022-01-01 RX ADMIN — ASPIRIN 81 MG CHEWABLE TABLET 81 MG: 81 TABLET CHEWABLE at 09:12

## 2022-01-01 RX ADMIN — LEVALBUTEROL HYDROCHLORIDE 1.25 MG: 1.25 SOLUTION RESPIRATORY (INHALATION) at 02:11

## 2022-01-01 RX ADMIN — LIDOCAINE HYDROCHLORIDE 10 ML: 10 INJECTION, SOLUTION INFILTRATION; PERINEURAL at 12:11

## 2022-01-01 RX ADMIN — FUROSEMIDE 60 MG: 10 INJECTION, SOLUTION INTRAMUSCULAR; INTRAVENOUS at 09:11

## 2022-01-01 RX ADMIN — FAMOTIDINE 20 MG: 10 INJECTION, SOLUTION INTRAVENOUS at 08:12

## 2022-01-01 RX ADMIN — HEPARIN SODIUM 15 UNITS/KG/HR: 10000 INJECTION, SOLUTION INTRAVENOUS at 01:11

## 2022-01-01 RX ADMIN — SODIUM CHLORIDE: 9 INJECTION, SOLUTION INTRAVENOUS at 06:11

## 2022-01-01 RX ADMIN — MUPIROCIN: 20 OINTMENT TOPICAL at 08:11

## 2022-01-01 RX ADMIN — PIPERACILLIN AND TAZOBACTAM 4.5 G: 4; .5 INJECTION, POWDER, LYOPHILIZED, FOR SOLUTION INTRAVENOUS; PARENTERAL at 03:11

## 2022-01-01 RX ADMIN — SODIUM CHLORIDE: 9 INJECTION, SOLUTION INTRAVENOUS at 11:11

## 2022-01-01 RX ADMIN — HEPARIN SODIUM 5000 UNITS: 5000 INJECTION, SOLUTION INTRAVENOUS; SUBCUTANEOUS at 09:12

## 2022-01-01 RX ADMIN — APIXABAN 5 MG: 5 TABLET, FILM COATED ORAL at 11:11

## 2022-01-01 RX ADMIN — PIPERACILLIN AND TAZOBACTAM 4.5 G: 4; .5 INJECTION, POWDER, LYOPHILIZED, FOR SOLUTION INTRAVENOUS; PARENTERAL at 09:11

## 2022-01-01 RX ADMIN — METHYLPREDNISOLONE SODIUM SUCCINATE 40 MG: 40 INJECTION, POWDER, FOR SOLUTION INTRAMUSCULAR; INTRAVENOUS at 01:11

## 2022-01-01 RX ADMIN — METHYLPREDNISOLONE SODIUM SUCCINATE 40 MG: 40 INJECTION, POWDER, FOR SOLUTION INTRAMUSCULAR; INTRAVENOUS at 11:11

## 2022-01-01 RX ADMIN — ATORVASTATIN CALCIUM 80 MG: 80 TABLET, FILM COATED ORAL at 09:12

## 2022-01-01 RX ADMIN — LEVETIRACETAM 1000 MG: 100 INJECTION, SOLUTION INTRAVENOUS at 08:12

## 2022-01-01 RX ADMIN — SODIUM BICARBONATE 50 MEQ: 84 INJECTION INTRAVENOUS at 09:11

## 2022-01-01 RX ADMIN — HYDRALAZINE HYDROCHLORIDE 10 MG: 20 INJECTION INTRAMUSCULAR; INTRAVENOUS at 01:11

## 2022-01-01 RX ADMIN — LIDOCAINE HYDROCHLORIDE 80 MG: 20 INJECTION, SOLUTION INTRAVENOUS at 08:08

## 2022-01-01 RX ADMIN — LORAZEPAM 1 MG: 2 INJECTION INTRAMUSCULAR; INTRAVENOUS at 04:11

## 2022-01-01 RX ADMIN — METOLAZONE 5 MG: 5 TABLET ORAL at 09:11

## 2022-01-01 RX ADMIN — ALBUMIN (HUMAN) 25 G: 12.5 SOLUTION INTRAVENOUS at 01:11

## 2022-01-01 RX ADMIN — ATORVASTATIN CALCIUM 80 MG: 80 TABLET, FILM COATED ORAL at 11:11

## 2022-01-01 RX ADMIN — AMIODARONE HYDROCHLORIDE 200 MG: 200 TABLET ORAL at 09:12

## 2022-01-01 RX ADMIN — METOLAZONE 5 MG: 5 TABLET ORAL at 08:12

## 2022-01-01 RX ADMIN — GABAPENTIN 300 MG: 300 CAPSULE ORAL at 08:12

## 2022-01-01 RX ADMIN — MORPHINE SULFATE 4 MG: 10 INJECTION INTRAVENOUS at 10:08

## 2022-01-01 RX ADMIN — LORAZEPAM 1 MG: 2 INJECTION INTRAMUSCULAR; INTRAVENOUS at 05:12

## 2022-01-01 RX ADMIN — AMIODARONE HYDROCHLORIDE 150 MG: 50 INJECTION, SOLUTION INTRAVENOUS at 11:11

## 2022-01-01 RX ADMIN — AMIODARONE HYDROCHLORIDE 200 MG: 200 TABLET ORAL at 08:12

## 2022-01-01 RX ADMIN — LEVALBUTEROL HYDROCHLORIDE 1.25 MG: 1.25 SOLUTION RESPIRATORY (INHALATION) at 07:12

## 2022-01-01 RX ADMIN — MORPHINE SULFATE 4 MG: 4 INJECTION INTRAVENOUS at 03:11

## 2022-01-01 RX ADMIN — IPRATROPIUM BROMIDE 0.5 MG: 0.5 SOLUTION RESPIRATORY (INHALATION) at 12:11

## 2022-01-01 RX ADMIN — DEXTROSE MONOHYDRATE 125 ML: 100 INJECTION, SOLUTION INTRAVENOUS at 09:11

## 2022-01-01 RX ADMIN — NOREPINEPHRINE BITARTRATE 0.1 MCG/KG/MIN: 1 INJECTION, SOLUTION, CONCENTRATE INTRAVENOUS at 12:11

## 2022-01-01 RX ADMIN — EPHEDRINE SULFATE 15 MG: 50 INJECTION INTRAVENOUS at 08:08

## 2022-01-01 RX ADMIN — MUPIROCIN: 20 OINTMENT TOPICAL at 11:11

## 2022-01-01 RX ADMIN — METOLAZONE 5 MG: 5 TABLET ORAL at 05:12

## 2022-01-01 RX ADMIN — LORAZEPAM 1 MG: 2 INJECTION INTRAMUSCULAR; INTRAVENOUS at 06:11

## 2022-01-01 RX ADMIN — ATORVASTATIN CALCIUM 80 MG: 80 TABLET, FILM COATED ORAL at 09:11

## 2022-01-01 RX ADMIN — VANCOMYCIN HYDROCHLORIDE 1000 MG: 1 INJECTION, POWDER, LYOPHILIZED, FOR SOLUTION INTRAVENOUS at 12:12

## 2022-01-01 RX ADMIN — LORAZEPAM 1 MG: 2 INJECTION INTRAMUSCULAR; INTRAVENOUS at 05:11

## 2022-01-01 RX ADMIN — MIDAZOLAM 0.5 MG/HR: 5 INJECTION INTRAMUSCULAR; INTRAVENOUS at 09:11

## 2022-01-01 RX ADMIN — PIPERACILLIN AND TAZOBACTAM 4.5 G: 4; .5 INJECTION, POWDER, LYOPHILIZED, FOR SOLUTION INTRAVENOUS; PARENTERAL at 12:11

## 2022-01-01 RX ADMIN — METHYLPREDNISOLONE SODIUM SUCCINATE 40 MG: 40 INJECTION, POWDER, FOR SOLUTION INTRAMUSCULAR; INTRAVENOUS at 12:12

## 2022-01-01 RX ADMIN — IPRATROPIUM BROMIDE 0.5 MG: 0.5 SOLUTION RESPIRATORY (INHALATION) at 03:11

## 2022-01-01 RX ADMIN — IPRATROPIUM BROMIDE 0.5 MG: 0.5 SOLUTION RESPIRATORY (INHALATION) at 11:12

## 2022-01-01 RX ADMIN — ASPIRIN 81 MG CHEWABLE TABLET 81 MG: 81 TABLET CHEWABLE at 09:11

## 2022-01-01 RX ADMIN — LEVETIRACETAM 1000 MG: 100 INJECTION, SOLUTION INTRAVENOUS at 09:12

## 2022-01-01 RX ADMIN — LEVETIRACETAM 1000 MG: 100 INJECTION, SOLUTION INTRAVENOUS at 03:11

## 2022-01-01 RX ADMIN — FUROSEMIDE 60 MG: 10 INJECTION, SOLUTION INTRAMUSCULAR; INTRAVENOUS at 05:11

## 2022-01-01 RX ADMIN — HEPARIN SODIUM 12 UNITS/KG/HR: 10000 INJECTION, SOLUTION INTRAVENOUS at 03:11

## 2022-01-01 RX ADMIN — OSELTAMAVIR PHOSPHATE 30 MG: 30 CAPSULE ORAL at 08:12

## 2022-01-01 RX ADMIN — AMIODARONE HYDROCHLORIDE 0.5 MG/MIN: 50 INJECTION, SOLUTION INTRAVENOUS at 11:11

## 2022-01-01 RX ADMIN — METOLAZONE 5 MG: 5 TABLET ORAL at 08:11

## 2022-01-01 RX ADMIN — LEVALBUTEROL HYDROCHLORIDE 1.25 MG: 1.25 SOLUTION RESPIRATORY (INHALATION) at 03:11

## 2022-01-01 RX ADMIN — METOLAZONE 5 MG: 5 TABLET ORAL at 05:11

## 2022-01-01 RX ADMIN — LEVETIRACETAM 1000 MG: 100 INJECTION, SOLUTION INTRAVENOUS at 08:11

## 2022-01-01 RX ADMIN — NOREPINEPHRINE BITARTRATE 0.18 MCG/KG/MIN: 1 INJECTION, SOLUTION, CONCENTRATE INTRAVENOUS at 05:11

## 2022-01-01 RX ADMIN — LEVETIRACETAM 500 MG: 100 INJECTION, SOLUTION INTRAVENOUS at 09:11

## 2022-01-01 RX ADMIN — NOREPINEPHRINE BITARTRATE 1 MCG/KG/MIN: 1 INJECTION, SOLUTION, CONCENTRATE INTRAVENOUS at 11:11

## 2022-01-01 RX ADMIN — DOPAMINE HYDROCHLORIDE 5 MCG/KG/MIN: 320 INJECTION, SOLUTION INTRAVENOUS at 05:11

## 2022-01-01 RX ADMIN — LEVALBUTEROL HYDROCHLORIDE 1.25 MG: 1.25 SOLUTION RESPIRATORY (INHALATION) at 11:11

## 2022-01-01 RX ADMIN — SODIUM CHLORIDE: 9 INJECTION, SOLUTION INTRAVENOUS at 05:11

## 2022-01-01 RX ADMIN — LEVALBUTEROL HYDROCHLORIDE 1.25 MG: 1.25 SOLUTION RESPIRATORY (INHALATION) at 07:11

## 2022-01-01 RX ADMIN — DILTIAZEM HYDROCHLORIDE 60 MG: 60 TABLET, FILM COATED ORAL at 09:11

## 2022-01-01 RX ADMIN — PIPERACILLIN AND TAZOBACTAM 4.5 G: 4; .5 INJECTION, POWDER, LYOPHILIZED, FOR SOLUTION INTRAVENOUS; PARENTERAL at 01:11

## 2022-01-01 RX ADMIN — SODIUM CHLORIDE: 9 INJECTION, SOLUTION INTRAVENOUS at 03:11

## 2022-01-01 RX ADMIN — METHYLPREDNISOLONE SODIUM SUCCINATE 40 MG: 40 INJECTION, POWDER, FOR SOLUTION INTRAMUSCULAR; INTRAVENOUS at 05:11

## 2022-01-01 RX ADMIN — HYDROCODONE BITARTRATE AND ACETAMINOPHEN 1 TABLET: 10; 325 TABLET ORAL at 10:08

## 2022-01-01 RX ADMIN — LORAZEPAM 2 MG: 2 INJECTION INTRAMUSCULAR; INTRAVENOUS at 12:12

## 2022-01-01 RX ADMIN — AMIODARONE HYDROCHLORIDE 0.5 MG/MIN: 50 INJECTION, SOLUTION INTRAVENOUS at 04:12

## 2022-01-01 RX ADMIN — HEPARIN SODIUM 5000 UNITS: 5000 INJECTION, SOLUTION INTRAVENOUS; SUBCUTANEOUS at 09:11

## 2022-01-01 RX ADMIN — ONDANSETRON 4 MG: 2 INJECTION INTRAMUSCULAR; INTRAVENOUS at 08:08

## 2022-01-01 RX ADMIN — PHENYLEPHRINE HYDROCHLORIDE 300 MCG: 10 INJECTION INTRAVENOUS at 08:08

## 2022-01-01 RX ADMIN — NOREPINEPHRINE BITARTRATE 0.16 MCG/KG/MIN: 1 INJECTION, SOLUTION, CONCENTRATE INTRAVENOUS at 01:11

## 2022-01-01 RX ADMIN — POTASSIUM CHLORIDE 40 MEQ: 7.46 INJECTION, SOLUTION INTRAVENOUS at 05:11

## 2022-01-01 RX ADMIN — DEXTROSE MONOHYDRATE 150 MG: 50 INJECTION, SOLUTION INTRAVENOUS at 06:11

## 2022-01-01 RX ADMIN — MIDAZOLAM 2 MG: 1 INJECTION INTRAMUSCULAR; INTRAVENOUS at 08:08

## 2022-01-01 RX ADMIN — VANCOMYCIN HYDROCHLORIDE 1000 MG: 1 INJECTION, POWDER, LYOPHILIZED, FOR SOLUTION INTRAVENOUS at 11:11

## 2022-01-01 RX ADMIN — HEPARIN SODIUM 5000 UNITS: 5000 INJECTION, SOLUTION INTRAVENOUS; SUBCUTANEOUS at 06:12

## 2022-01-01 RX ADMIN — HEPARIN SODIUM 5000 UNITS: 5000 INJECTION, SOLUTION INTRAVENOUS; SUBCUTANEOUS at 01:11

## 2022-01-01 RX ADMIN — APIXABAN 5 MG: 5 TABLET, FILM COATED ORAL at 09:11

## 2022-01-01 RX ADMIN — IOPAMIDOL 100 ML: 755 INJECTION, SOLUTION INTRAVENOUS at 11:07

## 2022-01-01 RX ADMIN — OSELTAMAVIR PHOSPHATE 30 MG: 30 CAPSULE ORAL at 09:11

## 2022-01-01 RX ADMIN — NOREPINEPHRINE BITARTRATE 0.04 MCG/KG/MIN: 1 INJECTION, SOLUTION, CONCENTRATE INTRAVENOUS at 01:11

## 2022-01-01 RX ADMIN — GABAPENTIN 300 MG: 300 CAPSULE ORAL at 11:11

## 2022-01-01 RX ADMIN — DOBUTAMINE HYDROCHLORIDE 5 MCG/KG/MIN: 400 INJECTION INTRAVENOUS at 10:12

## 2022-01-01 RX ADMIN — AMIODARONE HYDROCHLORIDE 200 MG: 200 TABLET ORAL at 10:12

## 2022-01-01 RX ADMIN — LORAZEPAM 2 MG: 2 INJECTION INTRAMUSCULAR; INTRAVENOUS at 03:12

## 2022-01-01 RX ADMIN — HEPARIN SODIUM 12 UNITS/KG/HR: 10000 INJECTION, SOLUTION INTRAVENOUS at 11:11

## 2022-01-01 RX ADMIN — OSELTAMAVIR PHOSPHATE 30 MG: 30 CAPSULE ORAL at 08:11

## 2022-01-01 RX ADMIN — DOPAMINE HYDROCHLORIDE 2.5 MCG/KG/MIN: 320 INJECTION, SOLUTION INTRAVENOUS at 06:12

## 2022-01-01 RX ADMIN — MIDAZOLAM 2 MG/HR: 5 INJECTION INTRAMUSCULAR; INTRAVENOUS at 03:11

## 2022-01-01 RX ADMIN — HEPARIN SODIUM 5000 UNITS: 5000 INJECTION, SOLUTION INTRAVENOUS; SUBCUTANEOUS at 05:11

## 2022-01-01 RX ADMIN — IPRATROPIUM BROMIDE 0.5 MG: 0.5 SOLUTION RESPIRATORY (INHALATION) at 09:12

## 2022-01-01 RX ADMIN — EPINEPHRINE 0.1 MG: 0.1 INJECTION, SOLUTION ENDOTRACHEAL; INTRACARDIAC; INTRAVENOUS at 11:11

## 2022-01-01 RX ADMIN — NOREPINEPHRINE BITARTRATE 0.7 MCG/KG/MIN: 1 INJECTION, SOLUTION, CONCENTRATE INTRAVENOUS at 11:11

## 2022-01-01 RX ADMIN — IPRATROPIUM BROMIDE 0.5 MG: 0.5 SOLUTION RESPIRATORY (INHALATION) at 03:12

## 2022-01-01 RX ADMIN — CALCIUM GLUCONATE 1 G: 20 INJECTION, SOLUTION INTRAVENOUS at 09:11

## 2022-01-01 RX ADMIN — DILTIAZEM HYDROCHLORIDE 120 MG: 120 CAPSULE, COATED, EXTENDED RELEASE ORAL at 03:11

## 2022-01-01 RX ADMIN — DEXAMETHASONE SODIUM PHOSPHATE 4 MG: 4 INJECTION, SOLUTION INTRA-ARTICULAR; INTRALESIONAL; INTRAMUSCULAR; INTRAVENOUS; SOFT TISSUE at 08:08

## 2022-01-01 RX ADMIN — GABAPENTIN 300 MG: 300 CAPSULE ORAL at 08:11

## 2022-01-01 RX ADMIN — ATORVASTATIN CALCIUM 80 MG: 80 TABLET, FILM COATED ORAL at 08:11

## 2022-01-01 RX ADMIN — SODIUM CHLORIDE 1000 ML: 9 INJECTION, SOLUTION INTRAVENOUS at 05:11

## 2022-01-01 RX ADMIN — METHYLPREDNISOLONE SODIUM SUCCINATE 40 MG: 40 INJECTION, POWDER, FOR SOLUTION INTRAMUSCULAR; INTRAVENOUS at 07:11

## 2022-01-01 RX ADMIN — IPRATROPIUM BROMIDE 0.5 MG: 0.5 SOLUTION RESPIRATORY (INHALATION) at 07:12

## 2022-01-01 RX ADMIN — VANCOMYCIN HYDROCHLORIDE 1000 MG: 1 INJECTION, POWDER, LYOPHILIZED, FOR SOLUTION INTRAVENOUS at 01:11

## 2022-01-01 RX ADMIN — PIPERACILLIN AND TAZOBACTAM 4.5 G: 4; .5 INJECTION, POWDER, LYOPHILIZED, FOR SOLUTION INTRAVENOUS; PARENTERAL at 12:12

## 2022-01-01 RX ADMIN — METHYLPREDNISOLONE SODIUM SUCCINATE 40 MG: 40 INJECTION, POWDER, FOR SOLUTION INTRAMUSCULAR; INTRAVENOUS at 12:11

## 2022-01-01 RX ADMIN — FAMOTIDINE 20 MG: 10 INJECTION, SOLUTION INTRAVENOUS at 09:12

## 2022-01-01 RX ADMIN — LORAZEPAM 1 MG: 2 INJECTION INTRAMUSCULAR; INTRAVENOUS at 02:11

## 2022-01-01 RX ADMIN — NOREPINEPHRINE BITARTRATE 0.74 MCG/KG/MIN: 1 INJECTION, SOLUTION, CONCENTRATE INTRAVENOUS at 06:11

## 2022-01-01 RX ADMIN — LORAZEPAM 1 MG: 2 INJECTION INTRAMUSCULAR; INTRAVENOUS at 08:12

## 2022-01-01 RX ADMIN — PROPOFOL 5 MCG/KG/MIN: 10 INJECTION, EMULSION INTRAVENOUS at 05:11

## 2022-01-01 RX ADMIN — FUROSEMIDE 20 MG: 10 INJECTION, SOLUTION INTRAMUSCULAR; INTRAVENOUS at 10:11

## 2022-01-01 RX ADMIN — PIPERACILLIN AND TAZOBACTAM 4.5 G: 4; .5 INJECTION, POWDER, LYOPHILIZED, FOR SOLUTION INTRAVENOUS; PARENTERAL at 06:11

## 2022-01-01 RX ADMIN — FAMOTIDINE 20 MG: 10 INJECTION, SOLUTION INTRAVENOUS at 11:11

## 2022-01-01 RX ADMIN — IPRATROPIUM BROMIDE AND ALBUTEROL SULFATE 3 ML: 2.5; .5 SOLUTION RESPIRATORY (INHALATION) at 01:11

## 2022-01-01 RX ADMIN — EPHEDRINE SULFATE 5 MG: 50 INJECTION INTRAVENOUS at 09:08

## 2022-01-01 RX ADMIN — ACETAMINOPHEN 650 MG: 325 TABLET ORAL at 09:12

## 2022-01-01 RX ADMIN — CEFAZOLIN 2 G: 1 INJECTION, POWDER, FOR SOLUTION INTRAMUSCULAR; INTRAVENOUS; PARENTERAL at 08:08

## 2022-01-01 RX ADMIN — METOPROLOL TARTRATE 25 MG: 25 TABLET, FILM COATED ORAL at 11:11

## 2022-01-01 RX ADMIN — HEPARIN SODIUM 5000 UNITS: 5000 INJECTION, SOLUTION INTRAVENOUS; SUBCUTANEOUS at 05:12

## 2022-01-01 RX ADMIN — GABAPENTIN 300 MG: 300 CAPSULE ORAL at 09:12

## 2022-01-01 RX ADMIN — DOPAMINE HYDROCHLORIDE 5 MCG/KG/MIN: 320 INJECTION, SOLUTION INTRAVENOUS at 11:11

## 2022-01-01 RX ADMIN — IPRATROPIUM BROMIDE 0.5 MG: 0.5 SOLUTION RESPIRATORY (INHALATION) at 02:11

## 2022-01-01 RX ADMIN — LEVALBUTEROL HYDROCHLORIDE 1.25 MG: 1.25 SOLUTION RESPIRATORY (INHALATION) at 09:12

## 2022-01-01 RX ADMIN — AMIODARONE HYDROCHLORIDE 1 MG/MIN: 50 INJECTION, SOLUTION INTRAVENOUS at 08:11

## 2022-01-01 RX ADMIN — FUROSEMIDE 60 MG: 10 INJECTION, SOLUTION INTRAMUSCULAR; INTRAVENOUS at 02:11

## 2022-01-01 RX ADMIN — NOREPINEPHRINE BITARTRATE 0.66 MCG/KG/MIN: 1 INJECTION, SOLUTION, CONCENTRATE INTRAVENOUS at 01:11

## 2022-01-01 RX ADMIN — ASPIRIN 81 MG CHEWABLE TABLET 81 MG: 81 TABLET CHEWABLE at 08:12

## 2022-01-01 RX ADMIN — AMIODARONE HYDROCHLORIDE 0.5 MG/MIN: 50 INJECTION, SOLUTION INTRAVENOUS at 09:11

## 2022-01-01 RX ADMIN — AMIODARONE HYDROCHLORIDE 0.5 MG/MIN: 50 INJECTION, SOLUTION INTRAVENOUS at 01:11

## 2022-01-01 RX ADMIN — NOREPINEPHRINE BITARTRATE 0.16 MCG/KG/MIN: 1 INJECTION, SOLUTION, CONCENTRATE INTRAVENOUS at 08:11

## 2022-01-01 RX ADMIN — VANCOMYCIN HYDROCHLORIDE 1000 MG: 1 INJECTION, POWDER, LYOPHILIZED, FOR SOLUTION INTRAVENOUS at 12:11

## 2022-01-01 RX ADMIN — FENTANYL CITRATE 100 MCG: 50 INJECTION INTRAMUSCULAR; INTRAVENOUS at 08:08

## 2022-01-01 RX ADMIN — MORPHINE SULFATE 4 MG: 4 INJECTION INTRAVENOUS at 03:12

## 2022-01-01 RX ADMIN — PROPOFOL 100 MG: 10 INJECTION, EMULSION INTRAVENOUS at 08:08

## 2022-01-01 RX ADMIN — PHENYLEPHRINE HYDROCHLORIDE 200 MCG: 10 INJECTION INTRAVENOUS at 08:08

## 2022-01-01 RX ADMIN — SODIUM CHLORIDE: 9 INJECTION, SOLUTION INTRAVENOUS at 08:11

## 2022-01-01 RX ADMIN — SODIUM CHLORIDE 1000 ML: 9 INJECTION, SOLUTION INTRAVENOUS at 12:11

## 2022-01-01 RX ADMIN — METOLAZONE 5 MG: 5 TABLET ORAL at 09:12

## 2022-01-01 RX ADMIN — IPRATROPIUM BROMIDE 0.5 MG: 0.5 SOLUTION RESPIRATORY (INHALATION) at 12:12

## 2022-01-01 RX ADMIN — METHYLPREDNISOLONE SODIUM SUCCINATE 40 MG: 40 INJECTION, POWDER, FOR SOLUTION INTRAMUSCULAR; INTRAVENOUS at 06:12

## 2022-01-01 RX ADMIN — METHYLPREDNISOLONE SODIUM SUCCINATE 80 MG: 125 INJECTION, POWDER, FOR SOLUTION INTRAMUSCULAR; INTRAVENOUS at 06:04

## 2022-01-01 RX ADMIN — LEVALBUTEROL HYDROCHLORIDE 1.25 MG: 1.25 SOLUTION RESPIRATORY (INHALATION) at 11:12

## 2022-01-01 RX ADMIN — PIPERACILLIN AND TAZOBACTAM 4.5 G: 4; .5 INJECTION, POWDER, LYOPHILIZED, FOR SOLUTION INTRAVENOUS; PARENTERAL at 02:11

## 2022-01-01 RX ADMIN — IOPAMIDOL 100 ML: 755 INJECTION, SOLUTION INTRAVENOUS at 02:11

## 2022-01-01 RX ADMIN — SODIUM CHLORIDE 1000 ML: 9 INJECTION, SOLUTION INTRAVENOUS at 07:11

## 2022-01-01 RX ADMIN — MIDAZOLAM 5 MG/HR: 5 INJECTION INTRAMUSCULAR; INTRAVENOUS at 05:11

## 2022-01-01 RX ADMIN — LEVETIRACETAM 1000 MG: 100 INJECTION, SOLUTION INTRAVENOUS at 06:11

## 2022-01-01 RX ADMIN — HUMAN INSULIN 10 UNITS: 100 INJECTION, SOLUTION SUBCUTANEOUS at 09:11

## 2022-01-01 RX ADMIN — FAMOTIDINE 20 MG: 10 INJECTION, SOLUTION INTRAVENOUS at 08:11

## 2022-01-01 RX ADMIN — DOBUTAMINE HYDROCHLORIDE 5 MCG/KG/MIN: 400 INJECTION INTRAVENOUS at 06:11

## 2022-01-01 RX ADMIN — METOLAZONE 5 MG: 5 TABLET ORAL at 06:11

## 2022-01-01 RX ADMIN — ASPIRIN 300 MG: 300 SUPPOSITORY RECTAL at 11:11

## 2022-01-01 RX ADMIN — IOPAMIDOL 100 ML: 755 INJECTION, SOLUTION INTRAVENOUS at 08:11

## 2022-01-01 RX ADMIN — MAGNESIUM SULFATE HEPTAHYDRATE 0.5 G/HR: 40 INJECTION, SOLUTION INTRAVENOUS at 10:11

## 2022-01-01 RX ADMIN — OSELTAMIVIR PHOSPHATE 75 MG: 75 CAPSULE ORAL at 11:11

## 2022-01-01 RX ADMIN — DIGOXIN 250 MCG: 0.25 INJECTION INTRAMUSCULAR; INTRAVENOUS at 10:11

## 2022-01-01 RX ADMIN — DEXAMETHASONE SODIUM PHOSPHATE 4 MG: 4 INJECTION, SOLUTION INTRA-ARTICULAR; INTRALESIONAL; INTRAMUSCULAR; INTRAVENOUS; SOFT TISSUE at 10:11

## 2022-01-01 RX ADMIN — ATORVASTATIN CALCIUM 80 MG: 80 TABLET, FILM COATED ORAL at 08:12

## 2022-01-01 RX ADMIN — AMIODARONE HYDROCHLORIDE 0.5 MG/MIN: 50 INJECTION, SOLUTION INTRAVENOUS at 05:11

## 2022-01-01 RX ADMIN — LORAZEPAM 1 MG: 2 INJECTION INTRAMUSCULAR; INTRAVENOUS at 02:12

## 2022-01-01 RX ADMIN — PHENYLEPHRINE HYDROCHLORIDE 200 MCG: 10 INJECTION INTRAVENOUS at 09:08

## 2022-01-01 RX ADMIN — IPRATROPIUM BROMIDE 0.5 MG: 0.5 SOLUTION RESPIRATORY (INHALATION) at 11:11

## 2022-03-09 NOTE — PROGRESS NOTES
MDM/time:  Greater than 45 minutes spent on this encounter including 15 minutes reviewing imaging and notes, 20 minutes with the patient, 10 minutes documentation    ASSESSMENT:  66 y.o. male with cervical spondylosis with myeloradiculopathy     PLAN:  Offered Surgery C3-7 anterior and C3-7 posterior fusion (stages procedure)   Nicotine dependence - discussed smoking cessation   Patient will thing about surgery - he will call back if he would like to proceed  Full medical and cardiac clearance - will be needed   We have urged patient to see Dr. Weiss -PCP for weight loss and early satiety  Follow up 3 months      HPI:  66 y.o. male here for evaluation of left-sided neck pain that radiates down the left arm and hand numbness and tingling in bilateral hands.  Patient reports he passed out November 30th hip his head on the right side and since that time he has had pain on the left side of his neck radiating down to his left arm numbness in his hands.  Patient reports decreased  strength in his hands left hand is worse than right.  Reports difficulty with balance has had multiple falls.  Denies bladder bowel incontinence.  Difficulty walking distances due to back pain and weakness in his legs.  He currently uses Norco and gabapentin 300 mg at bedtime as needed for pain.  No recent physical therapy.  Patient has been seen by Dr. Patel and given medications for pain no injections have been given at this time.  Recent MRI 01/20/2022.  Patient reports he has had prior lumbar spine surgery at L4-5 fused with a Dr. Morton in 1997 for left leg pain.  Patient currently sees a cardiologist at [facility] he is unsure of the name of the doctor but takes Eliquis twice a day for irregular heartbeat.  He reports he smokes a pack of cigarettes every 2 days and has done this for the past 50 years.  Patient also reports a weight loss of 10-20 lb over the past 3 months weight loss with early satiety.      IMAGING:  3/9/2022 cervical  spine xray reviewed showed:   On the AP there is normal coronal alignment.  There is uncovertebral and facet hypertrophy seen.  On the lateral there is loss of cervical lordosis.  There are spondylotic changes noted with decrease in disc height and osteophyte formation seen.  Grade 1 anterolisthesis at C3-4.  No instability on flexion-extension views.    1/20/2022 MRI cervical spine reviewed showed:   C3-4 moderate central, severe left greater than right foraminal stenosis  C4-5 severe central/bilateral Foraminal stenosis  C5-6 severe central/bilateral foraminal stenosis  C6-7 moderate central/moderate bilateral foraminal stenosis    Past Medical History:   Diagnosis Date    Colitis     High cholesterol     Hypertension      Past Surgical History:   Procedure Laterality Date    BACK SURGERY      COLONOSCOPY N/A 5/22/2018    Procedure: COLONOSCOPY;  Surgeon: Sudhakar Herrera MD;  Location: [facility];  Service: Endoscopy;  Laterality: N/A;    WRIST SURGERY       Social History     Tobacco Use    Smoking status: Current Every Day Smoker     Packs/day: 1.00     Years: 40.00     Pack years: 40.00     Types: Cigarettes    Smokeless tobacco: Never Used   Substance Use Topics    Alcohol use: Yes     Alcohol/week: 1.0 standard drink     Types: 1 Shots of liquor per week     Comment: Occ    Drug use: Yes     Types: Cocaine     Comment: 2wees ago      Current Outpatient Medications   Medication Instructions    albuterol 90 mcg/actuation inhaler 2 puffs, Inhalation, Every 6 hours PRN, Rescue    amLODIPine (NORVASC) 10 mg, Oral, Daily    clotrimazole-betamethasone 1-0.05% (LOTRISONE) cream Topical (Top), 2 times daily    DULoxetine (CYMBALTA) 60 mg, Oral, Daily    ergocalciferol (ERGOCALCIFEROL) 50,000 Units, Oral, Every 7 days, Take once weekly for 12 weeks.    lovastatin (MEVACOR) 40 mg, Oral, Nightly    RABEprazole (ACIPHEX) 20 mg, Oral, Daily    sildenafil (VIAGRA) 100 MG tablet Take 1 tablet (100 mg  total) by mouth daily as needed for Erectile Dysfunction.    tiotropium (SPIRIVA WITH HANDIHALER) 18 mcg, Inhalation, Daily, Controller        EXAM:  Constitutional  General Appearance:  Underweight, NAD  Psychiatric   Orientation: Oriented to time, oriented to place, oriented to person  Mood and Affect: Active and alert, normal mood, normal affect  Gait and Station   Appearance:  Antalgic gait to the right, unable to tandem gait, unable to walk on toes, unable to walk on heels    CERVICAL  Musculoskeletal System  Shoulder:  normal appearance, no instability, no tenderness, normal ROM right, decreased ROM left, no pain with ROM.      Cervical Spine  Inspection:  alignment normal, no muscle atrophy  Soft Tissue Palpation on the Right:  no tenderness of the paracervicals, no tenderness of the trapezius, no tenderness of the rhomboid  Soft Tissue Palpation on the Left:  no tenderness of the paracervicals, no tenderness of the trapezius, no tenderness of the rhomboid  Bony Palpation:  no tenderness of the occipital protuberance  Active Range:     Flexion decreased, Extension decreased, Rotation to the left normal, Rotation to the right normal, Lateral flexion to the left normal, Lateral flexion to the right normal   No pain elicited on motion    Motor Strength  C5 on the right:  abduction deltoid 3/5  C5 on the left:  abduction deltoid 4/5  C6 on the Right:  flexion biceps 4/5, wrist extension 4/5  C6 on the Left:  flexion biceps 4/5, wrist extension 4/5  C7 on the Right:  extension fingers 5/5, extension triceps 5/5  C7 on the Left:  extension fingers 5/5, extension triceps 5/5  C8 on the Right:  flexion fingers 5/5  C8 on the Left:  flexion fingers 5/5  T1 on the Right:  abduction fingers 5/5  T1 on the Left:  abduction fingers 5/5    Neurological System  Sensation on the Right:  normal sensation of the extremities: right, normal median nerve distribution, normal ulnar nerve distribution  Sensation on the Left:   normal sensation of the extremities: left, normal median nerve distribution, normal ulnar nerve distribution  Biceps Reflex Right:  normal, Brachioradialis Reflex Right:  normal, Triceps Reflex Right: normal  Biceps Reflex Left:  normal, Brachioradialis Reflex Left: normal, Triceps Reflex Left:  normal  Special Test on the Right:  Spurlings test negative, Hoffmans reflex absent, no ankle clonus, Durkan test negative, Tinels sign negative at the elbow  Special Test on the Left:  Spurlings test negative, Hoffmans reflex absent, no ankle clonus, Durkan test negative, Tinels sign negative at the elbow    Skin:   Head and Neck:  normal   Right Upper Extremity:  normal   Left Upper Extremity:  normal    Cardiovascular:   Arterial Pulses Right:  radial right   Arterial Pulses Left:  radial left   Edema Right:  none   Edema Left:  None    LUMBAR  Musculoskeletal System   Hips: Normal appearance, no leg length discrepancy, normal motion; left, normal motion; right    Lumbar Spine                   Inspection:  Normal alignment, normal sagittal balance                  Range of motion:  Decreased flexion, extension, lateral bending, rotation. Pain with range of motion                  Bony Palpation of the Lumbar Spine:  No tenderness of the spinous process, no tenderness of the sacrum, no tenderness of the coccyx                  Bony Palpation of the Right Hip:  No tenderness of the iliac crest, no tenderness of the sciatic notch, no tenderness of the SI joint                  Bony Palpation of the Left Hip:  No tenderness of the iliac crest, no tenderness of the sciatic notch, no tenderness of the SI joint                  Soft Tissue Palpation on the Right:  No tenderness of the paraspinal region, no tenderness of the iliolumbar region                  Soft Tissue Palpation on the Left:  No tenderness of the paraspinal region, no tenderness of the iliolumbar region    Motor Strength   L1 Right:  Hip flexion iliopsoas 5/5     L1 Left:  Hip flexion iliopsoas 5/5              L2-L4 Right:  Knee extension quadriceps 5/5, tibialis anterior 5/5              L2-L4 Left:  Knee extension quadriceps 5/5, tibialis anterior 5/5   L5 Right:  Extensor hallucis llongus 4/5,    L5 Left:  Extensor hallucis longus 5/5,    S1 Right:  Plantar flexion gastrocnemius 4/5   S1 Left:  Plantar flexion gastrocnemius 5/5    Neurological System   Ankle Reflex Right:  normal   Ankle Reflex Left: normal   Knee Reflex Right:  normal   Knee Reflex Left:  normal   Sensation on the Right:  L2 normal, L3 normal, L4 normal, L5 normal, S1 normal   Sensation on the Left:  L2 normal, L3 normal, L4 normal, L5 normal, S1 normal              Special Test on the Right:  Seated straight leg raising test negative, no clonus of the ankle              Special Test on the Left:  Seated straight leg raising test negative, no clonus of the ankle    Skin   Lumbosacral Spine:  Normal skin    Cardiovascular System   Arterial Pulses Right:  Posterior tibialis normal, dorsalis pedis normal   Arterial Pulses Left:  Posterior tibialis normal, dorsalis pedis normal   Edema Right: None   Edema Left:  None

## 2022-03-09 NOTE — PROGRESS NOTES
Smoking Cessation counseling    Time spent:  3-10 minutes (51099)    We discussed the importance, over both the short and long-term, of smoking cessation; reviewed the patient's willingness to quit; overall history and current use of tobacco smoking products; that there are a variety of methods to help with smoking diminution and cessation (stopping alone, using nicotine substitution medications/gums, Chantix, other medications, etcetera, which the patient will also discuss with his or her primary care physician); that the patient should set a date for smoking cessation; and the patient will follow up with his or her primary care physician for further support and oversight.    We also discussed that for the patient to have surgery while using nicotine, wound healing may be compromised relative to those who do not smoke; that recovery from anesthesia may sometimes be more difficult; and that quitting may decrease the heart, vascular, pulmonary effects in the short term as well as over the long term.  Smoking cessation may be useful and important for any patient who will have a fusion as part of surgery or have bone or tissue that has regrown heal (bone fusions, wound closures, etc.)  since surgical results with respect to wound healing, susceptibility to recovery from infection, bone fusion, and tissue and blood vessel health may be impaired or less optimal in smokers than nonsmokers.  We talked about the elevated risk of surgical bone fusion failure in the setting of smoking and the potential need for a higher-risk revision surgery should fusion not occur.    I reviewed this with the patient in detail and all questions were answered.  We discussed nature of the patient's condition; real alternatives and realistic options; pros and cons, risks and benefits of all the options, in detail.  I believe the patient understands the above and wishes to proceed as outlined.

## 2022-03-09 NOTE — PROGRESS NOTES
AP, lateral, flexion/extension views of the cervical spine reviewed    On the AP there is normal coronal alignment.  There is uncovertebral and facet hypertrophy seen.  On the lateral there is loss of cervical lordosis.  There are spondylotic changes noted with decrease in disc height and osteophyte formation seen.  Grade 1 anterolisthesis at C3-4.  No instability on flexion-extension views.    Impression:  Degenerative changes of cervical spine as noted above

## 2022-03-09 NOTE — LETTER
March 11, 2022    Joao Alon King  [address] [facility] [place] [address]         [facility]  [address] [place] [address] [place] [address]  Phone: [phone]  Fax: [phone] March 11, 2022     Patient: Joao Chi Sr   YOB: 1955   Date of Visit: 3/9/2022       To Whom It May Concern:    It is my medical opinion that Joao Chi  {Work release (duty restriction):81140}.    If you have any questions or concerns, please don't hesitate to call.    Sincerely,        Andre Inman MD

## 2022-04-07 PROBLEM — K59.00 CONSTIPATION: Status: ACTIVE | Noted: 2022-01-01

## 2022-04-07 PROBLEM — R10.11 RIGHT UPPER QUADRANT ABDOMINAL PAIN: Status: ACTIVE | Noted: 2022-01-01

## 2022-04-07 PROBLEM — K92.1 BLACK STOOLS: Status: ACTIVE | Noted: 2022-01-01

## 2022-04-07 PROBLEM — M25.422 ELBOW SWELLING, LEFT: Status: ACTIVE | Noted: 2022-01-01

## 2022-04-07 NOTE — PROGRESS NOTES
Subjective:       Patient ID: Joao Chi Sr is a 66 y.o. male.    Chief Complaint: Recurrent Skin Infections (Swollen elbow x 5 days. Possible infection.)    Patient reports to clinic with complaints of right elbow swelling. Patient reports black tarry stools and abdominal pain. Patient reports drinking a pint of liquor a day. Takes     Review of Systems   Constitutional: Negative for appetite change, fatigue and fever.   HENT: Negative for nasal congestion, ear pain and sore throat.    Eyes: Negative for pain, discharge and itching.   Respiratory: Negative for cough and shortness of breath.    Cardiovascular: Negative for chest pain and leg swelling.   Gastrointestinal: Positive for abdominal pain. Negative for abdominal distention, change in bowel habit, constipation, diarrhea, nausea, rectal pain, vomiting and change in bowel habit.        Black stools   Musculoskeletal: Positive for joint swelling. Negative for back pain, gait problem and neck pain.   Integumentary:  Negative for rash and wound.   Neurological: Negative for dizziness, weakness and headaches.   All other systems reviewed and are negative.        Objective:      Physical Exam  Vitals and nursing note reviewed.   Constitutional:       General: He is not in acute distress.     Appearance: Normal appearance. He is not ill-appearing, toxic-appearing or diaphoretic.   HENT:      Head: Normocephalic.      Right Ear: Tympanic membrane, ear canal and external ear normal.      Left Ear: Tympanic membrane, ear canal and external ear normal.      Nose: Nose normal. No congestion or rhinorrhea.      Mouth/Throat:      Mouth: Mucous membranes are moist.      Pharynx: No oropharyngeal exudate or posterior oropharyngeal erythema.   Eyes:      General: No scleral icterus.        Right eye: No discharge.         Left eye: No discharge.      Extraocular Movements: Extraocular movements intact.      Conjunctiva/sclera: Conjunctivae normal.      Pupils: Pupils  are equal, round, and reactive to light.   Cardiovascular:      Rate and Rhythm: Normal rate and regular rhythm.      Pulses: Normal pulses.      Heart sounds: Normal heart sounds. No murmur heard.  Pulmonary:      Effort: Pulmonary effort is normal. No respiratory distress.      Breath sounds: Normal breath sounds. No wheezing, rhonchi or rales.   Abdominal:      General: Bowel sounds are normal.      Palpations: Abdomen is soft.      Tenderness: There is abdominal tenderness in the right upper quadrant and epigastric area. There is no guarding or rebound.      Comments: Occult blood negative   Musculoskeletal:         General: Swelling present. No tenderness. Normal range of motion.      Left elbow: Swelling and effusion present. Normal range of motion. No tenderness.      Cervical back: Neck supple. No tenderness.      Comments: No redness, warmth or tenderness noted to the left elbow region   Lymphadenopathy:      Cervical: No cervical adenopathy.   Skin:     General: Skin is warm and dry.      Capillary Refill: Capillary refill takes less than 2 seconds.      Coloration: Skin is not jaundiced or pale.      Findings: No bruising, erythema, lesion or rash.   Neurological:      Mental Status: He is alert and oriented to person, place, and time.   Psychiatric:         Mood and Affect: Mood normal.         Behavior: Behavior normal.         Thought Content: Thought content normal.         Judgment: Judgment normal.            Assessment:       1. Right upper quadrant abdominal pain    2. Black stools    3. Elbow swelling, left    4. Constipation, unspecified constipation type    5. Elbow effusion, left        Plan:   Right upper quadrant abdominal pain  -     CBC Auto Differential; Future; Expected date: 04/07/2022  -     Comprehensive Metabolic Panel; Future; Expected date: 04/07/2022  -     X-Ray KUB; Future; Expected date: 04/07/2022  -     US Abdomen Limited; Future; Expected date: 04/07/2022    Black stools  -      Occult blood x 1, stool; Future; Expected date: 04/07/2022    Elbow swelling, left  -     sulfamethoxazole-trimethoprim 800-160mg (BACTRIM DS) 800-160 mg Tab; Take 1 tablet by mouth 2 (two) times daily. for 7 days  Dispense: 14 tablet; Refill: 0  -     X-Ray Elbow 2 Views Left; Future; Expected date: 04/07/2022    Constipation, unspecified constipation type    Elbow effusion, left  -     Ambulatory referral/consult to Orthopedics; Future; Expected date: 04/14/2022       Dulcolax OTC today with a liquid diet  No alcohol  NPO after midnight for abdominal US tomorrow  Refer to ortho for left elbow effusion

## 2022-04-11 NOTE — TELEPHONE ENCOUNTER
Identify patient by name and . Results was given. Responded well and voiced understanding.----- Message from GOMEZ Lomeli sent at 2022  7:47 AM CDT -----  Please notify pt his US is WNL

## 2022-04-21 NOTE — ED PROVIDER NOTES
Encounter Date: 4/21/2022       History     Chief Complaint   Patient presents with    Shortness of Breath     Mr. Esteban is a 66 year old male who is presenting to the ED complaining of shortness of breath that started at noon today.  Patient reports that he have a history of COPD and he uses albuterol nebulizer at home.  Patient notes that he used his nebulizer before coming to the ED with no improvement.  Patient reports that he had increased sputum production in the last 2 days.  Patient denies fever, denies chest pain, denies abdominal pain, denies nausea vomiting or diarrhea.        Review of patient's allergies indicates:  No Known Allergies  Past Medical History:   Diagnosis Date    Colitis     High cholesterol     Hypertension      Past Surgical History:   Procedure Laterality Date    BACK SURGERY      COLONOSCOPY N/A 5/22/2018    Procedure: COLONOSCOPY;  Surgeon: Sudhakar Herrera MD;  [facility]: [facility];  Service: Endoscopy;  Laterality: N/A;    WRIST SURGERY       Family History   Problem Relation Age of Onset    Heart failure Mother     No Known Problems Father     No Known Problems Sister     No Known Problems Brother     No Known Problems Brother      Social History     Tobacco Use    Smoking status: Current Every Day Smoker     Packs/day: 1.00     Years: 40.00     Pack years: 40.00     Types: Cigarettes    Smokeless tobacco: Never Used   Substance Use Topics    Alcohol use: Yes     Alcohol/week: 1.0 standard drink     Types: 1 Shots of liquor per week     Comment: Occ    Drug use: Yes     Types: Cocaine     Comment: 2wees ago     Review of Systems   Constitutional: Negative for fever.   HENT: Negative for sore throat.    Respiratory: Negative for shortness of breath.    Cardiovascular: Negative for chest pain.   Gastrointestinal: Negative for nausea.   Genitourinary: Negative for dysuria.   Musculoskeletal: Negative for back pain.   Skin: Negative for rash.   Neurological:  Negative for weakness.   Hematological: Does not bruise/bleed easily.       Physical Exam     Initial Vitals [04/21/22 1801]   BP Pulse Resp Temp SpO2   (!) 141/103 94 (!) 34 98.3 °F (36.8 °C) 95 %      MAP       --         Physical Exam    Constitutional: He appears well-developed and well-nourished.   HENT:   Head: Normocephalic.   Eyes: Pupils are equal, round, and reactive to light.   Neck:   Normal range of motion.  Cardiovascular: Normal rate, regular rhythm and normal heart sounds.   Pulmonary/Chest: No respiratory distress. He has wheezes. He has no rhonchi. He has no rales. He exhibits no tenderness.   Abdominal: Abdomen is soft. Bowel sounds are normal. He exhibits no distension.   Musculoskeletal:         General: Normal range of motion.      Cervical back: Normal range of motion.     Neurological: He is alert and oriented to person, place, and time. GCS score is 15. GCS eye subscore is 4. GCS verbal subscore is 5. GCS motor subscore is 6.   Skin: Capillary refill takes less than 2 seconds.   Psychiatric: He has a normal mood and affect.         Medical Screening Exam   See Full Note    ED Course   Procedures  Labs Reviewed   COMPREHENSIVE METABOLIC PANEL - Abnormal; Notable for the following components:       Result Value    BUN 19 (*)     All other components within normal limits   CBC WITH DIFFERENTIAL - Abnormal; Notable for the following components:    Monocytes % 9.3 (*)     Basophils % 1.1 (*)     All other components within normal limits   URINALYSIS, REFLEX TO URINE CULTURE - Normal   MAGNESIUM - Normal   CBC W/ AUTO DIFFERENTIAL    Narrative:     The following orders were created for panel order CBC auto differential.  Procedure                               Abnormality         Status                     ---------                               -----------         ------                     CBC with Differential[[id number]]        Abnormal            Final result                 Please view  results for these tests on the individual orders.          Imaging Results          X-Ray Chest AP Portable (Final result)  Result time 04/21/22 19:24:28    Final result by Jerome Day II, MD (04/21/22 19:24:28)                 Impression:      No evidence of acute cardiopulmonary disease.      Electronically signed by: Jerome Day  Date:    04/21/2022  Time:    19:24             Narrative:    EXAMINATION:  XR CHEST AP PORTABLE    CLINICAL HISTORY:  SOB;    COMPARISON:  18 June 2019    FINDINGS:  The heart and mediastinum are normal in size and configuration.  The pulmonary vascularity is normal in caliber.  No lung infiltrates, effusions, pneumothorax or other abnormality is demonstrated.                                 Medications   albuterol-ipratropium 2.5 mg-0.5 mg/3 mL nebulizer solution 3 mL (3 mLs Nebulization Given 4/21/22 1844)   methylPREDNISolone sodium succinate injection 80 mg (80 mg Intravenous Given 4/21/22 1829)     Medical Decision Making:   Clinical Tests:   Lab Tests: Ordered and Reviewed  Radiological Study: Ordered and Reviewed  ED Management:  Patient reports marked improvement after steroids and breathing treatmnet. Troponin negative and EKG shows no acute abnormality                   Clinical Impression:   Final diagnoses:  [R06.02] SOB (shortness of breath)          ED Disposition Condition    Discharge Stable        ED Prescriptions     Medication Sig Dispense Start Date End Date Auth. Provider    predniSONE (DELTASONE) 20 MG tablet Take 2 tablets (40 mg total) by mouth once daily. for 5 days 10 tablet 4/21/2022 4/26/2022 Radha Link MD    albuterol sulfate 2.5 mg/0.5 mL Nebu Take 2.5 mg by nebulization every 4 (four) hours as needed (Wheezing/SOB). Rescue 90 each 4/21/2022 5/21/2022 Radha Link MD    albuterol (PROVENTIL/VENTOLIN HFA) 90 mcg/actuation inhaler Inhale 2 puffs into the lungs every 6 (six) hours as needed for Wheezing. Rescue 18 g 4/21/2022  4/21/2023 Radha Link MD        Follow-up Information     Follow up With Specialties Details Why Contact Info    Cornelia Weiss MD Family Medicine   [address] [facility] [address]  [facility] [address]  [phone]             Radha Link MD  04/21/22 210

## 2022-05-16 PROBLEM — M70.22 OLECRANON BURSITIS OF LEFT ELBOW: Status: ACTIVE | Noted: 2022-01-01

## 2022-06-04 NOTE — PROGRESS NOTES
Subjective:       Patient ID: Joao Chi Sr is a 66 y.o. male.    Chief Complaint: Neck Pain (Glands swelling in neck)    Neck swelling began today.  Sore throat for several days.  No fever he is a smoker.    Review of Systems      Objective:      Physical Exam  Constitutional:       General: He is not in acute distress.     Appearance: Normal appearance. He is not ill-appearing.   HENT:      Right Ear: Tympanic membrane normal.      Left Ear: Tympanic membrane normal.      Nose: Congestion and rhinorrhea present.      Mouth/Throat:      Pharynx: Posterior oropharyngeal erythema (Very mild) present.   Cardiovascular:      Rate and Rhythm: Normal rate and regular rhythm.   Pulmonary:      Effort: Pulmonary effort is normal.      Breath sounds: Normal breath sounds.   Musculoskeletal:      Cervical back: Tenderness ( well localized tenderness just posterior to left side of larynx) present.   Lymphadenopathy:      Cervical: No cervical adenopathy.   Neurological:      Mental Status: He is alert.         Assessment:       Problem List Items Addressed This Visit    None     Visit Diagnoses     Sore throat    -  Primary          Plan:         if this does not resolve with treatment he will call for ENT referral

## 2022-06-23 NOTE — PROGRESS NOTES
Subjective:       Patient ID: Joao Chi Sr is a 66 y.o. male.    Chief Complaint: Sore Throat and Neck Swelling (Reports intermittent swelling with eating. Reports taking multiple rounds of antibiotics)    HPI  Review of Systems   HENT: Positive for facial swelling, mouth sores and sore throat.    All other systems reviewed and are negative.      Objective:      Physical Exam  General: NAD  Head: Normocephalic, atraumatic, no facial asymmetry/normal strength,  Ears: Both auricules normal in appearance, w/o deformities tympanic membranes normal external auditory canals normal  Nose: External nose w/o deformities normal turbinates no drainage or inflammation  Oral Cavity: Lips, gums, tender left  floor of mouth, tongue hard palate, and buccal mucosa without mass/lesion  Oropharynx: Mucosa pink and moist, soft palate, posterior pharynx and oropharyngeal wall without mass/lesion  Neck: Supple, symmetric, trachea midline, no palpable mass/lesion, no palpable cervical lymphadenopathy  Skin: Warm and dry, no concerning lesions  Respiratory: Respirations even, unlabored    Assessment:       1. Submandibular duct obstruction    2. Localized enlarged lymph nodes        Plan:       Possible stone left gland will treat with ciproand Get CT scan of neck

## 2022-07-07 NOTE — PROGRESS NOTES
Subjective:       Patient ID: Joao Chi Sr is a 66 y.o. male.    Chief Complaint: Follow-up (Patient is here for CT sinus results.)    HPI  Review of Systems   HENT: Positive for sore throat.    All other systems reviewed and are negative.      Objective:      Physical Exam  General: NAD  Head: Normocephalic, atraumatic, no facial asymmetry/normal strength,  Ears: Both auricules normal in appearance, w/o deformities tympanic membranes normal external auditory canals normal  Nose: External nose w/o deformities normal turbinates no drainage or inflammation  Oral Cavity: Lips, gums, floor of mouth, tongue hard palate, and buccal mucosa without mass/lesion  Oropharynx: Mucosa pink and moist, soft palate, posterior pharynx and oropharyngeal wall without mass/lesion  Neck: Supple, symmetric, trachea midline, no palpable mass/lesion, no palpable cervical lymphadenopathy  Skin: Warm and dry, no concerning lesions  Respiratory: Respirations even, unlabored    Assessment:       1. Submandibular duct obstruction        Plan:       CT reviewed in detail no stone seen   Explained in detail hydration should help

## 2022-07-27 PROBLEM — Z11.52 ENCOUNTER FOR SCREENING FOR COVID-19: Status: ACTIVE | Noted: 2018-05-22

## 2022-07-27 NOTE — PROGRESS NOTES
Subjective:       Patient ID: Joao Chi Sr is a 66 y.o. male.    Chief Complaint: No chief complaint on file.    Covid test- possible exposure-no symptoms  Left elbow pain and swelling    Review of Systems   Constitutional: Negative for appetite change, fatigue and fever.   HENT: Negative for nasal congestion, ear pain and sore throat.    Eyes: Negative for pain, discharge and itching.   Respiratory: Negative for cough and shortness of breath.    Cardiovascular: Negative for chest pain and leg swelling.   Gastrointestinal: Negative for abdominal pain, change in bowel habit, nausea, vomiting and change in bowel habit.   Musculoskeletal: Negative for back pain, gait problem and neck pain. Joint swelling: left elbow.   Integumentary:  Negative for rash and wound.   Neurological: Negative for dizziness, weakness and headaches.   All other systems reviewed and are negative.        Objective:      Physical Exam  Vitals and nursing note reviewed.   Constitutional:       General: He is not in acute distress.     Appearance: Normal appearance. He is not ill-appearing, toxic-appearing or diaphoretic.   HENT:      Head: Normocephalic.      Right Ear: Tympanic membrane, ear canal and external ear normal.      Left Ear: Tympanic membrane, ear canal and external ear normal.      Nose: Nose normal. No congestion or rhinorrhea.      Mouth/Throat:      Mouth: Mucous membranes are moist.      Pharynx: No oropharyngeal exudate or posterior oropharyngeal erythema.   Eyes:      General: No scleral icterus.        Right eye: No discharge.         Left eye: No discharge.      Extraocular Movements: Extraocular movements intact.      Conjunctiva/sclera: Conjunctivae normal.      Pupils: Pupils are equal, round, and reactive to light.   Cardiovascular:      Rate and Rhythm: Normal rate and regular rhythm.      Pulses: Normal pulses.      Heart sounds: Normal heart sounds. No murmur heard.  Pulmonary:      Effort: Pulmonary effort is  normal. No respiratory distress.      Breath sounds: Normal breath sounds. No wheezing, rhonchi or rales.   Musculoskeletal:         General: Swelling and tenderness present. Normal range of motion.      Left elbow: Swelling and effusion present. Normal range of motion. Tenderness present.      Cervical back: Neck supple. No tenderness.   Lymphadenopathy:      Cervical: No cervical adenopathy.   Skin:     General: Skin is warm and dry.      Capillary Refill: Capillary refill takes less than 2 seconds.      Findings: No rash.   Neurological:      Mental Status: He is alert and oriented to person, place, and time.   Psychiatric:         Mood and Affect: Mood normal.         Behavior: Behavior normal.         Thought Content: Thought content normal.         Judgment: Judgment normal.            Assessment:       1. Encounter for screening for COVID-19    2. Olecranon bursitis of left elbow        Plan:   Encounter for screening for COVID-19  -     SARS Coronavirus 2 Antigen, POCT    Olecranon bursitis of left elbow  -     Ambulatory referral/consult to Orthopedics; Future; Expected date: 08/03/2022

## 2022-08-11 PROBLEM — M70.22 OLECRANON BURSITIS OF LEFT ELBOW: Chronic | Status: ACTIVE | Noted: 2022-01-01

## 2022-08-11 NOTE — H&P (VIEW-ONLY)
[facility] - Orthopedic Periop Services  Orthopedics  H&P    Patient Name: Joao Chi Sr  MRN: [id number]  Admission Date: (Not on file)  Primary Care Provider: Cornelia Weiss MD    Patient information was obtained from patient and ER records.     Subjective:     Principal Problem:Olecranon bursitis of left elbow    Chief Complaint: No chief complaint on file.       HPI:   Chief complaint :  painful mass-left elbow olecranon bursal region  History:   Joao Chi Sr is a 66 y.o. male seen  for for evaluation of left elbow pain and swelling.  He has atraumatic developed olecranon bursitis.  He admits to propping on his elbow frequently.  There is no history of significant trauma however.  He is right-hand dominant.  He has had aspiration of the olecranon bursa on 2 occasions.  An aspiration in May performed by Dr. Obrien and a subsequent aspiration 07/28/2022 performed by [facility].    X-rays left elbow 05/16/2022 showed radiocapitellar and ulnohumeral joints to be located.  Small traction spur emanated from the olecranon process.  Impression:  Olecranon bursitis-left elbow  Plan:  Excision olecranon bursa-left elbow.  The potential benefits and risks of surgery outlined to include but not limited to bleeding, infection, damage to blood vessels and nerves can further surgery, other risks and complications including even death the patient wished to proceed.              No new subjective & objective note has been filed under this hospital service since the last note was generated.    Assessment/Plan:     No notes have been filed under this hospital service.  Service: Orthopedic Surgery      Santiago Roman MD  Orthopedics  [facility] Orthopedic Periop Services

## 2022-08-18 NOTE — DISCHARGE SUMMARY
[facility] - Orthopedic Periop Services  Discharge Note  Short Stay    Procedure(s) (LRB):  BURSECTOMY, OLECRANON (Left)    OUTCOME: Patient tolerated treatment/procedure well without complication and is now ready for discharge.    DISPOSITION: Home or Self Care    FINAL DIAGNOSIS:  Olecranon bursitis of left elbow    FOLLOWUP: In clinic    DISCHARGE INSTRUCTIONS:    Discharge Procedure Orders   Diet general     Keep surgical extremity elevated     Ice to affected area   Order Comments: using barrier between ice and skin (specify duration&frequency)     Call MD for:  temperature >100.4     Call MD for:  persistent nausea and vomiting     Call MD for:  severe uncontrolled pain     Call MD for:  difficulty breathing, headache or visual disturbances     Call MD for:  redness, tenderness, or signs of infection (pain, swelling, redness, odor or green/yellow discharge around incision site)     Call MD for:  hives     Call MD for:  persistent dizziness or light-headedness     Call MD for:  extreme fatigue     Leave dressing on - Keep it clean, dry, and intact until clinic visit     Activity as tolerated     Shower on day dressing removed (No bath)     Weight bearing as tolerated        TIME SPENT ON DISCHARGE: 15 minutes

## 2022-08-18 NOTE — OP NOTE
[facility] - Orthopedic Periop Services  Surgery Department  Operative Note    SUMMARY     Date of Procedure: 8/18/2022     Procedure: Procedure(s) (LRB):  BURSECTOMY, OLECRANON (Left)     Surgeon(s) and Role:     * Santiago Roman MD - Primary    Assisting Surgeon: None    Pre-Operative Diagnosis: Olecranon bursitis of left elbow [M70.22]    Post-Operative Diagnosis: Post-Op Diagnosis Codes:     * Olecranon bursitis of left elbow [M70.22]    Anesthesia: General    Technical Procedures Used:  The patient was taken to the operating room and placed in the supine position.  After adequate level of general anesthesia been achieved (see anesthesia note) the left upper extremity was prepped with Betadine and draped sterile fashion.  Left upper extremity was elevated and exsanguinated with an elastic bandage.  A pneumatic tourniquet in place about the left upper arm was inflated to pressure of 250 mmHg.  The operation was begun by making a linear skin incision over the posterior aspect of the left elbow.  Incision was carried carefully through subcutaneous layers.  Skin flaps were developed.  Olecranon bursa was identified and skeletonized.  The bursa was removed and submitted for pathologic evaluation.  Tourniquet was let down hemostasis was achieved with Bovie electrocautery.  Dead space was obliterated swelling the dermis to the periosteum of the olecranon.  Subcutaneous layer was approximated with interrupted 2-0 Vicryl suture.  Skin margins approximated with stainless steel staples.  Wounds dressed sterilely and a posterior plaster splint was applied the elbow flexed 90°.  Patient was awakened and covered room in satisfactory condition.  Estimated blood loss minimal.  Tourniquet time 24 minutes.  Patient Ancef antibiotic intravenously prior to procedure          Complications: No    Estimated Blood Loss (EBL): * No values recorded between 8/18/2022  9:10 AM and 8/18/2022  9:58 AM *           Implants: * No implants  in log *    Specimens:   Specimen (24h ago, onward)             Start     Ordered    08/18/22 0938  Surgical Pathology  Once        Question Answer Comment   Number of specimens 1    Source(s): Elbow, Left    Clinical History: LEFT ELBOW BURSA        08/18/22 0937                        Condition: Good    Disposition: PACU - hemodynamically stable.    Attestation: I was present and scrubbed for the entire procedure.

## 2022-08-18 NOTE — ANESTHESIA POSTPROCEDURE EVALUATION
Anesthesia Post Evaluation    Patient: Joao Chi Sr    Procedure(s) Performed: Procedure(s) (LRB):  BURSECTOMY, OLECRANON (Left)    Final Anesthesia Type: general      Patient location during evaluation: PACU  Patient participation: Yes- Able to Participate  Level of consciousness: awake and sedated  Post-procedure vital signs: reviewed and stable  Pain management: adequate  Airway patency: patent    PONV status at discharge: No PONV  Anesthetic complications: no      Cardiovascular status: blood pressure returned to baseline  Respiratory status: unassisted  Hydration status: euvolemic  Follow-up not needed.          Vitals Value Taken Time   /95 08/18/22 1201   Temp 36.3 °C (97.4 °F) 08/18/22 1002   Pulse 126 08/18/22 1201   Resp 16 08/18/22 1200   SpO2 100 % 08/18/22 1055         Event Time   Out of Recovery 10:43:00         Pain/Chirsty Score: Pain Rating Prior to Med Admin: 8 (8/18/2022 10:56 AM)  Pain Rating Post Med Admin: 8 (8/18/2022 10:43 AM)  Christy Score: 8 (8/18/2022 10:15 AM)

## 2022-08-18 NOTE — TRANSFER OF CARE
Anesthesia Transfer of Care Note    Patient: Joao Chi Sr    Procedure(s) Performed: Procedure(s) (LRB):  BURSECTOMY, OLECRANON (Left)    Patient location: PACU    Anesthesia Type: general    Transport from OR: Transported from OR on room air with adequate spontaneous ventilation    Post pain: adequate analgesia    Post assessment: no apparent anesthetic complications and tolerated procedure well    Post vital signs: stable    Level of consciousness: responds to stimulation    Nausea/Vomiting: no nausea/vomiting    Complications: none    Transfer of care protocol was followedComments: Report Given to PACU rn VSS      Last vitals:   Visit Vitals  /61 (BP Location: Right arm, Patient Position: Lying)   Pulse 68   Temp 36.3 °C (97.4 °F) (Oral)   Resp 15   Wt 54.4 kg (120 lb)   SpO2 98%   BMI 17.72 kg/m²

## 2022-08-18 NOTE — ANESTHESIA PREPROCEDURE EVALUATION
08/18/2022  Joao Chi Sr is a 66 y.o., male.      Pre-op Assessment    I have reviewed the Patient Summary Reports.     I have reviewed the Nursing Notes. I have reviewed the NPO Status.   I have reviewed the Medications.     Review of Systems  Anesthesia Hx:  No problems with previous Anesthesia    Social:  Smoker, Former Smoker, Alcohol Use    Hematology/Oncology:  Hematology Normal   Oncology Normal     EENT/Dental:EENT/Dental Normal   Cardiovascular:   Hypertension Dysrhythmias atrial fibrillation    Pulmonary:   COPD, moderate    Renal/:  Renal/ Normal     Hepatic/GI:  Hepatic/GI Normal colitis   Musculoskeletal:  Musculoskeletal Normal    Neurological:  Neurology Normal    Endocrine:  Endocrine Normal    Dermatological:  Skin Normal    Psych:  Psychiatric Normal           Physical Exam  General: Well nourished    Airway:  Mallampati: III / III  Mouth Opening: Normal  TM Distance: > 6 cm  Tongue: Normal  Neck ROM: Normal ROM    Chest/Lungs:  Clear to auscultation, Normal Respiratory Rate    Heart:  Rate: Normal  Rhythm: Regular Rhythm        Anesthesia Plan  Type of Anesthesia, risks & benefits discussed:    Anesthesia Type: Gen Supraglottic Airway  Intra-op Monitoring Plan: Standard ASA Monitors  Post Op Pain Control Plan: multimodal analgesia  Induction:  IV  Informed Consent: Informed consent signed with the Patient and all parties understand the risks and agree with anesthesia plan.  All questions answered.   ASA Score: 3  Day of Surgery Review of History & Physical: H&P Update referred to the surgeon/provider.I have interviewed and examined the patient. I have reviewed the patient's H&P dated: There are no significant changes. H&P completed by Anesthesiologist.    Ready For Surgery From Anesthesia Perspective.     .

## 2022-08-18 NOTE — ANESTHESIA PROCEDURE NOTES
Intubation    Date/Time: 8/18/2022 8:42 AM  Performed by: Akbar Beckofrd CRNA  Authorized by: Miki Kumar MD     Intubation:     Induction:  Intravenous    Intubated:  Postinduction    Mask Ventilation:  Easy mask    Attempts:  1    Attempted By:  CRNA    Difficult Airway Encountered?: No      Complications:  None    Airway Device:  Supraglottic airway/LMA    Airway Device Size:  4.0    Style/Cuff Inflation:  Cuffed (inflated to minimal occlusive pressure)    Placement Verified By:  Capnometry    Complicating Factors:  None    Findings Post-Intubation:  BS equal bilateral and atraumatic/condition of teeth unchanged

## 2022-08-18 NOTE — BRIEF OP NOTE
[facility] - Orthopedic Periop Services  Brief Operative Note    Surgery Date: 8/18/2022     Surgeon(s) and Role:     * Santiago Roman MD - Primary    Assisting Surgeon: None    Pre-op Diagnosis:  Olecranon bursitis of left elbow [M70.22]    Post-op Diagnosis:  Post-Op Diagnosis Codes:     * Olecranon bursitis of left elbow [M70.22]    Procedure(s) (LRB):  BURSECTOMY, OLECRANON (Left)    Anesthesia: General    Description of the findings of the procedure(s): See Op Note     Estimated Blood Loss: * No values recorded between 8/18/2022  9:10 AM and 8/18/2022 10:06 AM *         Specimens:   Specimen (24h ago, onward)             Start     Ordered    08/18/22 0938  Surgical Pathology  Once        Question Answer Comment   Number of specimens 1    Source(s): Elbow, Left    Clinical History: LEFT ELBOW BURSA        08/18/22 0937                  Discharge Note    OUTCOME: Patient tolerated treatment/procedure well without complication and is now ready for discharge.    DISPOSITION: Home or Self Care    FINAL DIAGNOSIS:  Olecranon bursitis of left elbow    FOLLOWUP: In clinic    DISCHARGE INSTRUCTIONS:    Discharge Procedure Orders   Diet general     Keep surgical extremity elevated     Ice to affected area   Order Comments: using barrier between ice and skin (specify duration&frequency)     Call MD for:  temperature >100.4     Call MD for:  persistent nausea and vomiting     Call MD for:  severe uncontrolled pain     Call MD for:  difficulty breathing, headache or visual disturbances     Call MD for:  redness, tenderness, or signs of infection (pain, swelling, redness, odor or green/yellow discharge around incision site)     Call MD for:  hives     Call MD for:  persistent dizziness or light-headedness     Call MD for:  extreme fatigue     Leave dressing on - Keep it clean, dry, and intact until clinic visit     Activity as tolerated     Shower on day dressing removed (No bath)     Weight bearing as tolerated   [facility] - Orthopedic Periop Services  Brief Operative Note    Surgery Date: 8/18/2022     Surgeon(s) and Role:     * Santiago Roman MD - Primary    Assisting Surgeon: None    Pre-op Diagnosis:  Olecranon bursitis of left elbow [M70.22]    Post-op Diagnosis:  Post-Op Diagnosis Codes:     * Olecranon bursitis of left elbow [M70.22]    Procedure(s) (LRB):  BURSECTOMY, OLECRANON (Left)    Anesthesia: General    Description of the findings of the procedure(s): See Op Note     Estimated Blood Loss: * No values recorded between 8/18/2022  9:10 AM and 8/18/2022 10:00 AM *minimal         Specimens:   Specimen (24h ago, onward)             Start     Ordered    08/18/22 0938  Surgical Pathology  Once        Question Answer Comment   Number of specimens 1    Source(s): Elbow, Left    Clinical History: LEFT ELBOW BURSA        08/18/22 0937                  Discharge Note    OUTCOME: Patient tolerated treatment/procedure well without complication and is now ready for discharge.    DISPOSITION: Home or Self Care    FINAL DIAGNOSIS:  Olecranon bursitis of left elbow    FOLLOWUP: In clinic    DISCHARGE INSTRUCTIONS:    Discharge Procedure Orders   Diet general     Keep surgical extremity elevated     Ice to affected area   Order Comments: using barrier between ice and skin (specify duration&frequency)     Call MD for:  temperature >100.4     Call MD for:  persistent nausea and vomiting     Call MD for:  severe uncontrolled pain     Call MD for:  difficulty breathing, headache or visual disturbances     Call MD for:  redness, tenderness, or signs of infection (pain, swelling, redness, odor or green/yellow discharge around incision site)     Call MD for:  hives     Call MD for:  persistent dizziness or light-headedness     Call MD for:  extreme fatigue     Leave dressing on - Keep it clean, dry, and intact until clinic visit     Activity as tolerated     Shower on day dressing removed (No bath)     Weight bearing as tolerated

## 2022-08-18 NOTE — INTERVAL H&P NOTE
The patient has been examined and the H&P has been reviewed:    I concur with the findings and no changes have occurred since H&P was written.    Surgery risks, benefits and alternative options discussed and understood by patient/family.          There are no hospital problems to display for this patient.       Statement Selected

## 2022-08-18 NOTE — OR NURSING
0959 PT TO PACU ASLEEP, ORAL AIRWAY IN PLACE. IV INFUSING TO R ARM. DRESSING TO L ARM C/D/I. SLING IN PLACE. VSS. SAFETY MEASURES IN PLACE.    1002 ORAL AIRWAY REMOVED. PT PLACED ON 3L NC. VSS. NO DISTRESS NOTED.    1010 PT AROUSABLE TO VOICE. NO DISTRESS NOTED. VSS.     1020 PT C/O PAIN 8/10 TO L ARM. MORPHINE GIVEN. SEE MAR.     1026 PT STATES PAIN STILL 8/10. MORPHINE GIVEN. SEE MAR.     1040 DRESSING TO L ARM C/D/I. SLING IN PLACE. SAFETY MEASURES ONGOING. PT WITH CHRONIC A-FIB. SAO2 BETWEEN 80/100% ON 2L. UNABLE TO GIVE FURTHER IV NARCOTICS AT THIS TIME.     1043 OUT OF PACU    1045 PT TRANSFERRED TO [facility] 21. RELEASED TO BLAIRE GANN RN. PT AWAKE AND ALERT. RESP EVEN AND UNLABORED. IV INFUSING TO R ARM. DRESSING TO L ARM C/D/I. SLING IN PLACE. VS: /87, HR 80, RR 16, SAO2 100% ON 2L. SAFETY MEASURES IN PLACE. FAMILY AT BEDSIDE.

## 2022-09-01 PROBLEM — Z09 FOLLOW UP: Status: ACTIVE | Noted: 2018-05-22

## 2022-10-13 PROBLEM — K40.90 UNILATERAL INGUINAL HERNIA WITHOUT OBSTRUCTION OR GANGRENE: Status: ACTIVE | Noted: 2022-01-01

## 2022-10-13 PROBLEM — R10.9 ABDOMINAL PAIN: Status: ACTIVE | Noted: 2022-01-01

## 2022-10-13 PROBLEM — K43.9 HERNIA OF ABDOMINAL WALL: Status: ACTIVE | Noted: 2022-01-01

## 2022-10-13 PROBLEM — R63.4 WEIGHT LOSS: Status: ACTIVE | Noted: 2022-01-01

## 2022-10-13 PROBLEM — R10.32 LEFT LOWER QUADRANT ABDOMINAL PAIN: Status: ACTIVE | Noted: 2022-01-01

## 2022-10-13 NOTE — PROGRESS NOTES
Subjective:       Patient ID: Joao Chi Sr is a 66 y.o. male.    Chief Complaint: Abdominal Pain and Bloated (Patient has abd pain on left lower side and bloating)    Abdominal pain- left inguinal hernia  Weight loss- states he has lost 8 lbs in the past 2 weeks  Change in stool color  Review of Systems   Constitutional:  Negative for appetite change, fatigue and fever.   HENT:  Negative for nasal congestion, ear pain and sore throat.    Eyes:  Negative for pain, discharge and itching.   Respiratory:  Negative for cough and shortness of breath.    Cardiovascular:  Negative for chest pain and leg swelling.   Gastrointestinal:  Positive for abdominal pain, change in bowel habit and change in bowel habit. Negative for anal bleeding, blood in stool, constipation, diarrhea, nausea, rectal pain, vomiting, reflux and fecal incontinence.   Musculoskeletal:  Negative for back pain, gait problem and neck pain.   Integumentary:  Negative for rash and wound.   Neurological:  Negative for dizziness, weakness and headaches.   All other systems reviewed and are negative.      Objective:      Physical Exam  Vitals and nursing note reviewed.   Constitutional:       General: He is not in acute distress.     Appearance: Normal appearance. He is not ill-appearing, toxic-appearing or diaphoretic.   HENT:      Head: Normocephalic.   Eyes:      General: No scleral icterus.        Right eye: No discharge.         Left eye: No discharge.      Extraocular Movements: Extraocular movements intact.      Conjunctiva/sclera: Conjunctivae normal.      Pupils: Pupils are equal, round, and reactive to light.   Cardiovascular:      Rate and Rhythm: Normal rate and regular rhythm.      Pulses: Normal pulses.      Heart sounds: Normal heart sounds. No murmur heard.  Pulmonary:      Effort: Pulmonary effort is normal. No respiratory distress.      Breath sounds: Normal breath sounds. No wheezing, rhonchi or rales.   Abdominal:      General: Bowel  sounds are normal.      Palpations: Abdomen is soft.      Tenderness: There is abdominal tenderness (generalized). There is no right CVA tenderness, left CVA tenderness, guarding or rebound.      Hernia: A hernia is present.          Comments: Soft, reducible hernia noted to the left inguinal region   Musculoskeletal:         General: Normal range of motion.      Cervical back: Neck supple. No tenderness.   Lymphadenopathy:      Cervical: No cervical adenopathy.   Skin:     General: Skin is warm and dry.      Capillary Refill: Capillary refill takes less than 2 seconds.      Findings: No rash.   Neurological:      Mental Status: He is alert and oriented to person, place, and time.   Psychiatric:         Mood and Affect: Mood normal.         Behavior: Behavior normal.         Thought Content: Thought content normal.         Judgment: Judgment normal.          Assessment:       1. Left lower quadrant abdominal pain    2. Abdominal pain, unspecified abdominal location    3. Hernia of abdominal wall    4. Unilateral inguinal hernia without obstruction or gangrene, recurrence not specified    5. Weight loss        Plan:   Left lower quadrant abdominal pain  -     X-Ray KUB; Future; Expected date: 10/13/2022    Abdominal pain, unspecified abdominal location  -     CBC Auto Differential; Future; Expected date: 10/13/2022  -     Comprehensive Metabolic Panel; Future; Expected date: 10/13/2022  -     Amylase; Future; Expected date: 10/13/2022  -     Lipase; Future; Expected date: 10/13/2022  -     Ambulatory referral/consult to General Surgery; Future; Expected date: 10/20/2022  -     Ambulatory referral/consult to Gastroenterology; Future; Expected date: 10/20/2022  -     CT Abdomen Pelvis W Wo Contrast; Future; Expected date: 10/13/2022    Hernia of abdominal wall    Unilateral inguinal hernia without obstruction or gangrene, recurrence not specified  -     Ambulatory referral/consult to General Surgery; Future; Expected  date: 10/20/2022    Weight loss  -     Ambulatory referral/consult to Gastroenterology; Future; Expected date: 10/20/2022

## 2022-11-01 NOTE — ASSESSMENT & PLAN NOTE
Risks and benefits of surgery were discussed including infection, bleeding, recurrence, neuralgia, mesh problems, injury to the cord and cord structures which could result in testicular atrophy, and the unforeseen. He understands wish to proceed.

## 2022-11-01 NOTE — PATIENT INSTRUCTIONS
[facility]  [address] [place] [address]      YOUR SURGERY DATE IS 11/14/22       DO NOT EAT OR DRINK ANYTHING AFTER MIDNIGHT BEFORE YOU SURGERY    BRING ALL MEDICATIONS YOU ARE CURRENTLY TAKING WITH YOU.  IF YOU ARE TAKING  A BLOOD PRESSURE MEDICATION, TAKE YOUR BLOOD PRESSURE MEDICATION WITH A   SIP OF WATER WHEN YOU GET UP THE MORNING OF SURGERY.     YOU MUST PRE-ADMIT AT THE FOLLOWING WEBSITE:  WEBSITE: [email or web address]  PASSWORD: [email or web address]    A STAFF MEMBER FROM THE [facility] WILL CALL YOU THE DAY BEFORE  SURGERY TO LET YOU KNOW WHAT TIME TO ARRIVE THE MORNING OF SURGERY.  IF YOU HAVE NOT HEARD FROM THEM BY 4 P.M. THE DAY BEFORE YOUR SURGERY,   PLEASE CALL THEM [phone].      IF YOU HAVE ANY QUESTIONS YOU MAY CONTACT OUR OFFICE [phone]. [phone]

## 2022-11-01 NOTE — PROGRESS NOTES
Subjective:       Patient ID: Joao Chi Sr is a 66 y.o. male.    Chief Complaint: Pre-op Exam (Unilateral inguinal hernia w/o obstruction or gangrene) and Hernia (Left hernia)    66-year-old male patient who presents with a history of swelling in the left lower abdomen.  He has had recent trouble with increasing pain (over 2-3 weeks).  He has noticed a bulge for at least this long.  The patient reports a remote history of right inguinal hernia repair (as child).  In addition, the patient has been having mild amount of tenderness in the right upper quadrant, and states that he does not have much appetite.  The patient reports 25 lb weight loss due to poor appetite.    family history includes Heart failure in his mother; No Known Problems in his brother, brother, father, and sister.  Past Medical History:   Diagnosis Date    A-fib     Colitis     COPD (chronic obstructive pulmonary disease)     High cholesterol     Hypertension       Past Surgical History:   Procedure Laterality Date    BACK SURGERY      COLONOSCOPY N/A 5/22/2018    Procedure: COLONOSCOPY;  Surgeon: Sudhakar Herrera MD;  [facility];  Service: Endoscopy;  Laterality: N/A;    OLECRANON BURSECTOMY Left 8/18/2022    Procedure: BURSECTOMY, OLECRANON;  Surgeon: Santiago Roman MD;  Location: [facility];  Service: Orthopedics;  Laterality: Left;    WRIST SURGERY         reports that he has been smoking cigarettes. He has a 40.00 pack-year smoking history. He has never used smokeless tobacco. He reports current alcohol use of about 1.0 standard drink per week. He reports current drug use. Drug: Cocaine.     Review of Systems   All other systems reviewed and are negative.       Objective:      There were no vitals taken for this visit.   Physical Exam  Cardiovascular:      Rate and Rhythm: Normal rate.      Heart sounds: No murmur heard.  Pulmonary:      Effort: Pulmonary effort is normal.   Abdominal:      Palpations:  Abdomen is soft.      Hernia: A hernia (Bulge in left groin consistent with inguinal hernia, reducible) is present.   Neurological:      General: No focal deficit present.      Mental Status: He is alert.       Assessment/Plan:         Unilateral inguinal hernia without obstruction or gangrene, recurrence not specified  -     Ambulatory referral/consult to General Surgery  -     EKG 12-lead; Future; Expected date: 11/01/2022  -     Ambulatory Referral to External Surgery    Abdominal pain, unspecified abdominal location  -     Ambulatory referral/consult to General Surgery         Problem List Items Addressed This Visit          GI    Abdominal pain    Current Assessment & Plan     Will order gallbladder ultrasound for right upper quadrant pain evaluation.         Unilateral inguinal hernia without obstruction or gangrene - Primary    Current Assessment & Plan      Risks and benefits of surgery were discussed including infection, bleeding, recurrence, neuralgia, mesh problems, injury to the cord and cord structures which could result in testicular atrophy, and the unforeseen. He understands wish to proceed.           Relevant Orders    EKG 12-lead    Ambulatory Referral to External Surgery

## 2022-11-03 NOTE — TELEPHONE ENCOUNTER
Pt notified. Voiced understanding.----- Message from GOMEZ Lomeli sent at 10/27/2022 11:50 AM CDT -----  Please notify pt of results- no abdominal abnormality noted-

## 2022-11-17 PROBLEM — S30.1XXA ABDOMINAL WALL SEROMA: Status: ACTIVE | Noted: 2022-01-01

## 2022-11-17 NOTE — DISCHARGE INSTRUCTIONS
KEEP YOUR FOLLOW UP APPOINTMENT WITH DR ROLON.  DO NOT TAKE YOUR ELIQUIS FOR 2 MORE DAYS.  RETURN TO THE EMERGENCY DEPARTMENT AS NEEDED.

## 2022-11-17 NOTE — ED PROVIDER NOTES
Encounter Date: 11/17/2022    SCRIBE #1 NOTE: I, Naida Armas, am scribing for, and in the presence of,  Xi Dean MD. I have scribed the entire note.     History     Chief Complaint   Patient presents with    Abdominal Pain     Pt states he had surgery 2 days ago - pt states left lower abd     66 y.o. male presents to the emergency department with complaints of swelling in the left inguinal area.   Patient stated he had a left inguinal hernia repair done by Dr. Perez on 11/14. Patient stated he has been doing well post op until yesterday morning where he found swelling near the incision site. Patient stated he has been taking narcotics for pain relief but the pain has never been severe. Patient is concerned about the swelling. Patient has a hx of COPD. No other symptoms were reported.     The history is provided by the patient. No  was used.   Review of patient's allergies indicates:  No Known Allergies  Past Medical History:   Diagnosis Date    A-fib     Colitis     COPD (chronic obstructive pulmonary disease)     High cholesterol     Hypertension      Past Surgical History:   Procedure Laterality Date    BACK SURGERY      COLONOSCOPY N/A 5/22/2018    Procedure: COLONOSCOPY;  Surgeon: Sudhakar Herrera MD;  Location: [facility];  Service: Endoscopy;  Laterality: N/A;    OLECRANON BURSECTOMY Left 8/18/2022    Procedure: BURSECTOMY, OLECRANON;  Surgeon: Santiago Roman MD;  Location: [facility];  Service: Orthopedics;  Laterality: Left;    WRIST SURGERY       Family History   Problem Relation Age of Onset    Heart failure Mother     No Known Problems Father     No Known Problems Sister     No Known Problems Brother     No Known Problems Brother      Social History     Tobacco Use    Smoking status: Every Day     Packs/day: 1.00     Years: 40.00     Pack years: 40.00     Types: Cigarettes    Smokeless tobacco: Never   Substance Use Topics    Alcohol use: Yes      Alcohol/week: 1.0 standard drink     Types: 1 Shots of liquor per week     Comment: Occ    Drug use: Yes     Types: Cocaine     Comment: 2wees ago     Review of Systems   Constitutional: Negative.  Negative for fever.   HENT: Negative.     Eyes: Negative.    Respiratory: Negative.     Cardiovascular: Negative.    Gastrointestinal: Negative.    Endocrine: Negative.    Genitourinary: Negative.    Musculoskeletal: Negative.    Skin:         Swelling to the left inguinal area   Allergic/Immunologic: Negative.    Neurological: Negative.    Hematological: Negative.    Psychiatric/Behavioral: Negative.     All other systems reviewed and are negative.    Physical Exam     Initial Vitals [11/17/22 0051]   BP Pulse Resp Temp SpO2   (!) 187/113 70 (!) 22 97.6 °F (36.4 °C) (!) 92 %      MAP       --         Physical Exam    Nursing note and vitals reviewed.  Constitutional: He appears well-developed and well-nourished.   Neck:   Normal range of motion.  Cardiovascular:  Normal rate, regular rhythm and normal heart sounds.           Pulmonary/Chest: He has wheezes (COPD).   Abdominal: Abdomen is soft.   Musculoskeletal:         General: Normal range of motion.      Cervical back: Normal range of motion.      Comments: Significant bulge in left inguinal area underneath incision site.   Incision appears to be healing.   No erythema nor drainage.      Neurological: He is alert and oriented to person, place, and time.   Skin: Skin is warm.       ED Course   Procedures  Labs Reviewed   CBC WITH DIFFERENTIAL - Abnormal; Notable for the following components:       Result Value    Lymphocytes % 24.4 (*)     Monocytes % 9.1 (*)     All other components within normal limits   BASIC METABOLIC PANEL - Normal   CULTURE, BLOOD   CULTURE, BLOOD   CBC W/ AUTO DIFFERENTIAL    Narrative:     The following orders were created for panel order CBC auto differential.  Procedure                               Abnormality         Status                      ---------                               -----------         ------                     CBC with Differential[[id number]]        Abnormal            Final result                 Please view results for these tests on the individual orders.          Imaging Results              CT Abdomen Pelvis With Contrast (Final result)  Result time 11/17/22 07:41:53      Final result by Santiago Elilott MD (11/17/22 07:41:53)                   Impression:      Postoperative changes in the left inguinal region from recent hernia repair.  There is air in fluid in this location which may represent a small postoperative hematoma/seroma.  No defined abscess identified.      Electronically signed by: Santiago Elliott  Date:    11/17/2022  Time:    07:41               Narrative:    EXAMINATION:  CT ABDOMEN PELVIS WITH CONTRAST    CLINICAL HISTORY:  LLQ abdominal pain;  recent left inguinal hernia repair a few days ago.    TECHNIQUE:  Low dose axial images, sagittal and coronal reformations were obtained from the lung bases to the pubic symphysis following the IV administration of 100 mL of Isovue 370 .  Oral contrast was not given.    COMPARISON:  10/27/2022    FINDINGS:  Lung bases appear clear.    The liver and gallbladder unremarkable.  Adrenals and kidneys appear normal.  Spleen and pancreas unremarkable.    No pneumoperitoneum.  No ascites.    There are now postsurgical changes in the left inguinal region from what appears to be recent hernia repair there is air and fluid in this location without a defined peripherally enhancing collection.  There is overlying soft tissue swelling as well.    No adenopathy.  Vascular structures have calcifications as before.  The urinary bladder decompressed and unremarkable.  Monica nodes seen in the spine.  No acute fracture.                                       Medications   0.9%  NaCl infusion ( Intravenous New Bag 11/17/22 0345)   LIDOcaine HCL 10 mg/ml (1%) injection 10 mL (has no administration in  time range)   albuterol-ipratropium 2.5 mg-0.5 mg/3 mL nebulizer solution 3 mL (3 mLs Nebulization Given 11/17/22 0120)   hydrALAZINE injection 10 mg (10 mg Intravenous Given 11/17/22 0135)   iopamidoL (ISOVUE-370) injection 100 mL (100 mLs Intravenous Given 11/17/22 0210)   piperacillin-tazobactam (ZOSYN) 4.5 g in dextrose 5 % in water (D5W) 5 % 100 mL IVPB (MB+) (0 g Intravenous Stopped 11/17/22 0415)   morphine injection 4 mg (4 mg Intravenous Given 11/17/22 0359)   ondansetron injection 4 mg (4 mg Intravenous Given 11/17/22 0359)     Medical Decision Making:   ED Management:  I discussed case with Dr. Ferreira, who is on call for Dr. Perez. He will evaluate patient in the ED.           Attending Attestation:           Physician Attestation for Scribe:  Physician Attestation Statement for Scribe #1: I, Xi Dean MD, reviewed documentation, as scribed by Naida Armas in my presence, and it is both accurate and complete.                        Clinical Impression:   Final diagnoses:  [S30.1XXA] Abdominal wall seroma, initial encounter (Primary)      ED Disposition Condition    Discharge Stable          ED Prescriptions    None       Follow-up Information       Follow up With Specialties Details Why Contact Info    Cornelia Weiss MD Family Medicine  As needed [address]   [address]  [facility] [address]  [phone]               Yusuf Edgar MD  11/17/22 1911

## 2022-11-18 NOTE — PROCEDURES
"Joao Chi Sr is a 66 y.o. male patient.    Temp: 97.6 °F (36.4 °C) (11/17/22 0051)  Pulse: 81 (11/17/22 1221)  Resp: 20 (11/17/22 0359)  BP: (!) 186/122 (11/17/22 1221)  SpO2: 97 % (11/17/22 1221)  Weight: 53.1 kg (117 lb) (11/17/22 0051)  Height: 5' 9" (175.3 cm) (11/17/22 0051)       Incision and Drainage    Date/Time: 11/17/2022 9:59 PM  Location procedure was performed: [facility]  Performed by: Oswald Perez MD  Authorized by: Oswald Perez MD   Pre-operative diagnosis: Hematoma after Left Inguinal Hernia repair  Post-operative diagnosis: Hematoma after Left Inguinal Hernia repair  Consent Done: Yes  Consent: Verbal consent obtained. Written consent obtained.  Risks and benefits: risks, benefits and alternatives were discussed  Consent given by: patient  Patient understanding: patient states understanding of the procedure being performed  Relevant documents: relevant documents present and verified  Imaging studies: imaging studies available  Patient identity confirmed: verbally with patient  Type: hematoma  Location: left groin.  Anesthesia: local infiltration    Anesthesia:  Local Anesthetic: lidocaine 1% without epinephrine  Description of findings: 4cm x 2cm fusiform clot removed from subcu, superficial to external olbique;  wound closed with vicryl, subcuticular position   Risk factor: underlying major vessel and underlying major nerve  Scalpel size: used scissors to cut subcuticular stitch.  Did not need scalpel, as incision is three days old.  Incision type: single straight  Complexity: simple  Drainage: bloody  Drainage amount: moderate  Wound treatment: incision, clot removal, drainage and expression of material  Complications: No  Estimated blood loss (mL): 25  Specimens: No  Implants: No  Patient tolerance: Patient tolerated the procedure well with no immediate complications  Comments: Patient remained in the ER stretcher, and left groin was prepped with Betadine.  Lidocaine " was injected in the skin and subcutaneous tissues of previous incision.  Scissors were used to cut the previous subcuticular closure as well as 2 of the subcu closure sutures.  Small amount bloody fluid emanated from the wound.  Following manual compression, a larger clot was able to be teased out with a hemostat and digital dissection.  Lidocaine was further injected in the subcutaneous tissues to assist with patient tolerance of the procedure.  Finding no more hematoma or liquid blood, the wound was irrigated with saline solution as well as some local.  A single subcutaneous suture was placed using Vicryl.  Following this subcuticular stitch was used in running fashion to complete skin closure.  Steri-Strips were placed and sterile dressings were applied.        11/17/2022

## 2022-11-23 NOTE — PROGRESS NOTES
Subjective:       Patient ID: Joao Chi Sr is a 66 y.o. male.    Chief Complaint: Cough, Shortness of Breath, Nasal Congestion, Generalized Body Aches, and Fever    Pt with copd, off oxygen his o2 is typically in high 80s.     Review of Systems   Constitutional:  Negative for activity change, appetite change, chills, diaphoresis, fatigue, fever and unexpected weight change.   HENT:  Positive for nasal congestion, postnasal drip, rhinorrhea and sinus pressure/congestion. Negative for dental problem, drooling, ear discharge, ear pain, facial swelling, hearing loss, mouth sores, nosebleeds, sneezing, sore throat, tinnitus, trouble swallowing, voice change and goiter.    Eyes:  Negative for photophobia, pain, discharge, redness, itching and visual disturbance.   Respiratory:  Positive for cough. Negative for apnea, choking, chest tightness, shortness of breath, wheezing and stridor.    Cardiovascular:  Negative for chest pain, palpitations, leg swelling and claudication.   Gastrointestinal:  Negative for abdominal distention, abdominal pain, anal bleeding, blood in stool, change in bowel habit, constipation, diarrhea, nausea, vomiting, reflux, fecal incontinence and change in bowel habit.   Endocrine: Negative for cold intolerance, heat intolerance, polydipsia, polyphagia and polyuria.   Genitourinary:  Negative for bladder incontinence, decreased urine volume, difficulty urinating, discharge, dysuria, enuresis, erectile dysfunction, flank pain, frequency, genital sores, hematuria, penile pain, testicular pain and urgency.   Musculoskeletal:  Negative for arthralgias, back pain, gait problem, joint swelling, leg pain, myalgias, neck pain, neck stiffness and joint deformity.   Integumentary:  Negative for pallor, rash, wound and mole/lesion.   Allergic/Immunologic: Negative for environmental allergies, food allergies and frequent infections.   Neurological:  Negative for dizziness, vertigo, tremors, seizures,  syncope, facial asymmetry, speech difficulty, weakness, light-headedness, numbness, headaches, coordination difficulties, memory loss and coordination difficulties.   Hematological:  Negative for adenopathy. Does not bruise/bleed easily.   Psychiatric/Behavioral:  Negative for agitation, behavioral problems, confusion, decreased concentration, dysphoric mood, hallucinations, self-injury, sleep disturbance and suicidal ideas. The patient is not nervous/anxious and is not hyperactive.        Objective:      Physical Exam  Vitals reviewed.   Constitutional:       Appearance: Normal appearance. He is normal weight.   HENT:      Head: Normocephalic and atraumatic.      Right Ear: Tympanic membrane and ear canal normal.      Left Ear: Tympanic membrane, ear canal and external ear normal.      Nose: Congestion and rhinorrhea present.      Mouth/Throat:      Mouth: Mucous membranes are moist.      Pharynx: Oropharynx is clear. Posterior oropharyngeal erythema present.   Eyes:      Extraocular Movements: Extraocular movements intact.      Conjunctiva/sclera: Conjunctivae normal.      Pupils: Pupils are equal, round, and reactive to light.   Cardiovascular:      Rate and Rhythm: Normal rate and regular rhythm.      Pulses: Normal pulses.      Heart sounds: Normal heart sounds.   Pulmonary:      Effort: Pulmonary effort is normal.      Breath sounds: Wheezing and rhonchi present.   Abdominal:      General: Abdomen is flat. Bowel sounds are normal.      Palpations: Abdomen is soft.   Musculoskeletal:         General: Normal range of motion.      Cervical back: Normal range of motion and neck supple.   Skin:     General: Skin is warm and dry.   Neurological:      General: No focal deficit present.      Mental Status: He is alert and oriented to person, place, and time. Mental status is at baseline.   Psychiatric:         Mood and Affect: Mood normal.         Behavior: Behavior normal.         Thought Content: Thought content  normal.         Judgment: Judgment normal.       Assessment:       1. Influenza    2. Encounter for screening laboratory testing for COVID-19 virus          Plan:     Influenza  -     dexAMETHasone injection 4 mg    Encounter for screening laboratory testing for COVID-19 virus  -     POCT SARS-COV2 (COVID) with Flu Antigen    Other orders  -     azithromycin (Z-ALEX) 250 MG tablet; Take 2 tablets by mouth on day 1; Take 1 tablet by mouth on days 2-5  Dispense: 6 tablet; Refill: 0  -     predniSONE (DELTASONE) 20 MG tablet; Take 1 tablet (20 mg total) by mouth once daily. for 5 days  Dispense: 5 tablet; Refill: 0  -     oseltamivir (TAMIFLU) 75 MG capsule; Take 1 capsule (75 mg total) by mouth 2 (two) times daily. for 5 days  Dispense: 10 capsule; Refill: 0

## 2022-11-25 PROBLEM — J11.1 INFLUENZA: Status: RESOLVED | Noted: 2022-01-01 | Resolved: 2022-01-01

## 2022-11-25 PROBLEM — R65.20 SEVERE SEPSIS: Status: ACTIVE | Noted: 2022-01-01

## 2022-11-25 PROBLEM — A41.9 SEVERE SEPSIS: Status: ACTIVE | Noted: 2022-01-01

## 2022-11-25 PROBLEM — J10.00 PNEUMONIA DUE TO INFLUENZA A VIRUS: Status: ACTIVE | Noted: 2022-01-01

## 2022-11-25 PROBLEM — R79.89 D-DIMER, ELEVATED: Status: ACTIVE | Noted: 2022-01-01

## 2022-11-25 PROBLEM — J11.1 INFLUENZA: Status: ACTIVE | Noted: 2022-01-01

## 2022-11-25 PROBLEM — J96.02 ACUTE RESPIRATORY FAILURE WITH HYPOXIA AND HYPERCARBIA: Status: ACTIVE | Noted: 2022-01-01

## 2022-11-25 PROBLEM — I48.91 ATRIAL FIBRILLATION WITH RVR: Status: ACTIVE | Noted: 2022-01-01

## 2022-11-25 PROBLEM — R79.89 ELEVATED TROPONIN: Status: ACTIVE | Noted: 2022-01-01

## 2022-11-25 PROBLEM — J96.01 ACUTE RESPIRATORY FAILURE WITH HYPOXIA AND HYPERCARBIA: Status: ACTIVE | Noted: 2022-01-01

## 2022-11-25 PROBLEM — N17.9 AKI (ACUTE KIDNEY INJURY): Status: ACTIVE | Noted: 2022-01-01

## 2022-11-25 PROBLEM — J44.1 COPD EXACERBATION: Status: ACTIVE | Noted: 2022-01-01

## 2022-11-25 PROBLEM — I46.9 CARDIOPULMONARY ARREST: Status: ACTIVE | Noted: 2022-01-01

## 2022-11-25 NOTE — Clinical Note
Upon [facility] staff leaving ICU S, [place] catheter noted to be in the RV. Dr. Broussard at bedside to reposition swan.

## 2022-11-25 NOTE — Clinical Note
At approx 1615, [facility] staff to ICU to transport patient to cath lab. During this time, pt became hypotensive requiring increase in pressors. Lyssa Quinonez RN and ICU S staff nurse at bedside. . Pt is intubated and sedated prior to cath lab staff's arrival to ICU S.

## 2022-11-25 NOTE — ED PROVIDER NOTES
Encounter Date: 11/25/2022    SCRIBE #1 NOTE: I, Gisella Yee, am scribing for, and in the presence of,  Jose A Ha MD. I have scribed the entire note.     History     Chief Complaint   Patient presents with    Cardiac Arrest     This is a 66 y/o black male,who presents to the ED via EMS with complaints of cardiac arrest. Per EMS, they were called to a local motel for unresponsiveness. The pt had a friend there with him whom gave some Hx. The friend stated that the pt got up to take his medication and asked for water. He then laid down on the bed and that's when he became unresponsive. He was intubated on scene and then given 0.4 Narcan, 3 epi and 1 bicarb. Chest compressions were started and a pulse was brought back as EMS was pulling in to the ED. There are no other complaints/pain in the ED at this time. He has a known hx of Afib, COPD, and HTN.     The history is provided by the patient. No  was used.   Review of patient's allergies indicates:  No Known Allergies  Past Medical History:   Diagnosis Date    A-fib     Colitis     COPD (chronic obstructive pulmonary disease)     High cholesterol     Hypertension      Past Surgical History:   Procedure Laterality Date    BACK SURGERY      COLONOSCOPY N/A 5/22/2018    Procedure: COLONOSCOPY;  Surgeon: Sudhakar Herrera MD;  Location: [facility];  Service: Endoscopy;  Laterality: N/A;    OLECRANON BURSECTOMY Left 8/18/2022    Procedure: BURSECTOMY, OLECRANON;  Surgeon: Santiago Roman MD;  Location: [facility];  Service: Orthopedics;  Laterality: Left;    WRIST SURGERY       Family History   Problem Relation Age of Onset    Heart failure Mother     No Known Problems Father     No Known Problems Sister     No Known Problems Brother     No Known Problems Brother      Social History     Tobacco Use    Smoking status: Every Day     Packs/day: 1.00     Years: 40.00     Pack years: 40.00     Types: Cigarettes    Smokeless  tobacco: Never   Substance Use Topics    Alcohol use: Yes     Alcohol/week: 1.0 standard drink     Types: 1 Shots of liquor per week     Comment: Occ    Drug use: Yes     Types: Cocaine     Comment: 2wees ago     Review of Systems   Unable to perform ROS: Patient unresponsive     Physical Exam     Initial Vitals   BP Pulse Resp Temp SpO2   11/25/22 1726 11/25/22 1726 11/25/22 1726 11/25/22 1835 11/25/22 1730   102/75 105 14 96.2 °F (35.7 °C) 98 %      MAP       --                Physical Exam    Nursing note and vitals reviewed.  Constitutional: He appears well-developed and well-nourished. Pulses:Carotid, Radial and Femoral palpable. Airway: Endotracheal Tube present.   HENT:   Head: Normocephalic and atraumatic. No head trauma present.   Eyes: Pupils: Normal pupils. EOM are normal. Pupils are equal, round, and reactive to light.   Neck: Neck supple. No thyromegaly present. No JVD present.   Normal range of motion.  Cardiovascular:  Normal rate, regular rhythm, normal heart sounds and intact distal pulses.           No murmur heard.  Pulmonary/Chest: Breath sounds normal. No stridor. No respiratory distress. He has no wheezes.   Abdominal: Abdomen is soft. Bowel sounds are normal. He exhibits no distension. There is no abdominal tenderness.   Musculoskeletal:         General: No tenderness or edema. Normal range of motion.      Cervical back: Normal range of motion and neck supple.     Lymphadenopathy:     He has no cervical adenopathy.   Neurological: Unresponsive to stimuli. He has normal strength. No cranial nerve deficit or sensory deficit. GCS score is 15. GCS eye subscore is 4. GCS verbal subscore is 5. GCS motor subscore is 6.   Neuro exam is noted to be a 3 at the time of the exam.    Skin: Skin is warm and dry. Capillary refill takes less than 2 seconds. No rash noted.   Psychiatric: He has a normal mood and affect.       ED Course   Procedures  Labs Reviewed   TROPONIN I - Abnormal; Notable for the  following components:       Result Value    Troponin I High Sensitivity 334.4 (*)     All other components within normal limits   D DIMER, QUANTITATIVE - Abnormal; Notable for the following components:    D-Dimer 6.26 (*)     All other components within normal limits   COMPREHENSIVE METABOLIC PANEL - Abnormal; Notable for the following components:    Anion Gap 17 (*)     Glucose 183 (*)     BUN 26 (*)     Creatinine 1.34 (*)     Calcium 8.3 (*)     Total Protein 6.1 (*)     Albumin 2.6 (*)     ALT 85 (*)      (*)     eGFR 57 (*)     All other components within normal limits   CBC WITH DIFFERENTIAL - Abnormal; Notable for the following components:    MCHC 30.8 (*)     Platelet Count 125 (*)     Eosinophils % 0.0 (*)     All other components within normal limits   GLUCOSE, RANDOM - Abnormal; Notable for the following components:    Glucose 197 (*)     All other components within normal limits   LACTIC ACID, PLASMA - Abnormal; Notable for the following components:    Lactic Acid 6.8 (*)     All other components within normal limits   MANUAL DIFFERENTIAL - Abnormal; Notable for the following components:    Platelet Morphology Decreased (*)     All other components within normal limits   APTT - Normal   PROTIME-INR - Normal   MAGNESIUM - Normal   CK - Normal   CBC W/ AUTO DIFFERENTIAL    Narrative:     The following orders were created for panel order CBC auto differential.  Procedure                               Abnormality         Status                     ---------                               -----------         ------                     CBC with Differential[[id number]]        Abnormal            Final result               Manual Differential[[id number]]          Abnormal            Final result                 Please view results for these tests on the individual orders.   DRUG SCREEN, URINE (BEAKER)   URINALYSIS   EXTRA TUBES    Narrative:     The following orders were created for panel order EXTRA  TUBES.  Procedure                               Abnormality         Status                     ---------                               -----------         ------                     Lavender Top Hold[[id number]]                                In process                 Gold Top Hold[[id number]]                                    In process                   Please view results for these tests on the individual orders.   LAVENDER TOP HOLD   GOLD TOP HOLD   LACTIC ACID, PLASMA          Imaging Results              CT Head Without Contrast (Final result)  Result time 11/25/22 18:41:23      Final result by Jerome Day II, MD (11/25/22 18:41:23)                   Impression:      No findings of acute process or acute infarct      Electronically signed by: Jerome Day  Date:    11/25/2022  Time:    18:41               Narrative:    EXAMINATION:  CT HEAD WITHOUT CONTRAST    CLINICAL HISTORY:  Mental status change, unknown cause;    TECHNIQUE:  Axial CT imaging of the brain is performed without contrast with 3 mm increments.    CT dose reduction technique used - Dose Rite and tube current modulation.    COMPARISON:  None available    FINDINGS:  No evidence of hemorrhage,  mass,  mass effect,  midline shift or acute infarct seen.  There is moderate diffuse cerebral atrophy.  There are areas of decreased density seen within the white matter, likely chronic microvascular changes.  Small amount of symmetrical basal ganglia calcification present.  Otherwise the brain parenchyma attenuation and differentiation appears within normal limits. The ventricles and cisterns are normal in caliber.  No cranial or skull base abnormality is identified.                                       X-Ray Chest 1 View for Line/Tube Placement (Final result)  Result time 11/25/22 19:13:35   Procedure changed from X-Ray Chest 1 View     Final result by Jerome Day II, MD (11/25/22 19:13:35)                   Impression:      Endotracheal  tube appears in good position.  Faint pulmonary densities scattered in right lung could indicate pneumonitis.      Electronically signed by: Jerome Day  Date:    11/25/2022  Time:    19:13               Narrative:    EXAMINATION:  XR CHEST 1 VIEW FOR LINE/TUBE PLACEMENT    CLINICAL HISTORY:  cardiac arrest; Cardiac arrest, cause unspecified    COMPARISON:  21 April 2022    FINDINGS:  The heart and mediastinum are stable endotracheal in size and configuration.  Tube present at the clavicles.  The pulmonary vascularity is normal in caliber.  Faint scattered pulmonary densities right lung.  No other lung infiltrates, effusions, pneumothorax or other abnormality is demonstrated.                                    X-Rays:   Independently Interpreted Readings:   Other Readings:  Xray Chest 1 view for line/tube placement:   Endotracheal tube appears in good position.  Faint pulmonary densities scattered in right lung could indicate pneumonitis.        CT Head without contrast:   No findings of acute process or acute infarct  Medications   sodium chloride 0.9% bolus 1,000 mL (1,000 mLs Intravenous New Bag 11/25/22 1910)   sodium chloride 0.9% bolus 1,000 mL (0 mLs Intravenous Stopped 11/25/22 1836)   0.9%  NaCl infusion ( Intravenous New Bag 11/25/22 1845)                Attending Attestation:           Physician Attestation for Scribe:  Physician Attestation Statement for Scribe #1: I, Jose A Ha MD, reviewed documentation, as scribed by Gisella Yee in my presence, and it is both accurate and complete.           ED Course as of 11/25/22 1923 Fri Nov 25, 2022   1733 Review of the prior records shows surgical note on November 17th states patient had incision of the left inguinal hernia surgical site clot removal drainage and expression of material.  Clot was able to be removed with hemostat and digital dissection.  Wound had been irrigated with saline.  Wound was sutured back into place    According to  an office visit note from November 23rd 2 days ago is that patient has had some nasal congestion postnasal drip rhinorrhea sinus pressure congestion cough.  He has a history of COPD.  Off oxygen his oxygen is typically noted in the high 80s.  Influenza test was positive 2 days ago. [PK]   1821 Care transferred to Dr. Edgar [PK]   1920 Xray Chest 1 view for line/tube placement:   Endotracheal tube appears in good position.  Faint pulmonary densities scattered in right lung could indicate pneumonitis. [BW]   1921 CT Head without contrast:   No findings of acute process or acute infarct [BW]      ED Course User Index  [BW] Gisella Yee  [PK] Jose A Ha MD                   Clinical Impression:   Final diagnoses:  [I46.9] Cardiac arrest  [J11.1] Influenza (Primary)  [I46.9] Cardiopulmonary arrest      ED Disposition Condition    Admit Stable                Yusuf Edgar MD  11/25/22 1923

## 2022-11-26 PROBLEM — R57.9 SHOCK: Status: RESOLVED | Noted: 2022-01-01 | Resolved: 2022-01-01

## 2022-11-26 PROBLEM — I42.9 CARDIOMYOPATHY: Status: ACTIVE | Noted: 2022-01-01

## 2022-11-26 PROBLEM — R57.9 SHOCK: Status: ACTIVE | Noted: 2022-01-01

## 2022-11-26 PROBLEM — I21.4 NSTEMI (NON-ST ELEVATED MYOCARDIAL INFARCTION): Status: ACTIVE | Noted: 2022-01-01

## 2022-11-26 PROBLEM — I50.814 BIVENTRICULAR HEART FAILURE WITH REDUCED LEFT VENTRICULAR FUNCTION: Status: ACTIVE | Noted: 2022-01-01

## 2022-11-26 NOTE — ASSESSMENT & PLAN NOTE
- Ddimer 6.26. Age-adjusted ddimer 0.69 ug/mL, reflecting VTE possible.   - CTA showed no PE  - Venous doppler BLE negative

## 2022-11-26 NOTE — PROCEDURES
"Joao Chi Sr is a 67 y.o. male patient.    Temp: (!) 92.8 °F (33.8 °C) (11/26/22 1615)  Pulse: 67 (11/26/22 1615)  Resp: 18 (11/26/22 1615)  BP: 113/76 (11/26/22 1615)  SpO2: 99 % (11/26/22 1615)  Weight: 51.8 kg (114 lb 3.2 oz) (11/26/22 1611)  Height: 5' 10" (177.8 cm) (11/26/22 1617)       Procedures CODE Blue     11/26/2022 11:20    Called to bedside for decline in heart rate. Noted patient was no longer breathing over ventilator support. Faint, bradycardic pulse initially into asystole. Chest compressions started. ACLS protocol followed. Patient received one amp of epi. ROSC was obtained at 11:23.     Numerous attempts to contact relatives and significant others listed in contact list- no answers.    Patient will remain a full code.   "

## 2022-11-26 NOTE — ASSESSMENT & PLAN NOTE
Patient with Permanent atrial fibrillation which is controlled currently with Beta Blocker. Patient is currently in atrial fibrillation.[id number] Score: 1. HASBLED Score: >3 (high bleed risk). Anticoagulation indicated. Anticoagulation done with Eliquis.

## 2022-11-26 NOTE — ASSESSMENT & PLAN NOTE
Patient with Hypercapnic and Hypoxic Respiratory failure which is Acute.  Whether he is on home oxygen is unknown. Supplemental oxygen was provided and noted- Vent Mode: A/C  Oxygen Concentration (%):  [50] 50  Resp Rate Total:  [31 br/min] 31 br/min  Vt Set:  [400 mL] 400 mL  PEEP/CPAP:  [5 cmH20] 5 cmH20.   Signs/symptoms of respiratory failure include- tachypnea and unresponsiveness. Contributing diagnoses includes - COPD, Pneumonia and cardiac arrest Labs and images were reviewed. Patient Has recent ABG, which has been reviewed. Will treat underlying causes and adjust management of respiratory failure as follows- mechanical ventilation, steroids, xopenex and albuterol nebs.

## 2022-11-26 NOTE — HPI
66 yo M presents to [facility] ED via EMS after cardiac arrest. History is per chart review. Pt was at a motel with a friend, who gave the hx. Pt had gotten up to take his medications then laid down and became unresponsive. EMS intubated, gave 0.4 Narcan, 3 epi and 1 bicarb and started chest compressions. ROSC was achieved as soon as EMS pulled up to the ED.      11/14/22: Pt had L inguinal hernia repair by Dr. Perez.   11/17/22: [facility] ED visit for L inguinal hernia repair site swelling, which was evaluated by Dr. Ferreira, surgeon on call.   11/23/22: PCP visit to Dr. Lopez. Influenza A treated with Dexamethasone injection and sent home with Prednisone 20 mg QD x 5 days, Tamiflu and Z veronique.      ED Course: NS x 2 L given. EKG showed A fib with  with inverted T waves in precordial leads. Ddimer 6.26 but age-adjusted 690 ug/L. Lactate 6.8. Targeted temperature management protocol initiated. Pt will be admitted to the ICU under the care of Dr. Parmjit Whaley, for close monitoring and evaluation of cardiac arrest.

## 2022-11-26 NOTE — PLAN OF CARE
Problem: Adult Inpatient Plan of Care  Goal: Plan of Care Review  Outcome: Ongoing, Progressing  Goal: Patient-Specific Goal (Individualized)  Outcome: Ongoing, Progressing  Goal: Absence of Hospital-Acquired Illness or Injury  Outcome: Ongoing, Progressing  Goal: Optimal Comfort and Wellbeing  Outcome: Ongoing, Progressing  Goal: Readiness for Transition of Care  Outcome: Ongoing, Progressing     Problem: Nutrition Impairment (Mechanical Ventilation, Invasive)  Goal: Optimal Nutrition Delivery  Outcome: Ongoing, Progressing     Problem: Skin and Tissue Injury (Artificial Airway)  Goal: Absence of Device-Related Skin or Tissue Injury  Outcome: Ongoing, Progressing     Problem: Renal Function Impairment (Acute Kidney Injury/Impairment)  Goal: Effective Renal Function  Outcome: Ongoing, Progressing     Problem: Infection  Goal: Absence of Infection Signs and Symptoms  Outcome: Ongoing, Progressing     Problem: Skin Injury Risk Increased  Goal: Skin Health and Integrity  Outcome: Ongoing, Progressing

## 2022-11-26 NOTE — NURSING
1119 - PEA noted on monitor.  No palpable pulse.  ACLS protocol initiated.  See Code 2 flowsheet.      MD in room    ROSC achieved, code end 1122

## 2022-11-26 NOTE — ASSESSMENT & PLAN NOTE
- Etiology ACS vs chest compressions vs type 2 MI 2/2 severe sepsis and MILY. Although troponin elevated, the likelihood of ACS is unknown 2/2 chest compressions.   - LIONEL: 49 (intermediate/ high risk). Heart: 8 (high). Ana: 156 (25% probability of death from admission to 6 months).  - EKG: A fib with . T wave inversions in precordial leads. Repeat pending.   - Troponin 330-->295. Repeat pending.  - Continue home Lopressor 25 mg.   - Start ASA, Atorvastatin 80 mg. No ACEi/ARB 2/2 MILY and low BP.   - Echo pending  - Cardiology consulted.

## 2022-11-26 NOTE — ASSESSMENT & PLAN NOTE
- Concern for cardiogenic shock  - Echo suggestive of biventricular failure  - Etiology likely severe RV failure  - Primary team managing.   - [facility] when stable and targeted temperature therapy completed. Likely Monday.

## 2022-11-26 NOTE — ASSESSMENT & PLAN NOTE
- PSI: 129 (Risk Class IV, 8.2-9.3% mortality)  - Tested positive for Influenza A on 11/23/22.  - CXR: possible R lung pneumonitis.   - CTA: No PE, possible R heart failure and possible pneumonitis.   - Tamiflu, Vanc and Zosyn.   - Xopenex Q8H and Q4H prn in the setting of A fib with RVR. Atrovent Q8H. Solumedrol 40 mg Q6H.   - Blood and sputum Cx pending  - Mechanical ventilation

## 2022-11-26 NOTE — ASSESSMENT & PLAN NOTE
Patient with atrial fibrillation which is controlled currently with Beta Blocker. Patient is currently in atrial fibrillation.[id number] Score: 1. HASBLED Score: >3 (high bleed risk). Anticoagulation indicated. Anticoagulation done with Eliquis. Type of A fib is unknown. All prior EKGs review, with none of them reflecting A fib.   - Discontinue BB 2/2 UDS positive for cocaine. Start Cardizem  mg.

## 2022-11-26 NOTE — PROCEDURES
"Joao Chi Sr is a 67 y.o. male patient.    Temp: (!) 92.8 °F (33.8 °C) (11/26/22 1615)  Pulse: 67 (11/26/22 1615)  Resp: 18 (11/26/22 1615)  BP: 113/76 (11/26/22 1615)  SpO2: 99 % (11/26/22 1615)  Weight: 51.8 kg (114 lb 3.2 oz) (11/26/22 1611)  Height: 5' 10" (177.8 cm) (11/26/22 1617)       Arterial Line    Date/Time: 11/26/2022 5:06 PM  Location procedure was performed: [facility] PULMONARY[facility]CRITICAL CARE  Performed by: SHENG Yepez  Authorized by: SHENG Yepez   Pre-op Diagnosis: post cardiac arrest  Post-operative diagnosis: post cardiac arrest  Consent Done: Emergent Situation  Preparation: Patient was prepped and draped in the usual sterile fashion.  Indications: multiple ABGs, respiratory failure and hemodynamic monitoring  Location: left radial  Ean's test normal: yes  Number of attempts: 1  Technical procedures used: area cleansed with chloraprep, drapped, and sterile technique was maintained the entire procedure  Complications: No  Specimens: No  Implants: No  Post-procedure: dressing applied  Post-procedure CMS: unchanged  Patient tolerance: Patient tolerated the procedure well with no immediate complications        11/26/2022    "

## 2022-11-26 NOTE — ASSESSMENT & PLAN NOTE
- Ddimer 6.26. Age-adjusted ddimer 690 ug/L, reflecting VTE unlikely.   - CTA showed no PE  - Venous doppler BLE pending

## 2022-11-26 NOTE — CONSULTS
[facility]  Cardiology  Consult Note    Patient Name: Joao Chi Sr  MRN: [id number]  Admission Date: 11/25/2022  Hospital Length of Stay: 1 days  Code Status: Full Code   Attending Provider: Radha Purvis MD   Consulting Provider: Harini Schwarz DO  Primary Care Physician: Cornelia Weiss MD  Principal Problem:Cardiac arrest    Patient information was obtained from past medical records, ER records and primary team.     Consults  Subjective:     Chief Complaint:  Cardiac arrest     HPI:   68 yo M presents to [facility] ED via EMS after cardiac arrest. History is per chart review. Pt was at a motel with a friend, who gave the hx. Pt had gotten up to take his medications then laid down and became unresponsive. EMS intubated, gave 0.4 Narcan, 3 epi and 1 bicarb and started chest compressions. ROSC was achieved as soon as EMS pulled up to the ED.     11/14/22: Pt had L inguinal hernia repair by Dr. Perez.   11/17/22: [facility] ED visit for L inguinal hernia repair site swelling, which was evaluated by Dr. Ferreira, surgeon on call.   11/23/22: PCP visit to Dr. Lopez. Influenza A treated with Dexamethasone injection and sent home with Prednisone 20 mg QD x 5 days, Tamiflu and Z veronique.     ED Course: NS x 2 L given. EKG showed A fib with  with inverted T waves in precordial leads. Ddimer 6.26 but age-adjusted 690 ug/L. Lactate 6.8. Targeted temperature management protocol initiated. Pt will be admitted to the ICU under the care of Dr. Parmjit Whaley, for close monitoring and evaluation of cardiac arrest.     Review of Systems   Unable to perform ROS: Intubated   Objective:      Vital Signs (Most Recent):  Temp: 96.2 °F (35.7 °C) (11/25/22 1835)  Pulse: (!) 137 (11/25/22 2030)  Resp: 18 (11/25/22 2020)  BP: (!) 116/92 (11/25/22 2030)  SpO2: (!) 91 % (11/25/22 1741)    Vital Signs (24h Range):  Temp:  [96.2 °F (35.7 °C)] 96.2 °F (35.7 °C)  Pulse:  [] 137  Resp:  [14-31] 18  SpO2:  [91 %-98 %]  91 %  BP: ()/(42-95) 116/92      Weight: 51.7 kg (114 lb)  Body mass index is 16.83 kg/m².     Physical Exam  Vitals and nursing note reviewed.   Constitutional:       General: He is in acute distress.      Appearance: He is ill-appearing and toxic-appearing. He is not diaphoretic.   HENT:      Head: Normocephalic and atraumatic.      Right Ear: External ear normal.      Left Ear: External ear normal.      Nose: Nose normal.   Eyes:      Comments: Pupils dilated but reactive to light   Cardiovascular:      Rate and Rhythm: Tachycardia present. Rhythm irregular.      Comments: Heart sounds difficult to auscultate 2/2 loud lung sounds while on mechanical ventilation.   Pulmonary:      Effort: Pt on mechanical ventilation.      Breath sounds: Wheezing and rhonchi present.      Comments: Pt is tachypnea.   Abdominal:      General: Bowel sounds are normal.      Palpations: Abdomen is soft.   Musculoskeletal:         General: No swelling.      Right lower leg: No edema.      Left lower leg: No edema.   Skin:     Comments: Extremities cool as pt is on targeted temperature therapy.    Neurological:      Comments: Pt intubated and sedated.    Psychiatric:      Comments: Pt intubated and sedated.      CRANIAL NERVES      CN III, IV, VI   Pupils: dilated  Right pupil: Reactivity: brisk.   Left pupil: Reactivity: brisk.      Significant Labs: All pertinent labs within the past 24 hours have been reviewed.     Significant Imaging: I have reviewed all pertinent imaging results/findings within the past 24 hours.    Assessment and Plan:     68 yo M presented after cardiac arrest. Cardiology consulted for cardiac arrest and elevated troponin. On arrival to bed, pt had become hypotensive and bradycardiac. Epi was given. Pulse was lost. ACLS was performed for 3 minutes until ROSC was achieved.     * Cardiac arrest  - Concern for cardiogenic shock  - Echo suggestive of biventricular failure  - Etiology likely severe RV failure  -  Primary team managing.   - LHC when stable and targeted temperature therapy completed. Likely Monday.       Biventricular heart failure with reduced left ventricular function  Patient is identified as having Systolic (HFrEF) heart failure that is Acute. CHF is currently uncontrolled due to Rales/crackles on pulmonary exam. Latest ECHO performed and demonstrates- Results for orders placed during the hospital encounter of 11/25/22    - Echo:Atrial fibrillation. LVEF 25%. Segmental LV wall abnormalities. Mamou, anterior and anteroseptal wall is akinetic (LAD infarct vs Takasubo). Moderate RVE with severely reduced RV systolic function. Severe LAURITA. Moderate to severe MR.   - Continue Furosemide and monitor clinical status closely. Monitor on telemetry. Patient is on CHF pathway.  Monitor strict Is&Os and daily weights.  Place on fluid restriction of 1.5 L. Continue to stress to patient importance of self efficacy and  on diet for CHF. Last BNP reviewed- 5,100.     - Dopamine and Levophed  - Lasix 60 mg Q12H  - No ACEi/ARB or BB 2/2 low BP requiring pressors.       Shock  - Undifferentiated shock likely cardiogenic as EF is 25% and presence of severe RV failure.   - Continue antibiotics x 48 hours. Discontinue antibiotic once blood Cx negative.     NSTEMI (non-ST elevated myocardial infarction)  - Echo: Abnormal wall abnormities. Takasubo vs LAD MI infarct  - ACS protocol  - LIONEL: 49 (intermediate/ high risk). Heart: 8 (high). Ana: 156 (25% probability of death from admission to 6 months).  - EKG: A fib with . T wave inversions in precordial leads.   - Troponin 334-->295-->375-->326  - Continue ASA, Atorvastatin 80 mg.   - No ACEi/ARB or BB 2/2 low BP requiring pressors. UDS also positive for cocaine.   - Heparin gtt.     Atrial fibrillation with RVR  - Patient with atrial fibrillation which is controlled currently   - [id number] Score: 1. HASBLED Score: >3 (high bleed risk)  - Hold Eliquis for likely LHC  on Monday. Heparin gtt.               VTE Risk Mitigation (From admission, onward)           Ordered     heparin 25,000 units in dextrose 5% 250 mL (100 units/mL) infusion LOW INTENSITY nomogram - [facility]  Continuous        Question:  Begin at (in units/kg/hr)  Answer:  12    11/26/22 1008     heparin 25,000 units in dextrose 5% (100 units/ml) IV bolus from bag ADDITIONAL PRN BOLUS - 60 units/kg (max bolus 4000 units)  As needed (PRN)        Question:  Heparin Infusion Adjustment (DO NOT MODIFY ANSWER)  Answer:  \\[employer]\epic\Images\Pharmacy\HeparinInfusions\heparin LOW INTENSITY nomogram for [place] [id number]    11/26/22 1008     heparin 25,000 units in dextrose 5% (100 units/ml) IV bolus from bag - ADDITIONAL PRN BOLUS - 30 units/kg (max bolus 4000 units)  As needed (PRN)        Question:  Heparin Infusion Adjustment (DO NOT MODIFY ANSWER)  Answer:  \\[facility]\epic\Images\Pharmacy\HeparinInfusions\heparin LOW INTENSITY nomogram for [place] [id number]    11/26/22 1008     IP VTE LOW RISK PATIENT  Once         11/25/22 2103     Place NATALIIA mckeone  Until discontinued         11/25/22 2103                    Thank you for your consult. I will follow-up with patient. Please contact us if you have any additional questions.    Harini Schwarz DO  Cardiology   [facility]

## 2022-11-26 NOTE — ASSESSMENT & PLAN NOTE
Possible etiologies include:   - Ischemic heart disease including ACS: Although troponin elevated, the likelihood of ACS is unknown as pt received prolonged chest compressions (20 min). EKG did not show ST deviations. Echo pending.   - Hypoxemia: Pt with COPDe and PNA 2/2 influenza A with superimposed bacterial infection , which cannot be ruled out.   - Valvular heart disease or Hypertrophic CM: Echo pending.   - Arrhythmia: EKG shows A fib with RVR.   - Prolonged QT: EKG with QTc 459.   - Cocaine-induced arrhythmias or MI: UDS cocaine positive     Unlikely etiologies include:  - Intracranial hemorrhage: Unlikely as CT head unremarkable   - PE: CTA showed no PE. Venous doppler of BLE pending  - Aortic dissection: No significant widened mediastinal on CXR.   - Acute pericardial tamponade: Not reflected on EKG or CTA.   - Pneumothorax: CXR blackman snot reflect pneumothorax.      Plan  - Mechanical ventilation   - Concern for anoxic brain injury as duration of arrest was approximately 20 minute before ROSC. Target temperature management protocol with target temperature of 36C.  - Cardiology consulted    11/26- TTM continues   - cardiology following   - dobutamine, levo, and versed infusion at present

## 2022-11-26 NOTE — ASSESSMENT & PLAN NOTE
Patient with Hypercapnic and Hypoxic Respiratory failure which is Acute.  Whether he is on home oxygen is unknown. Supplemental oxygen was provided and noted- Vent Mode: A/C  Oxygen Concentration (%):  [50] 50  Resp Rate Total:  [18 br/min-31 br/min] 18 br/min  Vt Set:  [400 mL-500 mL] 500 mL  PEEP/CPAP:  [5 cmH20] 5 cmH20.   Signs/symptoms of respiratory failure include- tachypnea and unresponsiveness. Contributing diagnoses includes - COPD, Pneumonia and cardiac arrest Labs and images were reviewed. Patient Has recent ABG, which has been reviewed. Will treat underlying causes and adjust management of respiratory failure as follows- mechanical ventilation, steroids, xopenex nebs and Atrovent Q8H.

## 2022-11-26 NOTE — CARE UPDATE
Tissue Reperfusion Exam preformed at [facility] on 11/25/22. Full physical assessment completed. VS stable at 115/85, , RR 24, SpO2 unable to be detected. SpO2 followed by ABG. Lungs with wheezing and rhonchi. Heart with tachycardic with irregular rhythm. Cardiac auscultation difficult with loud lungs sounds. Peripheral pulses +2 BUE. Skin cool. Lactate improving. No pressors required at this time.

## 2022-11-26 NOTE — ASSESSMENT & PLAN NOTE
- Etiology ACS vs prolonged chest compressions (20 min) vs type 2 MI 2/2 severe sepsis and MILY.   - LIONEL: 49 (intermediate/ high risk). Heart: 8 (high). Ana: 156 (25% probability of death from admission to 6 months).  - EKG: A fib with . T wave inversions in precordial leads. Repeat pending.   - Troponin 330-->295. Repeat pending.  - Hold home BB 2/2 UDS positive for cocaine. Start Cardizem  mg.  - Start ASA, Atorvastatin 80 mg. No ACEi/ARB 2/2 MILY and low BP.   - Echo pending  - Cardiology consulted.

## 2022-11-26 NOTE — ED NOTES
Pt is requesting a refill on her levostatin allergy pill that she takes 2 times daily if needed   She is also concerned because she keeps breaking out and would like to know if she should come into the office or not    Please advise, thank you Report called to ICU RN

## 2022-11-26 NOTE — ASSESSMENT & PLAN NOTE
Possible etiologies include:   - Ischemic heart disease including ACS: Although troponin elevated, the likelihood of ACS is unknown as pt received prolonged chest compressions (20 min). EKG did not show ST deviations. Echo pending.   - Hypoxemia: Pt with COPDe and PNA 2/2 influenza A with superimposed bacterial infection , which cannot be ruled out.   - Valvular heart disease or Hypertrophic CM: Echo pending.   - Arrhythmia: EKG shows A fib with RVR.   - Prolonged QT: EKG with QTc 459.   - Cocaine-induced arrhythmias or MI: UDS cocaine positive    Unlikely etiologies include:  - Intracranial hemorrhage: Unlikely as CT head unremarkable   - PE: CTA showed no PE. Venous doppler of BLE pending  - Aortic dissection: No significant widened mediastinal on CXR.   - Acute pericardial tamponade: Not reflected on EKG or CTA.   - Pneumothorax: CXR blackman snot reflect pneumothorax.     Plan  - Mechanical ventilation   - Concern for anoxic brain injury as duration of arrest was approximately 20 minute before ROSC. Target temperature management protocol with target temperature of 36C.  - Cardiology consulted

## 2022-11-26 NOTE — SUBJECTIVE & OBJECTIVE
Past Medical History:   Diagnosis Date    A-fib     Colitis     COPD (chronic obstructive pulmonary disease)     High cholesterol     Hypertension        Past Surgical History:   Procedure Laterality Date    BACK SURGERY      COLONOSCOPY N/A 5/22/2018    Procedure: COLONOSCOPY;  Surgeon: Sudhakar Herrera MD;  Location: [facility];  Service: Endoscopy;  Laterality: N/A;    OLECRANON BURSECTOMY Left 8/18/2022    Procedure: BURSECTOMY, OLECRANON;  Surgeon: Santiago Roman MD;  Location: [facility];  Service: Orthopedics;  Laterality: Left;    WRIST SURGERY         Review of patient's allergies indicates:  No Known Allergies    No current facility-administered medications on file prior to encounter.     Current Outpatient Medications on File Prior to Encounter   Medication Sig    albuterol (PROVENTIL/VENTOLIN HFA) 90 mcg/actuation inhaler Inhale 2 puffs into the lungs every 6 (six) hours as needed for Wheezing. Rescue    amLODIPine (NORVASC) 10 MG tablet Take 1 tablet (10 mg total) by mouth once daily.    apixaban (ELIQUIS) 5 mg Tab Take 5 mg by mouth 2 (two) times daily. 1 po bid    azithromycin (Z-ALEX) 250 MG tablet Take 2 tablets by mouth on day 1; Take 1 tablet by mouth on days 2-5    DULoxetine (CYMBALTA) 60 MG capsule Take 1 capsule (60 mg total) by mouth once daily. (Patient not taking: Reported on 11/1/2022)    ergocalciferol (ERGOCALCIFEROL) 50,000 unit Cap Take 1 capsule (50,000 Units total) by mouth every 7 days. Take once weekly for 12 weeks.    fluticasone-umeclidin-vilanter (TRELEGY ELLIPTA) 100-62.5-25 mcg DsDv Inhale 1 puff into the lungs once daily.    gabapentin (NEURONTIN) 300 MG capsule Take 300 mg by mouth 2 (two) times daily.    metoprolol tartrate (LOPRESSOR) 25 MG tablet Take 25 mg by mouth 2 (two) times daily.    oseltamivir (TAMIFLU) 75 MG capsule Take 1 capsule (75 mg total) by mouth 2 (two) times daily. for 5 days    predniSONE (DELTASONE) 20 MG tablet Take 1 tablet (20 mg  total) by mouth once daily. for 5 days    sildenafil (VIAGRA) 100 MG tablet Take 1 tablet (100 mg total) by mouth daily as needed for Erectile Dysfunction. (Patient not taking: Reported on 11/1/2022)    tiotropium (SPIRIVA WITH HANDIHALER) 18 mcg inhalation capsule Inhale 1 capsule (18 mcg total) into the lungs once daily. Controller (Patient not taking: Reported on 11/1/2022)    [DISCONTINUED] lovastatin (MEVACOR) 40 MG tablet Take 1 tablet (40 mg total) by mouth every evening.    [DISCONTINUED] RABEprazole (ACIPHEX) 20 mg tablet Take 1 tablet (20 mg total) by mouth once daily.     Family History       Problem Relation (Age of Onset)    Heart failure Mother    No Known Problems Father, Sister, Brother, Brother          Tobacco Use    Smoking status: Every Day     Packs/day: 1.00     Years: 40.00     Pack years: 40.00     Types: Cigarettes    Smokeless tobacco: Never   Substance and Sexual Activity    Alcohol use: Yes     Alcohol/week: 1.0 standard drink     Types: 1 Shots of liquor per week     Comment: Occ    Drug use: Yes     Types: Cocaine     Comment: 2wees ago    Sexual activity: Not on file     Review of Systems   Unable to perform ROS: Intubated   Objective:     Vital Signs (Most Recent):  Temp: 96.2 °F (35.7 °C) (11/25/22 1835)  Pulse: (!) 137 (11/25/22 2030)  Resp: 18 (11/25/22 2020)  BP: (!) 116/92 (11/25/22 2030)  SpO2: (!) 91 % (11/25/22 1741)   Vital Signs (24h Range):  Temp:  [96.2 °F (35.7 °C)] 96.2 °F (35.7 °C)  Pulse:  [] 137  Resp:  [14-31] 18  SpO2:  [91 %-98 %] 91 %  BP: ()/(42-95) 116/92     Weight: 51.7 kg (114 lb)  Body mass index is 16.83 kg/m².    Physical Exam  Vitals and nursing note reviewed.   Constitutional:       General: He is in acute distress.      Appearance: He is ill-appearing and toxic-appearing. He is not diaphoretic.   HENT:      Head: Normocephalic and atraumatic.      Right Ear: External ear normal.      Left Ear: External ear normal.      Nose: Nose normal.    Eyes:      Comments: Pupils dilated but reactive to light   Cardiovascular:      Rate and Rhythm: Tachycardia present. Rhythm irregular.      Comments: Heart sounds difficult to auscultate 2/2 loud lung sounds while on mechanical ventilation.   Pulmonary:      Effort: Respiratory distress present.      Breath sounds: Wheezing and rhonchi present.      Comments: Pt is tachypnea, breathing over ventilation.   Abdominal:      General: Bowel sounds are normal.      Palpations: Abdomen is soft.   Musculoskeletal:         General: No swelling.      Right lower leg: No edema.      Left lower leg: No edema.   Skin:     Comments: Extremities cool   Neurological:      Comments: Pt intubated and sedated.    Psychiatric:      Comments: Pt intubated and sedated.            CRANIAL NERVES     CN III, IV, VI   Pupils: dilated  Right pupil: Reactivity: brisk.   Left pupil: Reactivity: brisk.      Significant Labs: All pertinent labs within the past 24 hours have been reviewed.    Significant Imaging: I have reviewed all pertinent imaging results/findings within the past 24 hours.

## 2022-11-26 NOTE — ASSESSMENT & PLAN NOTE
Patient with Hypercapnic and Hypoxic Respiratory failure which is Acute.  Whether he is on home oxygen is unknown. Supplemental oxygen was provided and noted- Vent Mode: A/C  Oxygen Concentration (%):  [50] 50  Resp Rate Total:  [18 br/min-31 br/min] 18 br/min  Vt Set:  [400 mL-500 mL] 500 mL  PEEP/CPAP:  [5 cmH20] 5 cmH20.   Signs/symptoms of respiratory failure include- tachypnea and unresponsiveness. Contributing diagnoses includes - COPD, Pneumonia and cardiac arrest Labs and images were reviewed. Patient Has recent ABG, which has been reviewed. Will treat underlying causes and adjust management of respiratory failure as follows- mechanical ventilation, steroids, xopenex nebs and Atrovent Q8H.   11/26- post code abgs stable; wean FiO2 to maintain sats >90

## 2022-11-26 NOTE — HPI
66 yo M presents to [facility] ED via EMS after cardiac arrest. History is per chart review. Pt was at a motel with a friend, who gave the hx. Pt had gotten up to take his medications then laid down and became unresponsive. EMS intubated, gave 0.4 Narcan, 3 epi and 1 bicarb and started chest compressions. ROSC was achieved as soon as EMS pulled up to the ED.     11/14/22: Pt had L inguinal hernia repair by Dr. Perez.   11/17/22: [facility] ED visit for L inguinal hernia repair site swelling, which was evaluated by Dr. Ferreira, surgeon on call.   11/23/22: PCP visit to Dr. Lopez. Influenza A treated with Dexamethasone injection and sent home with Prednisone 20 mg QD x 5 days, Tamiflu and Z veronique.     ED Course: NS x 2 L given. EKG showed A fib with  with inverted T waves in precordial leads. Ddimer 6.26 but age-adjusted 690 ug/L. Lactate 6.8. Targeted temperature management protocol initiated. Pt will be admitted to the ICU under the care of Dr. Parmjit Whaley, for close monitoring and evaluation of cardiac arrest.

## 2022-11-26 NOTE — ASSESSMENT & PLAN NOTE
This patient does have evidence of infective focus  My overall impression is sepsis. Vital signs were reviewed and noted in progress note.  Antibiotics given-   Antibiotics (From admission, onward)    Start     Stop Route Frequency Ordered    11/26/22 0200  piperacillin-tazobactam (ZOSYN) 4.5 g in dextrose 5 % in water (D5W) 5 % 100 mL IVPB (MB+)  (piperacillin/tazobactam IVPB in adult patients)         -- IV Every 8 hours (non-standard times) 11/25/22 2148 11/25/22 2115  mupirocin 2 % ointment         11/30 2059 Nasl 2 times daily 11/25/22 2008        Cultures were taken-   Microbiology Results (last 7 days)     Procedure Component Value Units Date/Time    Culture, Lower Respiratory [[id number]]     Order Status: Sent Specimen: Respiratory from Tracheal Aspirate     Blood culture [[id number]]     Order Status: Sent Specimen: Blood     Blood culture [[id number]]     Order Status: Sent Specimen: Blood         Latest lactate reviewed, they are-  Recent Labs   Lab 11/25/22 1827 11/25/22 2037   LACTATE 6.8* 2.7*       Organ dysfunction indicated by Acute kidney injury, Acute respiratory failure, Acute liver injury and Lactate 6.8 then 2.7  Source- CAP in setting of Influenza A. Cannot rule out superimposed bacterial infection.     Source control Achieved by- droplet precautions.   - Vanc and Zosyn and Tamiflu

## 2022-11-26 NOTE — ASSESSMENT & PLAN NOTE
- Patient with atrial fibrillation which is controlled currently   - [id number] Score: 1. HASBLED Score: >3 (high bleed risk)  - Hold Eliquis for likely LHC on Monday. Heparin gtt.

## 2022-11-26 NOTE — PLAN OF CARE
Problem: Adult Inpatient Plan of Care  Goal: Plan of Care Review  Outcome: Ongoing, Progressing  Goal: Patient-Specific Goal (Individualized)  Outcome: Ongoing, Progressing  Goal: Absence of Hospital-Acquired Illness or Injury  Outcome: Ongoing, Progressing  Intervention: Identify and Manage Fall Risk  Flowsheets (Taken 11/26/2022 0553)  Safety Promotion/Fall Prevention:   pulse ox   bed alarm set  Intervention: Prevent Skin Injury  Flowsheets (Taken 11/26/2022 0553)  Body Position:   turned   weight shifting  Skin Protection:   skin-to-device areas padded   skin-to-skin areas padded  Goal: Optimal Comfort and Wellbeing  Outcome: Ongoing, Progressing  Intervention: Monitor Pain and Promote Comfort  Flowsheets (Taken 11/26/2022 0553)  Pain Management Interventions:   position adjusted   pillow support provided     Problem: Communication Impairment (Mechanical Ventilation, Invasive)  Goal: Effective Communication  Outcome: Ongoing, Progressing     Problem: Device-Related Complication Risk (Mechanical Ventilation, Invasive)  Goal: Optimal Device Function  Outcome: Ongoing, Progressing  Intervention: Optimize Device Care and Function  Flowsheets (Taken 11/26/2022 0553)  Aspiration Precautions:   upright posture maintained   respiratory status monitored   oral hygiene care promoted  Airway Safety Measures: suction at bedside     Problem: Inability to Wean (Mechanical Ventilation, Invasive)  Goal: Mechanical Ventilation Liberation  Outcome: Ongoing, Progressing     Problem: Communication Impairment (Artificial Airway)  Goal: Effective Communication  Outcome: Ongoing, Progressing     Problem: Adjustment to Illness (Sepsis/Septic Shock)  Goal: Optimal Coping  Outcome: Ongoing, Progressing  Intervention: Optimize Psychosocial Adjustment to Illness  Flowsheets (Taken 11/26/2022 0553)  Family/Support System Care: support provided     Problem: Bleeding (Sepsis/Septic Shock)  Goal: Absence of Bleeding  Outcome: Ongoing,  Progressing  Intervention: Monitor and Manage Bleeding  Flowsheets (Taken 11/26/2022 0553)  Bleeding Precautions: blood pressure closely monitored     Problem: Infection Progression (Sepsis/Septic Shock)  Goal: Absence of Infection Signs and Symptoms  Outcome: Ongoing, Progressing  Intervention: Promote Stabilization  Flowsheets (Taken 11/26/2022 0553)  Lung Protection Measures:   optimal PEEP applied   lung compliance monitored

## 2022-11-26 NOTE — PROGRESS NOTES
[facility]  Pulmonology  Progress Note    Patient Name: Joao Chi Sr  MRN: [id number]  Admission Date: 11/25/2022  Hospital Length of Stay: 1 days  Code Status: Full Code  Attending Provider: Radha Purvis MD  Primary Care Provider: Cornelia Weiss MD   Principal Problem: Cardiac arrest    Subjective:     Interval History: Intubated and sedated. On TTM, breathing over ventilator at present.     Hospital Course:  11/26-- s/p cardiac arrest on TTM; underwent second cardiac arrest today with ROSC-- unable to get in touch with family?? Questionable relation to patient; concern for cardiogenic shock given EF 25%- continue with dobutamine and levo; TTM protocol ongoing; cardiology following         Objective:     Vital Signs (Most Recent):  Temp: (!) 93.2 °F (34 °C) (11/26/22 1230)  Pulse: (!) 125 (11/26/22 1230)  Resp: (!) 23 (11/26/22 1230)  BP: (!) 156/111 (11/26/22 1230)  SpO2: (!) 90 % (11/26/22 1230)   Vital Signs (24h Range):  Temp:  [92.5 °F (33.6 °C)-96.4 °F (35.8 °C)] 93.2 °F (34 °C)  Pulse:  [] 125  Resp:  [9-36] 23  SpO2:  [67 %-100 %] 90 %  BP: ()/() 156/111     Weight: 51.8 kg (114 lb 3.2 oz)  Body mass index is 16.86 kg/m².      Intake/Output Summary (Last 24 hours) at 11/26/2022 1504  Last data filed at 11/26/2022 1300  Gross per 24 hour   Intake 1310.92 ml   Output 330 ml   Net 980.92 ml       Physical Exam  Vitals and nursing note reviewed.   Constitutional:       General: He is not in acute distress.     Appearance: He is ill-appearing. He is not diaphoretic.   HENT:      Head: Normocephalic and atraumatic.      Right Ear: External ear normal.      Left Ear: External ear normal.      Nose: Nose normal.   Eyes:      Pupils: Pupils are equal, round, and reactive to light.   Cardiovascular:      Rate and Rhythm: Tachycardia present. Rhythm irregular.   Abdominal:      General: Bowel sounds are normal. There is no distension.      Palpations: Abdomen is soft.    Musculoskeletal:         General: No swelling.      Right lower leg: No edema.      Left lower leg: No edema.   Skin:     General: Skin is cool and dry.      Capillary Refill: Capillary refill takes more than 3 seconds.      Comments: TTM ogoing  L groin incision site    Neurological:      Comments: Intubated and sedated    Psychiatric:      Comments: Intubated and sedated          Vents:  Vent Mode: A/C (11/26/22 1115)  Ventilator Initiated: Yes (11/25/22 1730)  Set Rate: 18 BPM (11/26/22 1115)  Vt Set: 550 mL (11/26/22 1115)  PEEP/CPAP: 5 cmH20 (11/26/22 1115)  Oxygen Concentration (%): 50 (11/26/22 1115)  Peak Airway Pressure: 21 cmH20 (11/26/22 1115)  Total Ve: 9.3 L/m (11/26/22 1115)  F/VT Ratio<105 (RSBI): (!) 35.85 (11/26/22 1115)    Lines/Drains/Airways       Drain  Duration                  NG/OG Tube 11/25/22 2330 Center mouth <1 day         Urethral Catheter 11/25/22 1842 Temperature probe 16 Fr. <1 day              Airway  Duration                  Airway - Non-Surgical 11/25/22 1 day              Peripheral Intravenous Line  Duration                  External Jugular IV 11/25/22 1834 <1 day         Peripheral IV - Single Lumen 11/25/22 1726 18 G Anterior;Right  <1 day         Peripheral IV - Single Lumen 11/25/22 1735 16 G Anterior;Left Forearm <1 day                    Significant Labs:    CBC/Anemia Profile:  Recent Labs   Lab 11/25/22 1739 11/26/22  0109 11/26/22  0440   WBC 7.57  --  9.74   HGB 13.9  --  12.9*   HCT 45.1 45 41.2   *  --  217   MCV 95.1  --  93.0   RDW 14.1  --  14.5        Chemistries:  Recent Labs   Lab 11/25/22 1739 11/25/22  2133 11/26/22  0016 11/26/22  0827    139 139 137   K 5.0 5.6* 5.6* 4.2   CL 99 103 101 102   CO2 25 30 30 29   BUN 26* 28* 28* 33*   CREATININE 1.34* 1.46* 1.45* 1.32*   CALCIUM 8.3* 7.9* 8.3* 8.1*   ALBUMIN 2.6* 2.4*  --   --    PROT 6.1* 6.2*  --   --    BILITOT 0.4 0.7  --   --    ALKPHOS 87 107  --   --    ALT 85* 122*  --   --    AST  156* 231*  --   --    MG 2.2  --  2.4* 4.6*   PHOS  --   --  6.1* 5.4*       All pertinent labs within the past 24 hours have been reviewed.    Significant Imaging:  I have reviewed all pertinent imaging results/findings within the past 24 hours.    Assessment/Plan:     * Cardiac arrest  Possible etiologies include:   - Ischemic heart disease including ACS: Although troponin elevated, the likelihood of ACS is unknown as pt received prolonged chest compressions (20 min). EKG did not show ST deviations. Echo pending.   - Hypoxemia: Pt with COPDe and PNA 2/2 influenza A with superimposed bacterial infection , which cannot be ruled out.   - Valvular heart disease or Hypertrophic CM: Echo pending.   - Arrhythmia: EKG shows A fib with RVR.   - Prolonged QT: EKG with QTc 459.   - Cocaine-induced arrhythmias or MI: UDS cocaine positive     Unlikely etiologies include:  - Intracranial hemorrhage: Unlikely as CT head unremarkable   - PE: CTA showed no PE. Venous doppler of BLE pending  - Aortic dissection: No significant widened mediastinal on CXR.   - Acute pericardial tamponade: Not reflected on EKG or CTA.   - Pneumothorax: CXR blackman snot reflect pneumothorax.      Plan  - Mechanical ventilation   - Concern for anoxic brain injury as duration of arrest was approximately 20 minute before ROSC. Target temperature management protocol with target temperature of 36C.  - Cardiology consulted    11/26- TTM continues   - cardiology following   - dobutamine, levo, and versed infusion at present     Shock  - Undifferentiated shock likely cardiogenic as EF is 25% and presence of severe RV failure.   - Continue antibiotics x 48 hours. Discontinue antibiotic once blood Cx negative.  - dobutamine and levophed infusion     D-dimer, elevated  - Ddimer 6.26. Age-adjusted ddimer 0.69 ug/mL, reflecting VTE possible.   - CTA showed no PE  - Venous doppler BLE negative     Atrial fibrillation with RVR  Patient with atrial fibrillation which is  controlled currently with Beta Blocker. Patient is currently in atrial fibrillation.[id number] Score: 1. HASBLED Score: >3 (high bleed risk). Anticoagulation indicated. Anticoagulation done with Eliquis. Type of A fib is unknown. All prior EKGs review, with none of them reflecting A fib.   - Discontinue BB 2/2 UDS positive for cocaine. Start Cardizem  mg.    11/26  - after discussion with Dr Purvis-- d/c cardizem- digoxin 250mcg x 1 with amiodarone infusion protocol       NSTEMI (non-ST elevated myocardial infarction)  Etiology ACS vs prolonged chest compressions (20 min) vs type 2 MI 2/2 severe sepsis and MILY.   - LIONEL: 49 (intermediate/ high risk). Heart: 8 (high). Ana: 156 (25% probability of death from admission to 6 months).  - EKG: A fib with . T wave inversions in precordial leads. Repeat pending.   - Troponin 330-->295. Repeat pending.  - Hold home BB 2/2 UDS positive for cocaine. Start Cardizem  mg.  - Start ASA, Atorvastatin 80 mg. No ACEi/ARB 2/2 MILY and low BP.   - Echo with EF 25%  - heparin infusion   - Cardiology consulted.     COPD exacerbation  - continue current meds     Pneumonia due to influenza A virus  - PSI: 129 (Risk Class IV, 8.2-9.3% mortality)  - Tested positive for Influenza A on 11/23/22.  - CXR: possible R lung pneumonitis.   - CTA: No PE, possible R heart failure and possible pneumonitis.   - Tamiflu, Vanc and Zosyn.   - Xopenex Q8H and Q4H prn in the setting of A fib with RVR. Atrovent Q8H. Solumedrol 40 mg Q6H.   - Blood and sputum Cx pending  - Mechanical ventilation        MILY (acute kidney injury)  Patient with acute kidney injury likely due to severe sepsis MILY is currently just diagnosed. Labs reviewed- Renal function/electrolytes with Estimated Creatinine Clearance: 35.2 mL/min (A) (based on SCr of 1.45 mg/dL (H)). according to latest data. Monitor urine output and serial BMP and adjust therapy as needed. Avoid nephrotoxins and renally dose meds for GFR  listed above.      - Baseline Cr 0.84  - No IVF as CTA shows possible right heart failure.  11/26- on dobutamine infusion; Cr remains stable at present; UOP mediocre     Severe sepsis  This patient does have evidence of infective focus  My overall impression is sepsis. Vital signs were reviewed and noted in progress note.  Antibiotics given-   Antibiotics (From admission, onward)        Start     Stop Route Frequency Ordered     11/26/22 0200   piperacillin-tazobactam (ZOSYN) 4.5 g in dextrose 5 % in water (D5W) 5 % 100 mL IVPB (MB+)  (piperacillin/tazobactam IVPB in adult patients)         -- IV Every 8 hours (non-standard times) 11/25/22 2148     11/26/22 0012   vancomycin - pharmacy to dose         -- IV pharmacy to manage frequency 11/25/22 2312 11/26/22 0000   vancomycin (VANCOCIN) 1,000 mg in dextrose 5 % 250 mL IVPB         -- IV Every 24 hours (non-standard times) 11/25/22 2312 11/25/22 2115   mupirocin 2 % ointment         11/30 2059 Nasl 2 times daily 11/25/22 2008             Cultures were taken-   Microbiology Results (last 7 days)         Procedure Component Value Units Date/Time     Culture, Lower Respiratory [[id number]] Collected: 11/25/22 2348     Order Status: Resulted Specimen: Respiratory from Tracheal Aspirate Updated: 11/25/22 2354     Blood culture [[id number]] Collected: 11/25/22 2220     Order Status: Sent Specimen: Blood Updated: 11/25/22 2227     Blood culture [[id number]] Collected: 11/25/22 2214     Order Status: Sent Specimen: Blood Updated: 11/25/22 2227             Latest lactate reviewed, they are-       Recent Labs   Lab 11/25/22 1827 11/25/22 2037   LACTATE 6.8* 2.7*         Organ dysfunction indicated by Acute kidney injury, Acute respiratory failure, Acute liver injury and Lactate 6.8 then 2.7  Source- CAP in setting of Influenza A. Cannot rule out superimposed bacterial infection.      Source control Achieved by- droplet precautions.   - Vanc and Zosyn and  Tamiflu    11/26- EF 25%; concern more shock related at present  - follow cultures- if remain negative will d/c abx     Acute respiratory failure with hypoxia and hypercarbia  Patient with Hypercapnic and Hypoxic Respiratory failure which is Acute.  Whether he is on home oxygen is unknown. Supplemental oxygen was provided and noted- Vent Mode: A/C  Oxygen Concentration (%):  [50] 50  Resp Rate Total:  [18 br/min-31 br/min] 18 br/min  Vt Set:  [400 mL-500 mL] 500 mL  PEEP/CPAP:  [5 cmH20] 5 cmH20.   Signs/symptoms of respiratory failure include- tachypnea and unresponsiveness. Contributing diagnoses includes - COPD, Pneumonia and cardiac arrest Labs and images were reviewed. Patient Has recent ABG, which has been reviewed. Will treat underlying causes and adjust management of respiratory failure as follows- mechanical ventilation, steroids, xopenex nebs and Atrovent Q8H.   11/26- post code abgs stable; wean FiO2 to maintain sats >90     Hernia of abdominal wall  - s/p repair on 11/14     Drug abuse  - UDS positive for cocaine and benzos     HLD (hyperlipidemia)  - statin     HTN (hypertension)  - currently hypotensive on vasopressors         This includes approximately 32 minutes of critical care time- evaluating, managing, and providing care to a critically ill patient- including review of labs and films, review of medications, and bedside management of patient.        GOMEZ Yepez-[facility]  Pulmonology  [facility]

## 2022-11-26 NOTE — H&P
[facility] Hospital Medicine  History & Physical    Patient Name: Joao Chi Sr  MRN: [id number]  Patient Class: IP- Inpatient  Admission Date: 11/25/2022  Attending Physician: Parmjit Whaley MD   Primary Care Provider: Cornelia Weiss MD         Patient information was obtained from EMS personnel, past medical records and ER records.     Subjective:     Principal Problem:Cardiopulmonary arrest    Chief Complaint:   Chief Complaint   Patient presents with    Cardiac Arrest        HPI: 66 yo M presents to [facility] ED via EMS after cardiac arrest. History is per chart review. Pt was at a motel with a friend, who gave the hx. Pt had gotten up to take his medications then laid down and became unresponsive. EMS intubated, gave 0.4 Narcan, 3 epi and 1 bicarb and started chest compressions. ROSC was achieved as soon as EMS pulled up to the ED.     11/14/22: Pt had L inguinal hernia repair by Dr. Perez.   11/17/22: [facility] ED visit for L inguinal hernia repair site swelling, which was evaluated by Dr. Ferreira, surgeon on call.   11/23/22: PCP visit to Dr. Lopez. Influenza A treated with Dexamethasone injection and sent home with Prednisone 20 mg QD x 5 days, Tamiflu and Z veronique.     ED Course: NS x 2 L given. EKG showed A fib with  with inverted T waves in precordial leads. Ddimer 6.26 but age-adjusted 690 ug/L. Lactate 6.8. Targeted temperature management protocol initiated. Pt will be admitted to the ICU under the care of Dr. Parmjit Whaley, for close monitoring and evaluation of cardiac arrest.       Past Medical History:   Diagnosis Date    A-fib     Colitis     COPD (chronic obstructive pulmonary disease)     High cholesterol     Hypertension        Past Surgical History:   Procedure Laterality Date    BACK SURGERY      COLONOSCOPY N/A 5/22/2018    Procedure: COLONOSCOPY;  Surgeon: Sudhakar Herrera MD;  Location: [facility];  Service: Endoscopy;  Laterality: N/A;     OLECRANON BURSECTOMY Left 8/18/2022    Procedure: BURSECTOMY, OLECRANON;  Surgeon: Santiago Roman MD;  Location: [facility];  Service: Orthopedics;  Laterality: Left;    WRIST SURGERY         Review of patient's allergies indicates:  No Known Allergies    No current facility-administered medications on file prior to encounter.     Current Outpatient Medications on File Prior to Encounter   Medication Sig    albuterol (PROVENTIL/VENTOLIN HFA) 90 mcg/actuation inhaler Inhale 2 puffs into the lungs every 6 (six) hours as needed for Wheezing. Rescue    amLODIPine (NORVASC) 10 MG tablet Take 1 tablet (10 mg total) by mouth once daily.    apixaban (ELIQUIS) 5 mg Tab Take 5 mg by mouth 2 (two) times daily. 1 po bid    azithromycin (Z-ALEX) 250 MG tablet Take 2 tablets by mouth on day 1; Take 1 tablet by mouth on days 2-5    DULoxetine (CYMBALTA) 60 MG capsule Take 1 capsule (60 mg total) by mouth once daily. (Patient not taking: Reported on 11/1/2022)    ergocalciferol (ERGOCALCIFEROL) 50,000 unit Cap Take 1 capsule (50,000 Units total) by mouth every 7 days. Take once weekly for 12 weeks.    fluticasone-umeclidin-vilanter (TRELEGY ELLIPTA) 100-62.5-25 mcg DsDv Inhale 1 puff into the lungs once daily.    gabapentin (NEURONTIN) 300 MG capsule Take 300 mg by mouth 2 (two) times daily.    metoprolol tartrate (LOPRESSOR) 25 MG tablet Take 25 mg by mouth 2 (two) times daily.    oseltamivir (TAMIFLU) 75 MG capsule Take 1 capsule (75 mg total) by mouth 2 (two) times daily. for 5 days    predniSONE (DELTASONE) 20 MG tablet Take 1 tablet (20 mg total) by mouth once daily. for 5 days    sildenafil (VIAGRA) 100 MG tablet Take 1 tablet (100 mg total) by mouth daily as needed for Erectile Dysfunction. (Patient not taking: Reported on 11/1/2022)    tiotropium (SPIRIVA WITH HANDIHALER) 18 mcg inhalation capsule Inhale 1 capsule (18 mcg total) into the lungs once daily. Controller (Patient not taking: Reported on 11/1/2022)     [DISCONTINUED] lovastatin (MEVACOR) 40 MG tablet Take 1 tablet (40 mg total) by mouth every evening.    [DISCONTINUED] RABEprazole (ACIPHEX) 20 mg tablet Take 1 tablet (20 mg total) by mouth once daily.     Family History       Problem Relation (Age of Onset)    Heart failure Mother    No Known Problems Father, Sister, Brother, Brother          Tobacco Use    Smoking status: Every Day     Packs/day: 1.00     Years: 40.00     Pack years: 40.00     Types: Cigarettes    Smokeless tobacco: Never   Substance and Sexual Activity    Alcohol use: Yes     Alcohol/week: 1.0 standard drink     Types: 1 Shots of liquor per week     Comment: Occ    Drug use: Yes     Types: Cocaine     Comment: 2wees ago    Sexual activity: Not on file     Review of Systems   Unable to perform ROS: Intubated   Objective:     Vital Signs (Most Recent):  Temp: 96.2 °F (35.7 °C) (11/25/22 1835)  Pulse: (!) 137 (11/25/22 2030)  Resp: 18 (11/25/22 2020)  BP: (!) 116/92 (11/25/22 2030)  SpO2: (!) 91 % (11/25/22 1741)   Vital Signs (24h Range):  Temp:  [96.2 °F (35.7 °C)] 96.2 °F (35.7 °C)  Pulse:  [] 137  Resp:  [14-31] 18  SpO2:  [91 %-98 %] 91 %  BP: ()/(42-95) 116/92     Weight: 51.7 kg (114 lb)  Body mass index is 16.83 kg/m².    Physical Exam  Vitals and nursing note reviewed.   Constitutional:       General: He is in acute distress.      Appearance: He is ill-appearing and toxic-appearing. He is not diaphoretic.   HENT:      Head: Normocephalic and atraumatic.      Right Ear: External ear normal.      Left Ear: External ear normal.      Nose: Nose normal.   Eyes:      Comments: Pupils dilated but reactive to light   Cardiovascular:      Rate and Rhythm: Tachycardia present. Rhythm irregular.      Comments: Heart sounds difficult to auscultate 2/2 loud lung sounds while on mechanical ventilation.   Pulmonary:      Effort: Respiratory distress present.      Breath sounds: Wheezing and rhonchi present.      Comments: Pt is tachypnea,  breathing over ventilation.   Abdominal:      General: Bowel sounds are normal.      Palpations: Abdomen is soft.   Musculoskeletal:         General: No swelling.      Right lower leg: No edema.      Left lower leg: No edema.   Skin:     Comments: Extremities cool   Neurological:      Comments: Pt intubated and sedated.    Psychiatric:      Comments: Pt intubated and sedated.            CRANIAL NERVES     CN III, IV, VI   Pupils: dilated  Right pupil: Reactivity: brisk.   Left pupil: Reactivity: brisk.      Significant Labs: All pertinent labs within the past 24 hours have been reviewed.    Significant Imaging: I have reviewed all pertinent imaging results/findings within the past 24 hours.    Assessment/Plan:     * Cardiac arrest  Possible etiologies include:   - Ischemic heart disease including ACS: Although troponin elevated, the likelihood of ACS is unknown as pt received prolonged chest compressions (20 min). EKG did not show ST deviations. Echo pending.   - Hypoxemia: Pt with COPDe and PNA 2/2 influenza A with superimposed bacterial infection , which cannot be ruled out.   - Valvular heart disease or Hypertrophic CM: Echo pending.   - Arrhythmia: EKG shows A fib with RVR.   - Prolonged QT: EKG with QTc 459.   - Cocaine-induced arrhythmias or MI: UDS cocaine positive    Unlikely etiologies include:  - Intracranial hemorrhage: Unlikely as CT head unremarkable   - PE: CTA showed no PE. Venous doppler of BLE pending  - Aortic dissection: No significant widened mediastinal on CXR.   - Acute pericardial tamponade: Not reflected on EKG or CTA.   - Pneumothorax: CXR blackman snot reflect pneumothorax.     Plan  - Mechanical ventilation   - Concern for anoxic brain injury as duration of arrest was approximately 20 minute before ROSC. Target temperature management protocol with target temperature of 36C.  - Cardiology consulted    Acute respiratory failure with hypoxia and hypercarbia  Patient with Hypercapnic and Hypoxic  Respiratory failure which is Acute.  Whether he is on home oxygen is unknown. Supplemental oxygen was provided and noted- Vent Mode: A/C  Oxygen Concentration (%):  [50] 50  Resp Rate Total:  [18 br/min-31 br/min] 18 br/min  Vt Set:  [400 mL-500 mL] 500 mL  PEEP/CPAP:  [5 cmH20] 5 cmH20.   Signs/symptoms of respiratory failure include- tachypnea and unresponsiveness. Contributing diagnoses includes - COPD, Pneumonia and cardiac arrest Labs and images were reviewed. Patient Has recent ABG, which has been reviewed. Will treat underlying causes and adjust management of respiratory failure as follows- mechanical ventilation, steroids, xopenex nebs and Atrovent Q8H.     Severe sepsis  This patient does have evidence of infective focus  My overall impression is sepsis. Vital signs were reviewed and noted in progress note.  Antibiotics given-   Antibiotics (From admission, onward)      Start     Stop Route Frequency Ordered    11/26/22 0200  piperacillin-tazobactam (ZOSYN) 4.5 g in dextrose 5 % in water (D5W) 5 % 100 mL IVPB (MB+)  (piperacillin/tazobactam IVPB in adult patients)         -- IV Every 8 hours (non-standard times) 11/25/22 2148    11/26/22 0012  vancomycin - pharmacy to dose         -- IV pharmacy to manage frequency 11/25/22 2312 11/26/22 0000  vancomycin (VANCOCIN) 1,000 mg in dextrose 5 % 250 mL IVPB         -- IV Every 24 hours (non-standard times) 11/25/22 2312 11/25/22 2115  mupirocin 2 % ointment         11/30 2059 Nasl 2 times daily 11/25/22 2008          Cultures were taken-   Microbiology Results (last 7 days)       Procedure Component Value Units Date/Time    Culture, Lower Respiratory [[id number]] Collected: 11/25/22 2348    Order Status: Resulted Specimen: Respiratory from Tracheal Aspirate Updated: 11/25/22 2354    Blood culture [[id number]] Collected: 11/25/22 2220    Order Status: Sent Specimen: Blood Updated: 11/25/22 2227    Blood culture [[id number]] Collected: 11/25/22 2214    Order  Status: Sent Specimen: Blood Updated: 11/25/22 2227          Latest lactate reviewed, they are-  Recent Labs   Lab 11/25/22 1827 11/25/22 2037   LACTATE 6.8* 2.7*       Organ dysfunction indicated by Acute kidney injury, Acute respiratory failure, Acute liver injury and Lactate 6.8 then 2.7  Source- CAP in setting of Influenza A. Cannot rule out superimposed bacterial infection.     Source control Achieved by- droplet precautions.   - Vanc and Zosyn and Tamiflu      MILY (acute kidney injury)  Patient with acute kidney injury likely due to severe sepsis MILY is currently just diagnosed. Labs reviewed- Renal function/electrolytes with Estimated Creatinine Clearance: 35.2 mL/min (A) (based on SCr of 1.45 mg/dL (H)). according to latest data. Monitor urine output and serial BMP and adjust therapy as needed. Avoid nephrotoxins and renally dose meds for GFR listed above.     - Baseline Cr 0.84  - No IVF as CTA shows possible right heart failure.       Pneumonia due to influenza A virus  - PSI: 129 (Risk Class IV, 8.2-9.3% mortality)  - Tested positive for Influenza A on 11/23/22.  - CXR: possible R lung pneumonitis.   - CTA: No PE, possible R heart failure and possible pneumonitis.   - Tamiflu, Vanc and Zosyn.   - Xopenex Q8H and Q4H prn in the setting of A fib with RVR. Atrovent Q8H. Solumedrol 40 mg Q6H.   - Blood and sputum Cx pending  - Mechanical ventilation       COPD exacerbation  - See plan for PNA  - pBNP pending         Elevated troponin  - Etiology ACS vs prolonged chest compressions (20 min) vs type 2 MI 2/2 severe sepsis and MILY.   - LIONEL: 49 (intermediate/ high risk). Heart: 8 (high). Ana: 156 (25% probability of death from admission to 6 months).  - EKG: A fib with . T wave inversions in precordial leads. Repeat pending.   - Troponin 330-->295. Repeat pending.  - Hold home BB 2/2 UDS positive for cocaine. Start Cardizem  mg.  - Start ASA, Atorvastatin 80 mg. No ACEi/ARB 2/2 MILY and low BP.    - Echo pending  - Cardiology consulted.       Atrial fibrillation with RVR  Patient with atrial fibrillation which is controlled currently with Beta Blocker. Patient is currently in atrial fibrillation.[id number] Score: 1. HASBLED Score: >3 (high bleed risk). Anticoagulation indicated. Anticoagulation done with Eliquis. Type of A fib is unknown. All prior EKGs review, with none of them reflecting A fib.   - Discontinue BB 2/2 UDS positive for cocaine. Start Cardizem  mg.         D-dimer, elevated  - Ddimer 6.26. Age-adjusted ddimer 0.69 ug/mL, reflecting VTE possible.   - CTA showed no PE  - Venous doppler BLE pending        Drug abuse  - UDS positive for cocaine and benzo. Avoid BB.       HTN (hypertension)  - Hold Lopressor 2/2 UDS positive for cocaine. Hold Amlodipine 10 mg 2/2 low BP.   - Start Cardizem  mg.       HLD (hyperlipidemia)  - Started Atorvastatin 80 mg      Hernia of abdominal wall  - s/p repair on 11/14      VTE Risk Mitigation (From admission, onward)           Ordered     apixaban tablet 5 mg  2 times daily         11/25/22 2103     IP VTE LOW RISK PATIENT  Once         11/25/22 2103     Place NATALIIA hose  Until discontinued         11/25/22 2103                       Harini Schwarz DO  Department of Hospital Medicine   [facility]

## 2022-11-26 NOTE — ASSESSMENT & PLAN NOTE
- Undifferentiated shock likely cardiogenic as EF is 25% and presence of severe RV failure.   - Continue antibiotics x 48 hours. Discontinue antibiotic once blood Cx negative.  - dobutamine and levophed infusion

## 2022-11-26 NOTE — HOSPITAL COURSE
Patient is a 67-year-old male who presented to the emergency room after an witnessed out of the hospital cardiac arrest with non shockable rhythm.  From the time of cardiac arrest to ROSC was approximately 20 minutes.  Troponin was elevated and EKG showed T-wave inversion in precordial leads with no previous EKG for comparison.  Feel that elevated troponin level was due to prolonged CPR and not necessarily due to ACS.  Cause of cardiac arrest is unknown at this time. Cardiology has been consulted and will proceed with echocardiogram.   Due to the prolonged resuscitative time, presence of anoxic brain injury was likely and thus patient was started on targeted temperature management protocol.  Poor prognosis overall.    Hospital course - admitted after outside hospital cardiac arrest. HE suffered another cardiac arrest after admission. UDS positive for cocaine and benzos. Long history of cocaine use according to family. Echo revealed EF of 25%. Too unstable for left heart cath. He was treated for cardiogenic shock with dopamine, dobutamine and levo. Post TTM protocol neurologically he never woke up. He did have myoclonic jerks but no sign of seizures on EEG. He also developed MILY. After discussion with family the decision was made to extubate him and make him comfort care. He was extubated at 1515 today. He was pronounced at 1602. Will be discharge to  home of choice.

## 2022-11-26 NOTE — ASSESSMENT & PLAN NOTE
Patient with atrial fibrillation which is controlled currently with Beta Blocker. Patient is currently in atrial fibrillation.[id number] Score: 1. HASBLED Score: >3 (high bleed risk). Anticoagulation indicated. Anticoagulation done with Eliquis. Type of A fib is unknown. All prior EKGs review, with none of them reflecting A fib.   - Discontinue BB 2/2 UDS positive for cocaine. Start Cardizem  mg.    11/26  - after discussion with Dr Purvis-- d/c cardizem- digoxin 250mcg x 1 with amiodarone infusion protocol

## 2022-11-26 NOTE — ASSESSMENT & PLAN NOTE
Patient with acute kidney injury likely due to severe sepsis MILY is currently just diagnosed. Labs reviewed- Renal function/electrolytes with Estimated Creatinine Clearance: 38 mL/min (A) (based on SCr of 1.34 mg/dL (H)). according to latest data. Monitor urine output and serial BMP and adjust therapy as needed. Avoid nephrotoxins and renally dose meds for GFR listed above.     - Baseline Cr 0.84  - No IVF as CTA shows possible right heart failure.

## 2022-11-26 NOTE — SUBJECTIVE & OBJECTIVE
Interval History: Intubated and sedated. On TTM, breathing over ventilator at present.     Objective:     Vital Signs (Most Recent):  Temp: (!) 93.2 °F (34 °C) (11/26/22 1230)  Pulse: (!) 125 (11/26/22 1230)  Resp: (!) 23 (11/26/22 1230)  BP: (!) 156/111 (11/26/22 1230)  SpO2: (!) 90 % (11/26/22 1230)   Vital Signs (24h Range):  Temp:  [92.5 °F (33.6 °C)-96.4 °F (35.8 °C)] 93.2 °F (34 °C)  Pulse:  [] 125  Resp:  [9-36] 23  SpO2:  [67 %-100 %] 90 %  BP: ()/() 156/111     Weight: 51.8 kg (114 lb 3.2 oz)  Body mass index is 16.86 kg/m².      Intake/Output Summary (Last 24 hours) at 11/26/2022 1504  Last data filed at 11/26/2022 1300  Gross per 24 hour   Intake 1310.92 ml   Output 330 ml   Net 980.92 ml       Physical Exam  Vitals and nursing note reviewed.   Constitutional:       General: He is not in acute distress.     Appearance: He is ill-appearing. He is not diaphoretic.   HENT:      Head: Normocephalic and atraumatic.      Right Ear: External ear normal.      Left Ear: External ear normal.      Nose: Nose normal.   Eyes:      Pupils: Pupils are equal, round, and reactive to light.   Cardiovascular:      Rate and Rhythm: Tachycardia present. Rhythm irregular.   Abdominal:      General: Bowel sounds are normal. There is no distension.      Palpations: Abdomen is soft.   Musculoskeletal:         General: No swelling.      Right lower leg: No edema.      Left lower leg: No edema.   Skin:     General: Skin is cool and dry.      Capillary Refill: Capillary refill takes more than 3 seconds.      Comments: TTM ogoing  L groin incision site    Neurological:      Comments: Intubated and sedated    Psychiatric:      Comments: Intubated and sedated          Vents:  Vent Mode: A/C (11/26/22 1115)  Ventilator Initiated: Yes (11/25/22 3290)  Set Rate: 18 BPM (11/26/22 1115)  Vt Set: 550 mL (11/26/22 1115)  PEEP/CPAP: 5 cmH20 (11/26/22 1115)  Oxygen Concentration (%): 50 (11/26/22 1115)  Peak Airway Pressure:  21 cmH20 (11/26/22 1115)  Total Ve: 9.3 L/m (11/26/22 1115)  F/VT Ratio<105 (RSBI): (!) 35.85 (11/26/22 1115)    Lines/Drains/Airways       Drain  Duration                  NG/OG Tube 11/25/22 2330 Center mouth <1 day         Urethral Catheter 11/25/22 1842 Temperature probe 16 Fr. <1 day              Airway  Duration                  Airway - Non-Surgical 11/25/22 1 day              Peripheral Intravenous Line  Duration                  External Jugular IV 11/25/22 1834 <1 day         Peripheral IV - Single Lumen 11/25/22 1726 18 G Anterior;Right  <1 day         Peripheral IV - Single Lumen 11/25/22 1735 16 G Anterior;Left Forearm <1 day                    Significant Labs:    CBC/Anemia Profile:  Recent Labs   Lab 11/25/22 1739 11/26/22  0109 11/26/22  0440   WBC 7.57  --  9.74   HGB 13.9  --  12.9*   HCT 45.1 45 41.2   *  --  217   MCV 95.1  --  93.0   RDW 14.1  --  14.5        Chemistries:  Recent Labs   Lab 11/25/22  1739 11/25/22  2133 11/26/22  0016 11/26/22  0827    139 139 137   K 5.0 5.6* 5.6* 4.2   CL 99 103 101 102   CO2 25 30 30 29   BUN 26* 28* 28* 33*   CREATININE 1.34* 1.46* 1.45* 1.32*   CALCIUM 8.3* 7.9* 8.3* 8.1*   ALBUMIN 2.6* 2.4*  --   --    PROT 6.1* 6.2*  --   --    BILITOT 0.4 0.7  --   --    ALKPHOS 87 107  --   --    ALT 85* 122*  --   --    * 231*  --   --    MG 2.2  --  2.4* 4.6*   PHOS  --   --  6.1* 5.4*       All pertinent labs within the past 24 hours have been reviewed.    Significant Imaging:  I have reviewed all pertinent imaging results/findings within the past 24 hours.

## 2022-11-26 NOTE — ASSESSMENT & PLAN NOTE
- Ddimer 6.26. Age-adjusted ddimer 0.69 ug/mL, reflecting VTE possible.   - CTA showed no PE  - Venous doppler BLE pending

## 2022-11-26 NOTE — ASSESSMENT & PLAN NOTE
- Undifferentiated shock likely cardiogenic as EF is 25% and presence of severe RV failure.   - Continue antibiotics x 48 hours. Discontinue antibiotic once blood Cx negative.

## 2022-11-26 NOTE — ASSESSMENT & PLAN NOTE
This patient does have evidence of infective focus  My overall impression is sepsis. Vital signs were reviewed and noted in progress note.  Antibiotics given-   Antibiotics (From admission, onward)    Start     Stop Route Frequency Ordered    11/26/22 0200  piperacillin-tazobactam (ZOSYN) 4.5 g in dextrose 5 % in water (D5W) 5 % 100 mL IVPB (MB+)  (piperacillin/tazobactam IVPB in adult patients)         -- IV Every 8 hours (non-standard times) 11/25/22 2148    11/26/22 0012  vancomycin - pharmacy to dose         -- IV pharmacy to manage frequency 11/25/22 2312 11/26/22 0000  vancomycin (VANCOCIN) 1,000 mg in dextrose 5 % 250 mL IVPB         -- IV Every 24 hours (non-standard times) 11/25/22 2312 11/25/22 2115  mupirocin 2 % ointment         11/30 2059 Nasl 2 times daily 11/25/22 2008        Cultures were taken-   Microbiology Results (last 7 days)     Procedure Component Value Units Date/Time    Culture, Lower Respiratory [[id number]] Collected: 11/25/22 2348    Order Status: Resulted Specimen: Respiratory from Tracheal Aspirate Updated: 11/25/22 2354    Blood culture [[id number]] Collected: 11/25/22 2220    Order Status: Sent Specimen: Blood Updated: 11/25/22 2227    Blood culture [[id number]] Collected: 11/25/22 2214    Order Status: Sent Specimen: Blood Updated: 11/25/22 2227        Latest lactate reviewed, they are-  Recent Labs   Lab 11/25/22 1827 11/25/22 2037   LACTATE 6.8* 2.7*       Organ dysfunction indicated by Acute kidney injury, Acute respiratory failure, Acute liver injury and Lactate 6.8 then 2.7  Source- CAP in setting of Influenza A. Cannot rule out superimposed bacterial infection.     Source control Achieved by- droplet precautions.   - Vanc and Zosyn and Tamiflu

## 2022-11-26 NOTE — PLAN OF CARE
Problem: Adult Inpatient Plan of Care  Goal: Plan of Care Review  Outcome: Ongoing, Progressing  Goal: Patient-Specific Goal (Individualized)  Outcome: Ongoing, Progressing  Goal: Absence of Hospital-Acquired Illness or Injury  Outcome: Ongoing, Progressing  Intervention: Identify and Manage Fall Risk  Flowsheets (Taken 11/25/2022 2112)  Safety Promotion/Fall Prevention:   Fall Risk signage in place   Fall Risk reviewed with patient/family   bed alarm set   assistive device/personal item within reach   pulse ox   side rails raised x 2   instructed to call staff for mobility  Intervention: Prevent Skin Injury  Flowsheets (Taken 11/25/2022 2112)  Body Position: turned  Skin Protection: tubing/devices free from skin contact  Intervention: Prevent and Manage VTE (Venous Thromboembolism) Risk  Flowsheets (Taken 11/25/2022 2112)  Activity Management: Rolling - L1  VTE Prevention/Management: remove, assess skin, and reapply sequential compression device  Range of Motion: ROM (range of motion) performed  Intervention: Prevent Infection  Flowsheets (Taken 11/25/2022 2112)  Infection Prevention:   rest/sleep promoted   hand hygiene promoted  Goal: Optimal Comfort and Wellbeing  Outcome: Ongoing, Progressing  Intervention: Monitor Pain and Promote Comfort  Flowsheets (Taken 11/25/2022 2112)  Pain Management Interventions:   position adjusted   pillow support provided  Goal: Readiness for Transition of Care  Outcome: Ongoing, Progressing

## 2022-11-26 NOTE — ASSESSMENT & PLAN NOTE
- PSI: 129 (Risk Class IV, 8.2-9.3% mortality)  - Tested positive for Influenza A on 11/23/22.  - CXR: possible R lung pneumonitis.   - CTA: No PE, possible R heart failure and possible pneumonitis.   - Tamiflu, Vanc and Zosyn.   - Xopenex Q8H in the setting of A fib with RVR. Albuterol neb prn. Solumedrol 40 mg Q6H.   - Blood and sputum Cx pending  - Mechanical ventilation

## 2022-11-26 NOTE — ASSESSMENT & PLAN NOTE
Patient with acute kidney injury likely due to severe sepsis MILY is currently just diagnosed. Labs reviewed- Renal function/electrolytes with Estimated Creatinine Clearance: 35.2 mL/min (A) (based on SCr of 1.45 mg/dL (H)). according to latest data. Monitor urine output and serial BMP and adjust therapy as needed. Avoid nephrotoxins and renally dose meds for GFR listed above.     - Baseline Cr 0.84  - No IVF as CTA shows possible right heart failure.

## 2022-11-26 NOTE — ASSESSMENT & PLAN NOTE
- Echo: Abnormal wall abnormities. Takasubo vs LAD MI infarct  - ACS protocol  - LIONEL: 49 (intermediate/ high risk). Heart: 8 (high). Ana: 156 (25% probability of death from admission to 6 months).  - EKG: A fib with . T wave inversions in precordial leads.   - Troponin 334-->295-->375-->326  - Continue ASA, Atorvastatin 80 mg.   - No ACEi/ARB or BB 2/2 low BP requiring pressors. UDS also positive for cocaine.   - Heparin gtt.

## 2022-11-26 NOTE — SUBJECTIVE & OBJECTIVE
Past Medical History:   Diagnosis Date    A-fib     Colitis     COPD (chronic obstructive pulmonary disease)     High cholesterol     Hypertension        Past Surgical History:   Procedure Laterality Date    BACK SURGERY      COLONOSCOPY N/A 5/22/2018    Procedure: COLONOSCOPY;  Surgeon: Sudhakar Herrera MD;  Location: [facility];  Service: Endoscopy;  Laterality: N/A;    OLECRANON BURSECTOMY Left 8/18/2022    Procedure: BURSECTOMY, OLECRANON;  Surgeon: Santiago Roman MD;  Location: [facility];  Service: Orthopedics;  Laterality: Left;    WRIST SURGERY         Review of patient's allergies indicates:  No Known Allergies    No current facility-administered medications on file prior to encounter.     Current Outpatient Medications on File Prior to Encounter   Medication Sig    albuterol (PROVENTIL/VENTOLIN HFA) 90 mcg/actuation inhaler Inhale 2 puffs into the lungs every 6 (six) hours as needed for Wheezing. Rescue    amLODIPine (NORVASC) 10 MG tablet Take 1 tablet (10 mg total) by mouth once daily.    apixaban (ELIQUIS) 5 mg Tab Take 5 mg by mouth 2 (two) times daily. 1 po bid    azithromycin (Z-ALEX) 250 MG tablet Take 2 tablets by mouth on day 1; Take 1 tablet by mouth on days 2-5    DULoxetine (CYMBALTA) 60 MG capsule Take 1 capsule (60 mg total) by mouth once daily. (Patient not taking: Reported on 11/1/2022)    ergocalciferol (ERGOCALCIFEROL) 50,000 unit Cap Take 1 capsule (50,000 Units total) by mouth every 7 days. Take once weekly for 12 weeks.    fluticasone-umeclidin-vilanter (TRELEGY ELLIPTA) 100-62.5-25 mcg DsDv Inhale 1 puff into the lungs once daily.    gabapentin (NEURONTIN) 300 MG capsule Take 300 mg by mouth 2 (two) times daily.    metoprolol tartrate (LOPRESSOR) 25 MG tablet Take 25 mg by mouth 2 (two) times daily.    oseltamivir (TAMIFLU) 75 MG capsule Take 1 capsule (75 mg total) by mouth 2 (two) times daily. for 5 days    predniSONE (DELTASONE) 20 MG tablet Take 1 tablet (20 mg  total) by mouth once daily. for 5 days    sildenafil (VIAGRA) 100 MG tablet Take 1 tablet (100 mg total) by mouth daily as needed for Erectile Dysfunction. (Patient not taking: Reported on 11/1/2022)    tiotropium (SPIRIVA WITH HANDIHALER) 18 mcg inhalation capsule Inhale 1 capsule (18 mcg total) into the lungs once daily. Controller (Patient not taking: Reported on 11/1/2022)    [DISCONTINUED] lovastatin (MEVACOR) 40 MG tablet Take 1 tablet (40 mg total) by mouth every evening.    [DISCONTINUED] RABEprazole (ACIPHEX) 20 mg tablet Take 1 tablet (20 mg total) by mouth once daily.     Family History       Problem Relation (Age of Onset)    Heart failure Mother    No Known Problems Father, Sister, Brother, Brother          Tobacco Use    Smoking status: Every Day     Packs/day: 1.00     Years: 40.00     Pack years: 40.00     Types: Cigarettes    Smokeless tobacco: Never   Substance and Sexual Activity    Alcohol use: Yes     Alcohol/week: 1.0 standard drink     Types: 1 Shots of liquor per week     Comment: Occ    Drug use: Yes     Types: Cocaine     Comment: 2wees ago    Sexual activity: Not on file     Review of Systems   Unable to perform ROS: Intubated   Objective:     Vital Signs (Most Recent):  Temp: (!) 93.2 °F (34 °C) (11/26/22 1230)  Pulse: (!) 125 (11/26/22 1230)  Resp: (!) 23 (11/26/22 1230)  BP: (!) 156/111 (11/26/22 1230)  SpO2: (!) 90 % (11/26/22 1230)   Vital Signs (24h Range):  Temp:  [92.5 °F (33.6 °C)-96.4 °F (35.8 °C)] 93.2 °F (34 °C)  Pulse:  [] 125  Resp:  [9-36] 23  SpO2:  [67 %-100 %] 90 %  BP: ()/() 156/111     Weight: 51.8 kg (114 lb 3.2 oz)  Body mass index is 16.86 kg/m².    Physical Exam  Vitals and nursing note reviewed.   Constitutional:       General: He is not in acute distress.     Appearance: He is ill-appearing. He is not diaphoretic.   HENT:      Head: Normocephalic and atraumatic.      Right Ear: External ear normal.      Left Ear: External ear normal.      Nose:  Nose normal.   Eyes:      Comments: Pupils dilated but reactive to light   Cardiovascular:      Rate and Rhythm: Tachycardia present. Rhythm irregular.      Comments: Heart sounds difficult to auscultate 2/2 loud lung sounds while on mechanical ventilation.   Pulmonary:      Effort: Respiratory distress present.      Breath sounds: Wheezing and rhonchi present.      Comments: Pt is tachypnea, breathing over ventilation.   Abdominal:      General: Bowel sounds are normal.      Palpations: Abdomen is soft.   Musculoskeletal:         General: No swelling.      Right lower leg: No edema.      Left lower leg: No edema.   Skin:     General: Skin is dry.      Capillary Refill: Capillary refill takes more than 3 seconds.      Comments: Skin cool as pt is on target temperature protocol.    Neurological:      Comments: Pt intubated and sedated.    Psychiatric:      Comments: Pt intubated and sedated.          Significant Labs: All pertinent labs within the past 24 hours have been reviewed.    Significant Imaging: I have reviewed all pertinent imaging results/findings within the past 24 hours.

## 2022-11-26 NOTE — ASSESSMENT & PLAN NOTE
Etiology ACS vs prolonged chest compressions (20 min) vs type 2 MI 2/2 severe sepsis and MILY.   - LIONEL: 49 (intermediate/ high risk). Heart: 8 (high). Ana: 156 (25% probability of death from admission to 6 months).  - EKG: A fib with . T wave inversions in precordial leads. Repeat pending.   - Troponin 330-->295. Repeat pending.  - Hold home BB 2/2 UDS positive for cocaine. Start Cardizem  mg.  - Start ASA, Atorvastatin 80 mg. No ACEi/ARB 2/2 MILY and low BP.   - Echo with EF 25%  - heparin infusion   - Cardiology consulted.

## 2022-11-26 NOTE — ASSESSMENT & PLAN NOTE
Patient is identified as having Systolic (HFrEF) heart failure that is Acute. CHF is currently uncontrolled due to Rales/crackles on pulmonary exam. Latest ECHO performed and demonstrates- Results for orders placed during the hospital encounter of 11/25/22    - Echo:Atrial fibrillation. LVEF 25%. Segmental LV wall abnormalities. Oden, anterior and anteroseptal wall is akinetic (LAD infarct vs Takasubo). Moderate RVE with severely reduced RV systolic function. Severe LAURITA. Moderate to severe MR.   - Continue Furosemide and monitor clinical status closely. Monitor on telemetry. Patient is on CHF pathway.  Monitor strict Is&Os and daily weights.  Place on fluid restriction of 1.5 L. Continue to stress to patient importance of self efficacy and  on diet for CHF. Last BNP reviewed- 5,100.     - Dopamine and Levophed  - Lasix 60 mg Q12H  - No ACEi/ARB or BB 2/2 low BP requiring pressors.

## 2022-11-26 NOTE — ASSESSMENT & PLAN NOTE
Possible etiology include:   - Ischemic heart disease including ACS: Although troponin elevated, the likelihood of ACS is unknown as pt received chest compressions. EKG did not show ST deviations. Echo pending.   - Hypoxemia: Pt with COPDe and PNA 2/2 influenza A with superimposed bacterial infection , which cannot be ruled out.   - Valvular heart disease or Hypertrophic CM: Echo pending.   - Arrhythmia: EKG shows A fib with RVR.   - Prolonged QT: EKG with QTc 459.     Unlikely etiologies include:  - Intracranial hemorrhage: Unlikely as CT head unremarkable   - PE: CTA showed no PE. Venous doppler of BLE pending  - Aortic dissection: No significant widened mediastinal on CXR.   - Acute pericardial tamponade: Not reflected on EKG or CTA.   - Pneumothorax: CXR blackman snot reflect pneumothorax.     Plan  - Mechanical ventilation   - Target temperature management protocol with target temperature of 36C.   - Cardiology consulted

## 2022-11-26 NOTE — ASSESSMENT & PLAN NOTE
This patient does have evidence of infective focus  My overall impression is sepsis. Vital signs were reviewed and noted in progress note.  Antibiotics given-   Antibiotics (From admission, onward)        Start     Stop Route Frequency Ordered     11/26/22 0200   piperacillin-tazobactam (ZOSYN) 4.5 g in dextrose 5 % in water (D5W) 5 % 100 mL IVPB (MB+)  (piperacillin/tazobactam IVPB in adult patients)         -- IV Every 8 hours (non-standard times) 11/25/22 2148     11/26/22 0012   vancomycin - pharmacy to dose         -- IV pharmacy to manage frequency 11/25/22 2312 11/26/22 0000   vancomycin (VANCOCIN) 1,000 mg in dextrose 5 % 250 mL IVPB         -- IV Every 24 hours (non-standard times) 11/25/22 2312 11/25/22 2115   mupirocin 2 % ointment         11/30 2059 Nasl 2 times daily 11/25/22 2008             Cultures were taken-   Microbiology Results (last 7 days)         Procedure Component Value Units Date/Time     Culture, Lower Respiratory [[id number]] Collected: 11/25/22 2348     Order Status: Resulted Specimen: Respiratory from Tracheal Aspirate Updated: 11/25/22 2354     Blood culture [[id number]] Collected: 11/25/22 2220     Order Status: Sent Specimen: Blood Updated: 11/25/22 2227     Blood culture [[id number]] Collected: 11/25/22 2214     Order Status: Sent Specimen: Blood Updated: 11/25/22 2227             Latest lactate reviewed, they are-       Recent Labs   Lab 11/25/22  1827 11/25/22 2037   LACTATE 6.8* 2.7*         Organ dysfunction indicated by Acute kidney injury, Acute respiratory failure, Acute liver injury and Lactate 6.8 then 2.7  Source- CAP in setting of Influenza A. Cannot rule out superimposed bacterial infection.      Source control Achieved by- droplet precautions.   - Vanc and Zosyn and Tamiflu    11/26- EF 25%; concern more shock related at present  - follow cultures- if remain negative will d/c abx

## 2022-11-26 NOTE — NURSING
0750 Report receive OTP from ED RN.     2100 Pt arrived from ED. Respiratory and RN at bedside.  and  notified of Pt.

## 2022-11-26 NOTE — ASSESSMENT & PLAN NOTE
Patient with acute kidney injury likely due to severe sepsis MILY is currently just diagnosed. Labs reviewed- Renal function/electrolytes with Estimated Creatinine Clearance: 35.2 mL/min (A) (based on SCr of 1.45 mg/dL (H)). according to latest data. Monitor urine output and serial BMP and adjust therapy as needed. Avoid nephrotoxins and renally dose meds for GFR listed above.      - Baseline Cr 0.84  - No IVF as CTA shows possible right heart failure.  11/26- on dobutamine infusion; Cr remains stable at present; UOP mediocre

## 2022-11-26 NOTE — ASSESSMENT & PLAN NOTE
- Hold Lopressor 2/2 UDS positive for cocaine. Hold Amlodipine 10 mg 2/2 low BP.   - Start Cardizem  mg.

## 2022-11-26 NOTE — PROCEDURES
My hands were washed immediately prior to the procedure. I wore a surgical cap, mask  with protective eyewear, full gown and sterile gloves throughout the  procedure. Right chest region was prepped using chlorhexidine scrub and draped in  sterile fashion using a full drape and sterile probe cover and sterile  gel employed. The medial and lateral heads of the sternocleidomastoid  muscle were identified as was the carotid pulse. The Internal Jugular  vein was identified using the ultrasound. Using real-time out of plane guidance, the  introducer needle was inserted into the Internal Jugular vein under  direct ultrasound visualization. Venous blood was withdrawn. Guidewire was advanced into the introducer needle.  The guidewire was visualized in the Internal Jugular Vein by ultrasound.  A small incision was made at the skin surface with a scalpel and the  introducer needle was exchanged for a dilator over the guidewire. After  appropriate dilation was obtained, the dilator was exchanged over the  wire for a central venous catheter. The wire was removed and the  catheter was sutured in place. A sterile biopatch was placed over the catheter at the insertion site. The patient tolerated  the procedure without any hemodynamic compromise. At time of procedure completion, all ports aspirated and flushed properly. Post-procedure chest x-ray performed showing catheter taking a loop and advanced in a cephalad direction.  On check all 3 ports flush but only the distal port draws. Catheter was partially withdrawn. Repeated XR shows catheter continues to loop cephalad but is now more caudad. Distal port verified for usage. Estimated blood loss is 3 cc. Attending physician, Dr. Broussard, was present during entire procedure.

## 2022-11-26 NOTE — CONSULTS
Pharmacy consulted to dose Vancomycin. Based on pt wt and current renal function, the following therapy will be initiated:    Vancomycin 1000 mg IV q 24 hrs. Trough prior 4th dose.     Pharmacy to follow daily and make necessary adjustments.    Goal trough is 15-20 based on indication.     Thank you.

## 2022-11-27 NOTE — ASSESSMENT & PLAN NOTE
Possible etiologies include:   - Ischemic heart disease including ACS: Although troponin elevated, the likelihood of ACS is unknown as pt received prolonged chest compressions (20 min). EKG did not show ST deviations. Echo pending.   - Hypoxemia: Pt with COPDe and PNA 2/2 influenza A with superimposed bacterial infection , which cannot be ruled out.   - Valvular heart disease or Hypertrophic CM: Echo pending.   - Arrhythmia: EKG shows A fib with RVR.   - Prolonged QT: EKG with QTc 459.   - Cocaine-induced arrhythmias or MI: UDS cocaine positive     Unlikely etiologies include:  - Intracranial hemorrhage: Unlikely as CT head unremarkable   - PE: CTA showed no PE. Venous doppler of BLE pending  - Aortic dissection: No significant widened mediastinal on CXR.   - Acute pericardial tamponade: Not reflected on EKG or CTA.   - Pneumothorax: CXR blackman snot reflect pneumothorax.      Plan  - Mechanical ventilation   - Concern for anoxic brain injury as duration of arrest was approximately 20 minute before ROSC. Target temperature management protocol with target temperature of 36C.  - Cardiology consulted    11/26- TTM continues   - cardiology following   - dopamine, levo, and versed infusion at present

## 2022-11-27 NOTE — PROGRESS NOTES
[facility]  Pulmonology  Progress Note    Patient Name: Joao Chi Sr  MRN: [id number]  Admission Date: 11/25/2022  Hospital Length of Stay: 2 days  Code Status: Full Code  Attending Provider: Radha Purvis MD  Primary Care Provider: Cornelia Weiss MD   Principal Problem: Cardiac arrest    Subjective:     Interval History: Pt remains intubated and sedated. Started having seizure like activity last night- another episode today-- increased keppra with ativan prn.   Adult children (daughter and son) have arrived and are up to date on plan and goals of care- pt is to remain a full code per their wishes.     Objective:     Vital Signs (Most Recent):  Temp: (!) 95.4 °F (35.2 °C) (11/27/22 1405)  Pulse: 73 (11/27/22 1405)  Resp: 18 (11/27/22 1405)  BP: 105/73 (11/27/22 1400)  SpO2: 100 % (11/27/22 1405)   Vital Signs (24h Range):  Temp:  [92.8 °F (33.8 °C)-96.6 °F (35.9 °C)] 95.4 °F (35.2 °C)  Pulse:  [] 73  Resp:  [7-19] 18  SpO2:  [89 %-100 %] 100 %  BP: ()/() 105/73     Weight: 52.8 kg (116 lb 6.5 oz)  Body mass index is 16.7 kg/m².      Intake/Output Summary (Last 24 hours) at 11/27/2022 1501  Last data filed at 11/27/2022 1400  Gross per 24 hour   Intake 1117.55 ml   Output 1600 ml   Net -482.45 ml       Physical Exam  Vitals and nursing note reviewed.   Constitutional:       General: He is not in acute distress.     Appearance: He is ill-appearing. He is not diaphoretic.   HENT:      Head: Normocephalic and atraumatic.      Right Ear: External ear normal.      Left Ear: External ear normal.      Nose: Nose normal.   Eyes:      Pupils: Pupils are equal, round, and reactive to light.   Cardiovascular:      Rate and Rhythm: Rhythm irregular.   Abdominal:      General: Bowel sounds are normal. There is no distension.      Palpations: Abdomen is soft.   Musculoskeletal:         General: No swelling.      Right lower leg: No edema.      Left lower leg: No edema.   Skin:      General: Skin is cool and dry.      Capillary Refill: Capillary refill takes more than 3 seconds.      Comments: TTKAYLIE jenkins  Old L groin incision site    Neurological:      Comments: Intubated and sedated    Psychiatric:      Comments: Intubated and sedated          Vents:  Vent Mode: A/C (11/27/22 1100)  Ventilator Initiated: Yes (11/25/22 1730)  Set Rate: 18 BPM (11/27/22 1100)  Vt Set: 550 mL (11/27/22 1100)  PEEP/CPAP: 5 cmH20 (11/27/22 1100)  Oxygen Concentration (%): 50 (11/27/22 1100)  Peak Airway Pressure: 20 cmH20 (11/27/22 1100)  Total Ve: 9.3 L/m (11/27/22 1100)  F/VT Ratio<105 (RSBI): (!) 34.62 (11/27/22 1100)    Lines/Drains/Airways       Central Venous Catheter Line  Duration             Percutaneous Central Line Insertion/Assessment - Triple Lumen  11/26/22 1400 right internal jugular 1 day              Drain  Duration                  NG/OG Tube 11/25/22 2330 Center mouth 1 day         Urethral Catheter 11/25/22 1842 Temperature probe 16 Fr. 1 day              Airway  Duration                  Airway - Non-Surgical 11/25/22 2 days              Arterial Line  Duration             Arterial Line 11/26/22 1600 Left Radial <1 day              Peripheral Intravenous Line  Duration                  External Jugular IV 11/25/22 1834 1 day         Peripheral IV - Single Lumen 11/25/22 1735 16 G Anterior;Left Forearm 1 day                    Significant Labs:    CBC/Anemia Profile:  Recent Labs   Lab 11/25/22 1739 11/26/22  0109 11/26/22  0440 11/27/22  0232   WBC 7.57  --  9.74 12.78*   HGB 13.9  --  12.9* 11.3*   HCT 45.1 45 41.2 34.1*   *  --  217 262   MCV 95.1  --  93.0 88.6   RDW 14.1  --  14.5 13.9        Chemistries:  Recent Labs   Lab 11/25/22  1739 11/25/22  2133 11/26/22  0016 11/27/22  0007 11/27/22  0750 11/27/22  1225    139   < > 139 137 138   K 5.0 5.6*   < > 3.9 3.7 3.9   CL 99 103   < > 103 103 102   CO2 25 30   < > 28 27 28   BUN 26* 28*   < > 34* 35* 37*   CREATININE 1.34*  1.46*   < > 1.51* 1.71* 1.90*   CALCIUM 8.3* 7.9*   < > 7.9* 7.7* 7.6*   ALBUMIN 2.6* 2.4*  --   --   --  1.9*   PROT 6.1* 6.2*  --   --   --  5.1*   BILITOT 0.4 0.7  --   --   --  0.5   ALKPHOS 87 107  --   --   --  89   ALT 85* 122*  --   --   --  76*   * 231*  --   --   --  62*   MG 2.2  --    < > 2.7* 2.6* 2.6*   PHOS  --   --    < > 4.7* 4.9* 5.3*    < > = values in this interval not displayed.       All pertinent labs within the past 24 hours have been reviewed.    Significant Imaging:  I have reviewed all pertinent imaging results/findings within the past 24 hours.    Assessment/Plan:     * Cardiac arrest  Possible etiologies include:   - Ischemic heart disease including ACS: Although troponin elevated, the likelihood of ACS is unknown as pt received prolonged chest compressions (20 min). EKG did not show ST deviations. Echo pending.   - Hypoxemia: Pt with COPDe and PNA 2/2 influenza A with superimposed bacterial infection , which cannot be ruled out.   - Valvular heart disease or Hypertrophic CM: Echo pending.   - Arrhythmia: EKG shows A fib with RVR.   - Prolonged QT: EKG with QTc 459.   - Cocaine-induced arrhythmias or MI: UDS cocaine positive     Unlikely etiologies include:  - Intracranial hemorrhage: Unlikely as CT head unremarkable   - PE: CTA showed no PE. Venous doppler of BLE pending  - Aortic dissection: No significant widened mediastinal on CXR.   - Acute pericardial tamponade: Not reflected on EKG or CTA.   - Pneumothorax: CXR blackman snot reflect pneumothorax.      Plan  - Mechanical ventilation   - Concern for anoxic brain injury as duration of arrest was approximately 20 minute before ROSC. Target temperature management protocol with target temperature of 36C.  - Cardiology consulted    11/26- TTM continues   - cardiology following   - dopamine, levo, and versed infusion at present     Shock  - Undifferentiated shock likely cardiogenic as EF is 25% and presence of severe RV failure.   -  Continue antibiotics x 48 hours. Discontinue antibiotic once blood Cx negative.  - dopamine and levophed infusion     Biventricular heart failure with reduced left ventricular function    Echo    Interpretation Summary  · Atrial fibrillation observed.  · The left ventricle is normal in size with mild concentric hypertrophy and severely decreased systolic function.  · The estimated ejection fraction is 25%.  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  · Severe right atrial enlargement.  · Moderate-to-severe mitral regurgitation.  · Mechanically ventilated; cannot use inferior caval vein diameter to estimate central venous pressure.    Continue lasix   Plans for [facility] tomorrow     D-dimer, elevated  - Ddimer 6.26. Age-adjusted ddimer 0.69 ug/mL, reflecting VTE possible.   - CTA showed no PE  - Venous doppler BLE negative     Atrial fibrillation with RVR  Patient with atrial fibrillation which is controlled currently with Beta Blocker. Patient is currently in atrial fibrillation.[id number] Score: 1. HASBLED Score: >3 (high bleed risk). Anticoagulation indicated. Anticoagulation done with Eliquis. Type of A fib is unknown. All prior EKGs review, with none of them reflecting A fib.   - Discontinue BB 2/2 UDS positive for cocaine. Start Cardizem  mg.    11/26  - after discussion with Dr Purvis-- d/c cardizem- digoxin 250mcg x 1 with amiodarone infusion protocol   11/27- controlled afib at present       NSTEMI (non-ST elevated myocardial infarction)  Etiology ACS vs prolonged chest compressions (20 min) vs type 2 MI 2/2 severe sepsis and MILY.   - LIONEL: 49 (intermediate/ high risk). Heart: 8 (high). Ana: 156 (25% probability of death from admission to 6 months).  - EKG: A fib with . T wave  inversions in precordial leads. Repeat pending.   - Troponin 330-->295. Repeat pending.  - Hold home BB 2/2 UDS positive for cocaine. Start Cardizem  mg.  - Start ASA, Atorvastatin 80 mg. No ACEi/ARB 2/2 MILY and low BP.   - Echo with EF 25%  - heparin infusion   - Cardiology consulted.   11/27- plans for [facility] tomorrow     COPD exacerbation  - continue current meds     Pneumonia due to influenza A virus  - PSI: 129 (Risk Class IV, 8.2-9.3% mortality)  - Tested positive for Influenza A on 11/23/22.  - CXR: possible R lung pneumonitis.   - CTA: No PE, possible R heart failure and possible pneumonitis.   - Tamiflu, Vanc and Zosyn.   - Xopenex Q8H and Q4H prn in the setting of A fib with RVR. Atrovent Q8H. Solumedrol 40 mg Q6H.   - Blood and sputum Cx pending  - Mechanical ventilation    11/27- continue abx; follow cultures     MILY (acute kidney injury)  Patient with acute kidney injury likely due to severe sepsis MILY is currently just diagnosed. Labs reviewed- Renal function/electrolytes with Estimated Creatinine Clearance: 35.2 mL/min (A) (based on SCr of 1.45 mg/dL (H)). according to latest data. Monitor urine output and serial BMP and adjust therapy as needed. Avoid nephrotoxins and renally dose meds for GFR listed above.      - Baseline Cr 0.84  - No IVF as CTA shows possible right heart failure.  11/26- on dopamine infusion; Cr remains stable at present; UOP mediocre   11/27- UOP 1600; Cr 1.9; continue on dopamine     Severe sepsis  This patient does have evidence of infective focus  My overall impression is sepsis. Vital signs were reviewed and noted in progress note.  Antibiotics given-   Antibiotics (From admission, onward)        Start     Stop Route Frequency Ordered     11/26/22 0200   piperacillin-tazobactam (ZOSYN) 4.5 g in dextrose 5 % in water (D5W) 5 % 100 mL IVPB (MB+)  (piperacillin/tazobactam IVPB in adult patients)         -- IV Every 8 hours (non-standard times) 11/25/22 2148     11/26/22 0012    vancomycin - pharmacy to dose         -- IV pharmacy to manage frequency 11/25/22 2312 11/26/22 0000   vancomycin (VANCOCIN) 1,000 mg in dextrose 5 % 250 mL IVPB         -- IV Every 24 hours (non-standard times) 11/25/22 2312 11/25/22 2115   mupirocin 2 % ointment         11/30 2059 Nasl 2 times daily 11/25/22 2008             Cultures were taken-   Microbiology Results (last 7 days)         Procedure Component Value Units Date/Time     Culture, Lower Respiratory [[id number]] Collected: 11/25/22 2348     Order Status: Resulted Specimen: Respiratory from Tracheal Aspirate Updated: 11/25/22 2354     Blood culture [[id number]] Collected: 11/25/22 2220     Order Status: Sent Specimen: Blood Updated: 11/25/22 2227     Blood culture [[id number]] Collected: 11/25/22 2214     Order Status: Sent Specimen: Blood Updated: 11/25/22 2227             Latest lactate reviewed, they are-       Recent Labs   Lab 11/25/22 1827 11/25/22 2037   LACTATE 6.8* 2.7*         Organ dysfunction indicated by Acute kidney injury, Acute respiratory failure, Acute liver injury and Lactate 6.8 then 2.7  Source- CAP in setting of Influenza A. Cannot rule out superimposed bacterial infection.      Source control Achieved by- droplet precautions.   - Vanc and Zosyn and Tamiflu    11/26- EF 25%; concern more shock related at present  - follow cultures- if remain negative will d/c abx       Acute respiratory failure with hypoxia and hypercarbia  Patient with Hypercapnic and Hypoxic Respiratory failure which is Acute.  Whether he is on home oxygen is unknown. Supplemental oxygen was provided and noted- Vent Mode: A/C  Oxygen Concentration (%):  [50] 50  Resp Rate Total:  [18 br/min-31 br/min] 18 br/min  Vt Set:  [400 mL-500 mL] 500 mL  PEEP/CPAP:  [5 cmH20] 5 cmH20.   Signs/symptoms of respiratory failure include- tachypnea and unresponsiveness. Contributing diagnoses includes - COPD, Pneumonia and cardiac arrest Labs and images were reviewed.  Patient Has recent ABG, which has been reviewed. Will treat underlying causes and adjust management of respiratory failure as follows- mechanical ventilation, steroids, xopenex nebs and Atrovent Q8H.   11/26- post code abgs stable; wean FiO2 to maintain sats >90   11/27- stable at present; will continue to rest until TTM protocol has ended then will attempt CPAP trials                  GOMEZ Yepez-FELICIAP  Pulmonology  [facility]

## 2022-11-27 NOTE — ASSESSMENT & PLAN NOTE
This patient does have evidence of infective focus  My overall impression is sepsis. Vital signs were reviewed and noted in progress note.  Antibiotics given-   Antibiotics (From admission, onward)        Start     Stop Route Frequency Ordered     11/26/22 0200   piperacillin-tazobactam (ZOSYN) 4.5 g in dextrose 5 % in water (D5W) 5 % 100 mL IVPB (MB+)  (piperacillin/tazobactam IVPB in adult patients)         -- IV Every 8 hours (non-standard times) 11/25/22 2148     11/26/22 0012   vancomycin - pharmacy to dose         -- IV pharmacy to manage frequency 11/25/22 2312 11/26/22 0000   vancomycin (VANCOCIN) 1,000 mg in dextrose 5 % 250 mL IVPB         -- IV Every 24 hours (non-standard times) 11/25/22 2312 11/25/22 2115   mupirocin 2 % ointment         11/30 2059 Nasl 2 times daily 11/25/22 2008             Cultures were taken-   Microbiology Results (last 7 days)         Procedure Component Value Units Date/Time     Culture, Lower Respiratory [[id number]] Collected: 11/25/22 2348     Order Status: Resulted Specimen: Respiratory from Tracheal Aspirate Updated: 11/25/22 2354     Blood culture [[id number]] Collected: 11/25/22 2220     Order Status: Sent Specimen: Blood Updated: 11/25/22 2227     Blood culture [[id number]] Collected: 11/25/22 2214     Order Status: Sent Specimen: Blood Updated: 11/25/22 2227             Latest lactate reviewed, they are-       Recent Labs   Lab 11/25/22  1827 11/25/22 2037   LACTATE 6.8* 2.7*         Organ dysfunction indicated by Acute kidney injury, Acute respiratory failure, Acute liver injury and Lactate 6.8 then 2.7  Source- CAP in setting of Influenza A. Cannot rule out superimposed bacterial infection.      Source control Achieved by- droplet precautions.   - Vanc and Zosyn and Tamiflu    11/26- EF 25%; concern more shock related at present  - follow cultures- if remain negative will d/c abx

## 2022-11-27 NOTE — ASSESSMENT & PLAN NOTE
- PSI: 129 (Risk Class IV, 8.2-9.3% mortality)  - Tested positive for Influenza A on 11/23/22.  - CXR: possible R lung pneumonitis.   - CTA: No PE, possible R heart failure and possible pneumonitis.   - Tamiflu, Vanc and Zosyn.   - Xopenex Q8H and Q4H prn in the setting of A fib with RVR. Atrovent Q8H. Solumedrol 40 mg Q6H.   - Blood and sputum Cx pending  - Mechanical ventilation    11/27- continue abx; follow cultures

## 2022-11-27 NOTE — PLAN OF CARE
Problem: Adult Inpatient Plan of Care  Goal: Plan of Care Review  Outcome: Ongoing, Progressing  Goal: Patient-Specific Goal (Individualized)  Outcome: Ongoing, Progressing  Goal: Absence of Hospital-Acquired Illness or Injury  Outcome: Ongoing, Progressing  Goal: Optimal Comfort and Wellbeing  Outcome: Ongoing, Progressing  Goal: Readiness for Transition of Care  Outcome: Ongoing, Progressing     Problem: Communication Impairment (Mechanical Ventilation, Invasive)  Goal: Effective Communication  Outcome: Ongoing, Progressing     Problem: Device-Related Complication Risk (Mechanical Ventilation, Invasive)  Goal: Optimal Device Function  Outcome: Ongoing, Progressing     Problem: Inability to Wean (Mechanical Ventilation, Invasive)  Goal: Mechanical Ventilation Liberation  Outcome: Ongoing, Progressing     Problem: Nutrition Impairment (Mechanical Ventilation, Invasive)  Goal: Optimal Nutrition Delivery  Outcome: Ongoing, Progressing     Problem: Skin and Tissue Injury (Mechanical Ventilation, Invasive)  Goal: Absence of Device-Related Skin and Tissue Injury  Outcome: Ongoing, Progressing     Problem: Ventilator-Induced Lung Injury (Mechanical Ventilation, Invasive)  Goal: Absence of Ventilator-Induced Lung Injury  Outcome: Ongoing, Progressing     Problem: Communication Impairment (Artificial Airway)  Goal: Effective Communication  Outcome: Ongoing, Progressing     Problem: Device-Related Complication Risk (Artificial Airway)  Goal: Optimal Device Function  Outcome: Ongoing, Progressing     Problem: Skin and Tissue Injury (Artificial Airway)  Goal: Absence of Device-Related Skin or Tissue Injury  Outcome: Ongoing, Progressing     Problem: Noninvasive Ventilation Acute  Goal: Effective Unassisted Ventilation and Oxygenation  Outcome: Ongoing, Progressing     Problem: Adjustment to Illness (Sepsis/Septic Shock)  Goal: Optimal Coping  Outcome: Ongoing, Progressing     Problem: Bleeding (Sepsis/Septic Shock)  Goal:  Absence of Bleeding  Outcome: Ongoing, Progressing     Problem: Glycemic Control Impaired (Sepsis/Septic Shock)  Goal: Blood Glucose Level Within Desired Range  Outcome: Ongoing, Progressing     Problem: Infection Progression (Sepsis/Septic Shock)  Goal: Absence of Infection Signs and Symptoms  Outcome: Ongoing, Progressing     Problem: Nutrition Impaired (Sepsis/Septic Shock)  Goal: Optimal Nutrition Intake  Outcome: Ongoing, Progressing     Problem: Fluid and Electrolyte Imbalance (Acute Kidney Injury/Impairment)  Goal: Fluid and Electrolyte Balance  Outcome: Ongoing, Progressing     Problem: Oral Intake Inadequate (Acute Kidney Injury/Impairment)  Goal: Optimal Nutrition Intake  Outcome: Ongoing, Progressing     Problem: Renal Function Impairment (Acute Kidney Injury/Impairment)  Goal: Effective Renal Function  Outcome: Ongoing, Progressing     Problem: Infection  Goal: Absence of Infection Signs and Symptoms  Outcome: Ongoing, Progressing     Problem: Skin Injury Risk Increased  Goal: Skin Health and Integrity  Outcome: Ongoing, Progressing

## 2022-11-27 NOTE — NURSING
1433 - Seizure activity noted. Eyes dilated to 4 mm - sluggish, minimal constriction, left arm twitching.  Versed increased per Lyssa Quinonez NP, at bedside. Ativan given 1445. , decrease in Oxygen sat on monitor, FiO2 increased to 100%, Mouth and airway suctioned. Eyes return to 2 mm, body still. End activity: 1447    1455 - Seizure activity noted. Eyes to 4mm, sluggish, minimal constriction. Left arm twitching, tongue biting, drool. Oxygen sat no longer reading on monitor. FiO2 remains at 100%.  Ativan given  per orders. Mouth and airway suctioned. Bite block placed. Intermittent seizure activity continues. Unilateral Eye deviation noted. Blood pressure cuff not reading accurately. Art line BP on monitor remains appropriate.  Oxygen sat probes attempted placement on various areas, no reliable reading.      1730: Continued noted intermittent seizure activity. Ativan given per orders.  Lyssa Quinonez notified.  Diprivan ordered and initiated.Pupils at 3 mm and sluggish to 2mm, arm twitching minimal.

## 2022-11-27 NOTE — ASSESSMENT & PLAN NOTE
- Undifferentiated shock likely cardiogenic as EF is 25% and presence of severe RV failure.   - Continue antibiotics x 48 hours. Discontinue antibiotic once blood Cx negative.  - dopamine and levophed infusion

## 2022-11-27 NOTE — ASSESSMENT & PLAN NOTE
Patient with Hypercapnic and Hypoxic Respiratory failure which is Acute.  Whether he is on home oxygen is unknown. Supplemental oxygen was provided and noted- Vent Mode: A/C  Oxygen Concentration (%):  [50] 50  Resp Rate Total:  [18 br/min-31 br/min] 18 br/min  Vt Set:  [400 mL-500 mL] 500 mL  PEEP/CPAP:  [5 cmH20] 5 cmH20.   Signs/symptoms of respiratory failure include- tachypnea and unresponsiveness. Contributing diagnoses includes - COPD, Pneumonia and cardiac arrest Labs and images were reviewed. Patient Has recent ABG, which has been reviewed. Will treat underlying causes and adjust management of respiratory failure as follows- mechanical ventilation, steroids, xopenex nebs and Atrovent Q8H.   11/26- post code abgs stable; wean FiO2 to maintain sats >90   11/27- stable at present; will continue to rest until TTM protocol has ended then will attempt CPAP trials

## 2022-11-27 NOTE — SUBJECTIVE & OBJECTIVE
Interval History: Pt remains intubated and sedated. Started having seizure like activity last night- another episode today-- increased keppra with ativan prn.   Adult children (daughter and son) have arrived and are up to date on plan and goals of care- pt is to remain a full code per their wishes.     Objective:     Vital Signs (Most Recent):  Temp: (!) 95.4 °F (35.2 °C) (11/27/22 1405)  Pulse: 73 (11/27/22 1405)  Resp: 18 (11/27/22 1405)  BP: 105/73 (11/27/22 1400)  SpO2: 100 % (11/27/22 1405)   Vital Signs (24h Range):  Temp:  [92.8 °F (33.8 °C)-96.6 °F (35.9 °C)] 95.4 °F (35.2 °C)  Pulse:  [] 73  Resp:  [7-19] 18  SpO2:  [89 %-100 %] 100 %  BP: ()/() 105/73     Weight: 52.8 kg (116 lb 6.5 oz)  Body mass index is 16.7 kg/m².      Intake/Output Summary (Last 24 hours) at 11/27/2022 1501  Last data filed at 11/27/2022 1400  Gross per 24 hour   Intake 1117.55 ml   Output 1600 ml   Net -482.45 ml       Physical Exam  Vitals and nursing note reviewed.   Constitutional:       General: He is not in acute distress.     Appearance: He is ill-appearing. He is not diaphoretic.   HENT:      Head: Normocephalic and atraumatic.      Right Ear: External ear normal.      Left Ear: External ear normal.      Nose: Nose normal.   Eyes:      Pupils: Pupils are equal, round, and reactive to light.   Cardiovascular:      Rate and Rhythm: Rhythm irregular.   Abdominal:      General: Bowel sounds are normal. There is no distension.      Palpations: Abdomen is soft.   Musculoskeletal:         General: No swelling.      Right lower leg: No edema.      Left lower leg: No edema.   Skin:     General: Skin is cool and dry.      Capillary Refill: Capillary refill takes more than 3 seconds.      Comments: TTM ogoing  Old L groin incision site    Neurological:      Comments: Intubated and sedated    Psychiatric:      Comments: Intubated and sedated          Vents:  Vent Mode: A/C (11/27/22 1100)  Ventilator Initiated: Yes  (11/25/22 1730)  Set Rate: 18 BPM (11/27/22 1100)  Vt Set: 550 mL (11/27/22 1100)  PEEP/CPAP: 5 cmH20 (11/27/22 1100)  Oxygen Concentration (%): 50 (11/27/22 1100)  Peak Airway Pressure: 20 cmH20 (11/27/22 1100)  Total Ve: 9.3 L/m (11/27/22 1100)  F/VT Ratio<105 (RSBI): (!) 34.62 (11/27/22 1100)    Lines/Drains/Airways       Central Venous Catheter Line  Duration             Percutaneous Central Line Insertion/Assessment - Triple Lumen  11/26/22 1400 right internal jugular 1 day              Drain  Duration                  NG/OG Tube 11/25/22 2330 Center mouth 1 day         Urethral Catheter 11/25/22 1842 Temperature probe 16 Fr. 1 day              Airway  Duration                  Airway - Non-Surgical 11/25/22 2 days              Arterial Line  Duration             Arterial Line 11/26/22 1600 Left Radial <1 day              Peripheral Intravenous Line  Duration                  External Jugular IV 11/25/22 1834 1 day         Peripheral IV - Single Lumen 11/25/22 1735 16 G Anterior;Left Forearm 1 day                    Significant Labs:    CBC/Anemia Profile:  Recent Labs   Lab 11/25/22  1739 11/26/22  0109 11/26/22  0440 11/27/22  0232   WBC 7.57  --  9.74 12.78*   HGB 13.9  --  12.9* 11.3*   HCT 45.1 45 41.2 34.1*   *  --  217 262   MCV 95.1  --  93.0 88.6   RDW 14.1  --  14.5 13.9        Chemistries:  Recent Labs   Lab 11/25/22  1739 11/25/22  2133 11/26/22  0016 11/27/22  0007 11/27/22  0750 11/27/22  1225    139   < > 139 137 138   K 5.0 5.6*   < > 3.9 3.7 3.9   CL 99 103   < > 103 103 102   CO2 25 30   < > 28 27 28   BUN 26* 28*   < > 34* 35* 37*   CREATININE 1.34* 1.46*   < > 1.51* 1.71* 1.90*   CALCIUM 8.3* 7.9*   < > 7.9* 7.7* 7.6*   ALBUMIN 2.6* 2.4*  --   --   --  1.9*   PROT 6.1* 6.2*  --   --   --  5.1*   BILITOT 0.4 0.7  --   --   --  0.5   ALKPHOS 87 107  --   --   --  89   ALT 85* 122*  --   --   --  76*   * 231*  --   --   --  62*   MG 2.2  --    < > 2.7* 2.6* 2.6*   PHOS  --    --    < > 4.7* 4.9* 5.3*    < > = values in this interval not displayed.       All pertinent labs within the past 24 hours have been reviewed.    Significant Imaging:  I have reviewed all pertinent imaging results/findings within the past 24 hours.

## 2022-11-27 NOTE — ASSESSMENT & PLAN NOTE
Etiology ACS vs prolonged chest compressions (20 min) vs type 2 MI 2/2 severe sepsis and MILY.   - LIONEL: 49 (intermediate/ high risk). Heart: 8 (high). Ana: 156 (25% probability of death from admission to 6 months).  - EKG: A fib with . T wave inversions in precordial leads. Repeat pending.   - Troponin 330-->295. Repeat pending.  - Hold home BB 2/2 UDS positive for cocaine. Start Cardizem  mg.  - Start ASA, Atorvastatin 80 mg. No ACEi/ARB 2/2 MILY and low BP.   - Echo with EF 25%  - heparin infusion   - Cardiology consulted.   11/27- plans for [facility] tomorrow

## 2022-11-27 NOTE — ASSESSMENT & PLAN NOTE
Patient with atrial fibrillation which is controlled currently with Beta Blocker. Patient is currently in atrial fibrillation.[id number] Score: 1. HASBLED Score: >3 (high bleed risk). Anticoagulation indicated. Anticoagulation done with Eliquis. Type of A fib is unknown. All prior EKGs review, with none of them reflecting A fib.   - Discontinue BB 2/2 UDS positive for cocaine. Start Cardizem  mg.    11/26  - after discussion with Dr Purvis-- d/c cardizem- digoxin 250mcg x 1 with amiodarone infusion protocol   11/27- controlled afib at present

## 2022-11-28 PROBLEM — G93.1 HYPOXIC ENCEPHALOPATHY: Status: ACTIVE | Noted: 2022-01-01

## 2022-11-28 NOTE — CONSULTS
" [facility] Consult History and Physical   Patient Name: Joao Chi Sr  MRN: [id number]  Age: 67 y.o.  : 1955  Time:  2:29 PM  Admission Date: 2022    Consulted for:  MILY  Consulted by: Dr. Whaley     HPI:   Joao Chi Sr is a 68 yo male with medical history significant for COPD, HTN, A fib who presents to the hospital on  with CP and SOB. History obtained via chart review due to patient being intubated and sedated. Shortly after arrival, patient experienced cardiac arrest. ROSC obtained after 20 min. Patient has since been found to have shock suspected to be septic in nature as well as MILY. Also noted to have UDS positive for cocaine benzodiazepines on arrival. Nephrology consulted for oliguric MILY.       Past Medical History:  has a past medical history of A-fib, Colitis, COPD (chronic obstructive pulmonary disease), High cholesterol, and Hypertension.     Past Surgical History:   has a past surgical history that includes Back surgery; Wrist surgery; Colonoscopy (N/A, 2018); and Olecranon bursectomy (Left, 2022).     Family History:  family history includes Heart failure in his mother; No Known Problems in his brother, brother, father, and sister. No family history of kidney disease.     Social History:   reports that he has been smoking cigarettes. He has a 40.00 pack-year smoking history. He has never used smokeless tobacco. He reports current alcohol use of about 1.0 standard drink per week. He reports current drug use. Drug: Cocaine.     Allergies: has No Known Allergies.     Medications prior to admission: Reviewed     Old records have been reviewed.       Review of Systems  ROS: A 10 point ROS was unable to be completed as patient is intubated and sedated        Physical Exam:   BP (!) 90/50   Pulse 87   Temp (!) 95.5 °F (35.3 °C)   Resp 18   Ht 5' 10" (1.778 m)   Wt 53.4 kg (117 lb 11.6 oz)   SpO2 (!) 93%   BMI 16.89 " kg/m²     Constitutional: lying in bed, critically ill  Eyes: EOMI, white sclera  ENMT: moist mucus membranes, nares patent  Cardiovascular: normal rate, S1/S2 noted, no edema  Respiratory: symmetrical chest expansion, CTA-B  Gastrointestinal: +BS, soft, NT/ND  Musculoskeletal: normal, no joint erythema/effusions  Skin: no rash, no purpura, warm extremities  Neurological: intubated, sedated    Data Review:  Lab:   Labs reviewed and significant values discussed below.    Recent Labs     11/27/22  0750 11/27/22  1225 11/27/22 2019 11/28/22  0752   CALCIUM 7.7* 7.6*  --  7.7*    138  --  136   K 3.7 3.9  --  3.8    102  --  99   CO2 27 28  --  28   BUN 35* 37*  --  44*   CREATININE 1.71* 1.90*  --  2.51*   * 160* 158* 167*       Imaging:  Reviewed     Assessment/Plan:     Patient Active Problem List   Diagnosis    SOB (shortness of breath) on exertion    Follow up    Chest pain    Stage 2 moderate COPD by GOLD classification    HTN (hypertension)    HLD (hyperlipidemia)    Drug abuse    Alcohol abuse    Tobacco abuse    Right upper quadrant abdominal pain    Elbow swelling, left    Constipation    Black stools    Olecranon bursitis of left elbow    Left lower quadrant abdominal pain    Abdominal pain    Hernia of abdominal wall    Weight loss    Unilateral inguinal hernia without obstruction or gangrene    Abdominal wall seroma    Cardiac arrest    Acute respiratory failure with hypoxia and hypercarbia    Severe sepsis    MILY (acute kidney injury)    Pneumonia due to influenza A virus    COPD exacerbation    NSTEMI (non-ST elevated myocardial infarction)    Atrial fibrillation with RVR    D-dimer, elevated    Biventricular heart failure with reduced left ventricular function    Shock    Cardiomyopathy    Hypoxic encephalopathy         MILY  - Baseline cr 0.-91  - MILY in setting of hypoperfusion, possible ATN from recent cardiac arrest  - Patient has been getting diuresed the past few days. I will  give gentle NS 1 L today  - Electrolytes stable. No urgent need for RRT.  - Please avoid nephrotoxic agents/NSAIDs  - Renally dose all medications   - Please obtain daily BMP, Mg, and Phos levels  - Please monitor strict UOP  - Daily weights    Thank you for the consult. Will follow along. Please call with questions.    Diamond S. Piero, DO  [facility]   11/28/2022

## 2022-11-28 NOTE — NURSING
0100  and  reached out to about rewarming pt due to being cool for 24hr. Agreed that rewarming can be initiated per protocol.    0200 secure chatted  and  about pt output being less than 100. Metolazone ordered to start in the morning.

## 2022-11-28 NOTE — ASSESSMENT & PLAN NOTE
Echo    Interpretation Summary  · Atrial fibrillation observed.  · The left ventricle is normal in size with mild concentric hypertrophy and severely decreased systolic function.  · The estimated ejection fraction is 25%.  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  · Severe right atrial enlargement.  · Moderate-to-severe mitral regurgitation.  · Mechanically ventilated; cannot use inferior caval vein diameter to estimate central venous pressure.    Continue lasix   Plans for [facility]

## 2022-11-28 NOTE — ASSESSMENT & PLAN NOTE
Currently being ventilated as a consequence of having acute respiratory failure after cardiac arrest

## 2022-11-28 NOTE — PROCEDURES
Bedside [place] placement.     Sterile wipe and dressed. Right IJ accessed under US guidance using micropuncture kit. 7 Fr. Sheath inserted. Unable to get a good waveform to float the swan. Xray done at bedside revealed RV placement of the swan, hence the swan removed and sheath sutured in.     Plan:  Transduce CVP pressures via 7 Fr. sheath.  Can consider floating swan in cath lab tomorrow.   Start IV dobutamine at 5mcg/kg/min    Ananda Broussard  11/28/2022

## 2022-11-28 NOTE — PROGRESS NOTES
[facility]  Pulmonology  Progress Note    Patient Name: Joao Chi Sr  MRN: [id number]  Admission Date: 11/25/2022  Hospital Length of Stay: 3 days  Code Status: Full Code  Attending Provider: Parmjit Whaley MD  Primary Care Provider: Cornelia Weiss MD   Principal Problem: Cardiac arrest    Subjective:     Interval History:  Unresponsive    Objective:     Vital Signs (Most Recent):  Temp: 96.1 °F (35.6 °C) (11/28/22 0500)  Pulse: 74 (11/28/22 0500)  Resp: 18 (11/28/22 0500)  BP: 105/64 (11/28/22 0400)  SpO2: 100 % (11/28/22 0500) Vital Signs (24h Range):  Temp:  [92.7 °F (33.7 °C)-96.4 °F (35.8 °C)] 96.1 °F (35.6 °C)  Pulse:  [] 74  Resp:  [12-53] 18  SpO2:  [58 %-100 %] 100 %  BP: ()/() 105/64     Weight: 53.4 kg (117 lb 11.6 oz)  Body mass index is 16.89 kg/m².      Intake/Output Summary (Last 24 hours) at 11/28/2022 0617  Last data filed at 11/28/2022 0558  Gross per 24 hour   Intake 822.19 ml   Output 450 ml   Net 372.19 ml       Physical Exam  Vitals reviewed.   Constitutional:       Appearance: Normal appearance.      Interventions: He is intubated.   HENT:      Head: Normocephalic and atraumatic.      Nose: Nose normal.      Mouth/Throat:      Mouth: Mucous membranes are dry.      Pharynx: Oropharynx is clear.   Eyes:      Extraocular Movements: Extraocular movements intact.      Conjunctiva/sclera: Conjunctivae normal.      Pupils: Pupils are equal, round, and reactive to light.   Cardiovascular:      Rate and Rhythm: Normal rate.      Heart sounds: Normal heart sounds. No murmur heard.  Pulmonary:      Effort: Pulmonary effort is normal. He is intubated.      Breath sounds: Normal breath sounds.   Abdominal:      General: Abdomen is flat. Bowel sounds are normal.      Palpations: Abdomen is soft.   Musculoskeletal:         General: Normal range of motion.      Cervical back: Normal range of motion and neck supple.      Right lower leg: No edema.      Left  lower leg: No edema.   Skin:     General: Skin is warm and dry.      Capillary Refill: Capillary refill takes less than 2 seconds.   Neurological:      General: No focal deficit present.      Mental Status: He is alert and oriented to person, place, and time.   Psychiatric:         Mood and Affect: Mood normal.         Behavior: Behavior normal.       Vents:  Vent Mode: A/C (11/28/22 0459)  Ventilator Initiated: Yes (11/25/22 1730)  Set Rate: 18 BPM (11/28/22 0459)  Vt Set: 550 mL (11/28/22 0459)  PEEP/CPAP: 5 cmH20 (11/28/22 0459)  Oxygen Concentration (%): 60 (weaned to 60%) (11/28/22 0400)  Peak Airway Pressure: 21 cmH20 (11/28/22 0459)  Total Ve: 9.3 L/m (11/28/22 0459)  F/VT Ratio<105 (RSBI): (!) 34.62 (11/28/22 0027)    Lines/Drains/Airways       Central Venous Catheter Line  Duration             Percutaneous Central Line Insertion/Assessment - Triple Lumen  11/26/22 1400 right internal jugular 1 day              Drain  Duration                  NG/OG Tube 11/25/22 2330 Center mouth 2 days         Urethral Catheter 11/25/22 1842 Temperature probe 16 Fr. 2 days              Airway  Duration                  Airway - Non-Surgical 11/25/22 3 days              Arterial Line  Duration             Arterial Line 11/26/22 1600 Left Radial 1 day              Peripheral Intravenous Line  Duration                  External Jugular IV 11/25/22 1834 2 days         Peripheral IV - Single Lumen 11/25/22 1735 16 G Anterior;Left Forearm 2 days         Peripheral IV - Single Lumen 11/27/22 2230 16 G Anterior;Right Forearm <1 day         Peripheral IV - Single Lumen 11/27/22 2235 18 G Anterior;Proximal;Right Forearm <1 day                    Significant Labs:    CBC/Anemia Profile:  Recent Labs   Lab 11/27/22  0232   WBC 12.78*   HGB 11.3*   HCT 34.1*      MCV 88.6   RDW 13.9        Chemistries:  Recent Labs   Lab 11/27/22  0007 11/27/22  0750 11/27/22  1225 11/27/22  2342    137 138  --    K 3.9 3.7 3.9  --    CL  103 103 102  --    CO2 28 27 28  --    BUN 34* 35* 37*  --    CREATININE 1.51* 1.71* 1.90*  --    CALCIUM 7.9* 7.7* 7.6*  --    ALBUMIN  --   --  1.9*  --    PROT  --   --  5.1*  --    BILITOT  --   --  0.5  --    ALKPHOS  --   --  89  --    ALT  --   --  76*  --    AST  --   --  62*  --    MG 2.7* 2.6* 2.6* 2.6*   PHOS 4.7* 4.9* 5.3* 6.9*       Recent Lab Results         11/27/22  2342   11/27/22  2019   11/27/22  1225   11/27/22  0750        Albumin     1.9         Alkaline Phosphatase     89         ALT     76         Anion Gap     12   11       aPTT       71.3       AST     62         Bilirubin, Direct     0.2         BILIRUBIN TOTAL     0.5         BUN     37   35       BUN/CREAT RATIO     19   20       Calcium     7.6   7.7       Chloride     102   103       CO2     28   27       Creatinine     1.90   1.71       eGFR     38   43       Glucose   158   160   145       Lactate, Patricio     1.9         Magnesium 2.6     2.6   2.6       Phosphorus 6.9     5.3   4.9       Potassium     3.9   3.7       PROTEIN TOTAL     5.1         Sodium     138   137               Significant Imaging:  I have reviewed all pertinent imaging results/findings within the past 24 hours.    Assessment/Plan:     * Cardiac arrest  Possible etiologies include:   - Ischemic heart disease including ACS: Although troponin elevated, the likelihood of ACS is unknown as pt received prolonged chest compressions (20 min). EKG did not show ST deviations. Echo ejection fraction 25%  - Hypoxemia: Pt with COPDe and PNA 2/2 influenza A with superimposed bacterial infection , which cannot be ruled out.   - Valvular heart disease or Hypertrophic CM: Echo pending.   - Arrhythmia: EKG shows A fib with RVR.   - Prolonged QT: EKG with QTc 459.   - Cocaine-induced arrhythmias or MI: UDS cocaine positive     Unlikely etiologies include:  - Intracranial hemorrhage: Unlikely as CT head unremarkable   - PE: CTA showed no PE. Venous doppler normal  - Aortic dissection:  No significant widened mediastinal on CXR.   - Acute pericardial tamponade: Not reflected on EKG or CTA.   - Pneumothorax: CXR blackman snot reflect pneumothorax.      Patient is currently being warmed he is going to have possibly a heart catheterization today    Acute respiratory failure with hypoxia and hypercarbia  Currently being ventilated as a consequence of having acute respiratory failure after cardiac arrest    Hypoxic encephalopathy  Has received hypothermia for 24 hours but had a prolonged code    MILY (acute kidney injury)  Seems to be doing worse making some urine on a dopamine infusion as well as Levophed    Biventricular heart failure with reduced left ventricular function    Echo    Interpretation Summary  · Atrial fibrillation observed.  · The left ventricle is normal in size with mild concentric hypertrophy and severely decreased systolic function.  · The estimated ejection fraction is 25%.  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  · Severe right atrial enlargement.  · Moderate-to-severe mitral regurgitation.  · Mechanically ventilated; cannot use inferior caval vein diameter to estimate central venous pressure.    Continue lasix   Plans for [facility]     NSTEMI (non-ST elevated myocardial infarction)  Etiology ACS vs prolonged chest compressions (20 min) vs type 2 MI 2/2 severe sepsis and MILY.   - - plans for [facility] today     Severe sepsis  No cultures positive at this time    Hernia of abdominal wall  - s/p repair on 11/14 no evidence of DVT or PE    Drug abuse  - UDS positive for cocaine and benzos     Spent 35 minutes critical care time             Parmjit Whaley MD  Pulmonology  [facility]

## 2022-11-28 NOTE — ASSESSMENT & PLAN NOTE
Possible etiologies include:   - Ischemic heart disease including ACS: Although troponin elevated, the likelihood of ACS is unknown as pt received prolonged chest compressions (20 min). EKG did not show ST deviations. Echo ejection fraction 25%  - Hypoxemia: Pt with COPDe and PNA 2/2 influenza A with superimposed bacterial infection , which cannot be ruled out.   - Valvular heart disease or Hypertrophic CM: Echo pending.   - Arrhythmia: EKG shows A fib with RVR.   - Prolonged QT: EKG with QTc 459.   - Cocaine-induced arrhythmias or MI: UDS cocaine positive     Unlikely etiologies include:  - Intracranial hemorrhage: Unlikely as CT head unremarkable   - PE: CTA showed no PE. Venous doppler normal  - Aortic dissection: No significant widened mediastinal on CXR.   - Acute pericardial tamponade: Not reflected on EKG or CTA.   - Pneumothorax: CXR blackman snot reflect pneumothorax.      Patient is currently being warmed he is going to have possibly a heart catheterization today

## 2022-11-28 NOTE — ASSESSMENT & PLAN NOTE
Etiology ACS vs prolonged chest compressions (20 min) vs type 2 MI 2/2 severe sepsis and MIYL.   - - plans for [facility] today

## 2022-11-28 NOTE — PROGRESS NOTES
"[facility]  Adult Nutrition  Consult Note         Reason for Assessment  Reason For Assessment: consult (tube feeding recommendations requested during interdisciplinary rounds)  Nutrition Risk Screen: no indicators present    RD consult - tube feed recommendations requested during interdisciplinary rounds.     Recommend initiate Suplena 1.8 tube feeding at 10ml/hr and advance up 10ml q6-8 hours as medically appropriate and tolerated. Goal rate Suplena 1.8 @45ml/hr provides 1944 kcal (meets 104% of energy needs), 49g protein (meets 100% of protein needs), and 724ml water. Recommend free water flush 40ml/hr, providing 960 ml. Formula and FWF provides 1684ml total. With diprivan at 1.6ml/hr providing 42kcal, total kcal would be 1986 kcal (meets 106% of energy needs). Keep HOB 35-45 degrees, monitor for abdominal pain/distention, n/v/c/d, gastric residuals >500ml.    RD secure chat FNP regarding tube feeding recommendations; per FNP tube feeding can start after heart cath this evening.     Per MD note, "Patient is a 67-year-old male who presented to the emergency room after an witnessed out of the hospital cardiac arrest with non shockable rhythm.  From the time of cardiac arrest to ROSC was approximately 20 minutes.  Troponin was elevated and EKG showed T-wave inversion in precordial leads with no previous EKG for comparison.  Feel that elevated troponin level was due to prolonged CPR and not necessarily due to ACS.  Cause of cardiac arrest is unknown at this time. Cardiology has been consulted and will proceed with echocardiogram.   Due to the prolonged resuscitative time, presence of anoxic brain injury was likely and thus patient was started on targeted temperature management protocol.  Poor prognosis overall."    Patient # is considered underweight and protein-energy malnutrition grade II, needs adjusted. Patient with MILY, severe sepsis - protein needs adjusted. RD will continue to " monitor renal labs and adjust protein recommendations as appropriate. Patient NPO - intubated and sedated.    Last BM 11/26 per flowsheets. Will continue to monitor tube feeding initiation/tolerance/adequacy, weight trend, meds, labs, updates in patient condition. RD following.     Malnutrition  RD to perform NFPE when appropriate.    Nutrition Diagnosis  Altered Nutrition Related Lab Values   related to Renal dysfunction as evidenced by elevated BUN/creatinine levels, lowered eGFR level    Nutrition Diagnosis Status: New    Nutrition Risk  Level of Risk/Frequency of Follow-up: high   Chewing or Swallowing Difficulty?: patient intubated and sedated    Estimated/Assessed Needs  Total Ve: 9.4 L/m Temp: 97.2 °F (36.2 °C)Rectal  Weight Used For Calorie Calculations: 53.4 kg (117 lb 11.6 oz)   Energy Need Method: Kcal/kg (30-35 kcal/kg) Energy Calorie Requirements (kcal): 1464-9052 kcal  Weight Used For Protein Calculations: 53.4 kg (117 lb 11.6 oz)  Protein Requirements: 43-54g pro (0.8-1.0g pro/kg)  Estimated Fluid Requirement Method: RDA Method    RDA Method (mL): 1602     Nutrition Prescription / Recommendations  Recommendation/Intervention: Recommend initiate Suplena 1.8 tube feeding at 10ml/hr and advance up 10ml q6-8 hours as medically appropriate and tolerated. Goal rate Suplena 1.8 @45ml/hr provides 1944 kcal (meets 104% of energy needs), 49g protein (meets 100% of protein needs), and 724ml water. Recommend free water flush 40ml/hr, providing 960 ml. Formula and FWF provides 1684ml total. With diprivan at 1.6ml/hr providing 42kcal, total kcal would be 1986 kcal (meets 106% of energy needs). Keep HOB 35-45 degrees, monitor for abdominal pain/distention, n/v/c/d, gastric residuals >500ml.  Goals: TF initiation/tolerance/adequacy, weight maintenance/gradual weight gain  Nutrition Goal Status: new  Current Diet Order: NPO  Nutrition Order Comments: Patient is intubated and sedated  Recommended Diet: Enteral  Nutrition  Recommended Oral Supplement: No Oral Supplements  Is Nutrition Support Recommended: Yes     Needs Calculated  Energy Need Method: Kcal/kg (30-35 kcal/kg) Energy Calorie Requirements (kcal): 6125-5128 kcal  Protein Requirements: 43-54g pro (0.8-1.0g pro/kg)  Enteral Nutrition   Enteral Nutrition Formula Provides:  1944 kcals Propofol Rate: Yes Propofol Rate 1.6 ml/h Provides 42 kcals  1986  Total Kcals  49 g Protein  210 g Carbohydrates  105 g Fat Propofol Rate: Yes Propofol Provides 5 g Fat  110 g Total Fat  724 ml Fluid without Flush    960 ml Fluid by flush   1684 ml Total Fluid  Enteral Nutrition Recommended Order:  Tube feeding via Oral Gastric  Tube feeding formula: Suplena 1.8 Continuous 45 ml/h  Free Water Flush: 40 ml hourly  Modular Supplements:No Modular Supplements needed  Enteral Nutrition meets needs?: yes  Enteral Nutrition Status: Recommended but Not Ordered  Is Education Recommended: No    Monitor and Evaluation  % current Intake: NPO  % intake to meet estimated needs: Enteral Nutrition   Food and Nutrient Intake: enteral nutrition intake  Food and Nutrient Adminstration: enteral and parenteral nutrition administration  Knowledge/Beliefs/Attitudes: beliefs and attitudes, food and nutrition knowledge/skill  Physical Activity and Function: nutrition-related ADLs and IADLs, factors affecting access to physical activity  Anthropometric Measurements: weight, weight change, body mass index  Biochemical Data, Medical Tests and Procedures: electrolyte and renal panel, gastrointestinal profile, glucose/endocrine profile, inflammatory profile, lipid profile  Nutrition-Focused Physical Findings: overall appearance, extremities, muscles and bones, head and eyes, skin     Current Medical Diagnosis and Past Medical History  Diagnosis: other (see comments) (cardiac arrest)  Past Medical History:   Diagnosis Date    A-fib     Colitis     COPD (chronic obstructive pulmonary disease)     High cholesterol   "   Hypertension      Nutrition/Diet History  Food Allergies: [facility]    Lab/Procedures/Meds  Recent Labs   Lab 11/28/22  0752 11/28/22  1130     --    K 3.8  --    BUN 44*  --    CREATININE 2.51*  --    *  --    CALCIUM 7.7*  --    ALBUMIN 1.9*  --    CL 99  --    ALT 69*  --    AST 47*  --    PHOS 6.7* 7.1*     Last A1c:   Lab Results   Component Value Date    HGBA1C 5.9 11/26/2022    HGBA1C 5.7 (H) 06/19/2019     Lab Results   Component Value Date    RBC 2.64 (L) 11/28/2022    HGB 7.8 (L) 11/28/2022    HCT 23.5 (L) 11/28/2022    MCV 89.0 11/28/2022    MCH 29.5 11/28/2022    MCHC 33.2 11/28/2022     Pertinent Labs Reviewed: reviewed  Pertinent Labs Comments: BUN 44 (H), creatinine 2.51 (H), eGFR 27 (L), Ca 7.7 (L), Phos 7.1 (H), Mg 2.4 (H), total protein 5.4 (L), albumin 1.9 (L) - patient with MILY   (H) - patient with no PMH of DM, elevated GLU likely r/t stress response, norepinephrine  AST 47 (H), ALT 69 (H)  CRP 9.23 (H) - patient with cardiac arrest  Pertinent Medications Reviewed: reviewed  Pertinent Medications Comments: aspirin, atorvastatin, famotidine, furosemide, gabapentin, ipratropium, levalbuterol, levetiracetam, methylprednisolone, metolazone, mupirocin, oseltamivir, zosyn, vancocin, dopamine, heparin, midazolam, norepinephrine, propofol (1.6ml/hr)    Anthropometrics  Temp: 97.2 °F (36.2 °C)  Height: 5' 10" (177.8 cm)  Height (inches): 70 in  Weight Method: Bed Scale  Weight: 53.4 kg (117 lb 11.6 oz)  Weight (lb): 117.73 lb  Ideal Body Weight (IBW), Male: 166 lb  BMI (Calculated): 16.9  BMI Grade: 16 - 16.9 protein-energy malnutrition grade II     Nutrition by Nursing  Diet/Nutrition Received: NPO  Last Bowel Movement: 11/26/22    Nutrition Follow-Up  RD Follow-up?: Yes  "

## 2022-11-29 NOTE — ASSESSMENT & PLAN NOTE
Unable to do heart catheterization due to worsening kidney function may consider putting a right heart catheter in today

## 2022-11-29 NOTE — PLAN OF CARE
[facility] ICU  Initial Discharge Assessment       Primary Care Provider: Cornelia Weiss MD    Admission Diagnosis: Cardiac arrest [I46.9]  Cardiopulmonary arrest [I46.9]  Influenza [J11.1]    Admission Date: 11/25/2022  Expected Discharge Date:     Discharge Barriers Identified: None    Payor: [employer] MANAGED MEDICARE / Plan: [employer] MEDICARE PPO / Product Type: Medicare Advantage /     Extended Emergency Contact Information  Primary Emergency Contact: Judie Mendieta  Mobile Phone: [phone]  Relation: Daughter  Preferred language: English  Secondary Emergency Contact: AlonJoao Jr  Mobile Phone: [phone]  Relation: Son  Preferred language: English    Discharge Plan A: Skilled Nursing Facility  Discharge Plan B: Skilled Nursing Facility      [id number] - [place], [place] - [address] [facility] [address]  [address]  [facility] [address]  Phone: [phone] Fax: [phone]    [facility] - [place], [place] - [address]  [address]  [facility] [address]  Phone: [phone] Fax: [phone]    [facility] [place], [place] - [address]  [address]  [facility] [address]  Phone: [phone] Fax: [phone]      Initial Assessment (most recent)       Adult Discharge Assessment - 11/29/22 1116          Discharge Assessment    Assessment Type Discharge Planning Assessment     Confirmed/corrected address, phone number and insurance Yes     Source of Information family     If unable to respond/provide information was family/caregiver contacted? Yes     Contact Name/Number Judie Mendieta- daughter     Communicated MESSI with patient/caregiver Date not available/Unable to determine     Lives With alone     Do you expect to return to your current living situation? Yes     Do you have help at home or someone to help you manage your care at home? Yes     Prior to hospitilization cognitive status: Unable to Assess     Current cognitive  status: Unable to Assess     Walking or Climbing Stairs Difficulty none     Patient currently being followed by outpatient case management? No     Do you currently have service(s) that help you manage your care at home? No     Discharge Plan A Skilled Nursing Facility     Discharge Plan B Skilled Nursing Facility     DME Needed Upon Discharge  none     Discharge Plan discussed with: Adult children     Discharge Barriers Identified None                      SS spoke with pt's daughter on phone. She knew pt lives in low income housing in [place], but unsure of many details. SS IM obtained and she said he would likely need SNF at dsicharge. SS will follow

## 2022-11-29 NOTE — ASSESSMENT & PLAN NOTE
Echo    Interpretation Summary  · Atrial fibrillation observed.  · The left ventricle is normal in size with mild concentric hypertrophy and severely decreased systolic function.  · The estimated ejection fraction is 25%.  · There are segmental left ventricular wall motion abnormalities; apex is akinetic as welll as anterior and kyia-septal wall is akinetic (LAD infarct vs. Takasubo)  · Moderate right ventricular enlargement with severely reduced right ventricular systolic function.  · Severe right atrial enlargement.  · Moderate-to-severe mitral regurgitation.  · Mechanically ventilated; cannot use inferior caval vein diameter to estimate central venous pressure.    Continue lasix   Plans for [facility]

## 2022-11-29 NOTE — NURSING
Cath team at bedside titrating drips at this time. Bp elevated see vitals from 1633 to 1810. Pt levophed drip found to be runnung at 1.8mcg/kg/hr and will be titrated back down to protocol settings.

## 2022-11-29 NOTE — PROGRESS NOTES
[facility] ICU  Pulmonology  Progress Note    Patient Name: Joao Chi Sr  MRN: [id number]  Admission Date: 11/25/2022  Hospital Length of Stay: 4 days  Code Status: Full Code  Attending Provider: Parmjit Whaley MD  Primary Care Provider: Cornelia Weiss MD   Principal Problem: Cardiac arrest    Subjective:     Interval History:  Patient unresponsive    Objective:     Vital Signs (Most Recent):  Temp: 96.6 °F (35.9 °C) (11/29/22 0715)  Pulse: 98 (11/29/22 0733)  Resp: 18 (11/29/22 0733)  BP: (!) 141/75 (11/29/22 0715)  SpO2: 95 % (11/29/22 0733)   Vital Signs (24h Range):  Temp:  [94.3 °F (34.6 °C)-98.4 °F (36.9 °C)] 96.6 °F (35.9 °C)  Pulse:  [] 98  Resp:  [12-36] 18  SpO2:  [63 %-100 %] 95 %  BP: ()/() 141/75     Weight: 60 kg (132 lb 4.4 oz)  Body mass index is 18.98 kg/m².      Intake/Output Summary (Last 24 hours) at 11/29/2022 0738  Last data filed at 11/29/2022 0600  Gross per 24 hour   Intake 2362.05 ml   Output 875 ml   Net 1487.05 ml       Physical Exam  Vitals reviewed.   Constitutional:       Appearance: Normal appearance.      Interventions: He is not intubated.  HENT:      Head: Normocephalic and atraumatic.      Nose: Nose normal.      Mouth/Throat:      Mouth: Mucous membranes are dry.      Pharynx: Oropharynx is clear.   Eyes:      Extraocular Movements: Extraocular movements intact.      Conjunctiva/sclera: Conjunctivae normal.      Pupils: Pupils are equal, round, and reactive to light.   Cardiovascular:      Rate and Rhythm: Normal rate.      Heart sounds: Normal heart sounds. No murmur heard.  Pulmonary:      Effort: Pulmonary effort is normal. He is not intubated.      Breath sounds: Normal breath sounds.   Abdominal:      General: Abdomen is flat. Bowel sounds are normal.      Palpations: Abdomen is soft.   Musculoskeletal:         General: Normal range of motion.      Cervical back: Normal range of motion and neck supple.      Right lower leg: No  edema.      Left lower leg: No edema.   Skin:     General: Skin is warm and dry.      Capillary Refill: Capillary refill takes less than 2 seconds.   Neurological:      General: No focal deficit present.      Mental Status: He is alert and oriented to person, place, and time.   Psychiatric:         Mood and Affect: Mood normal.         Behavior: Behavior normal.       Vents:  Vent Mode: A/C (11/29/22 0349)  Ventilator Initiated: Yes (11/25/22 1730)  Set Rate: 18 BPM (11/29/22 0349)  Vt Set: 550 mL (11/29/22 0349)  PEEP/CPAP: 6 cmH20 (11/29/22 0349)  Oxygen Concentration (%): 40 (11/29/22 0349)  Peak Airway Pressure: 20 cmH20 (11/29/22 0349)  Total Ve: 9.4 L/m (11/29/22 0349)  F/VT Ratio<105 (RSBI): (!) 34.62 (11/28/22 1512)    Lines/Drains/Airways       Drain  Duration                  NG/OG Tube 11/25/22 2330 Center mouth 3 days         Urethral Catheter 11/25/22 1842 Temperature probe 16 Fr. 3 days         Sheath 11/28/22 1900 Left upper;lateral <1 day              Airway  Duration                  Airway - Non-Surgical 11/25/22 4 days              Arterial Line  Duration             Arterial Line 11/26/22 1600 Left Radial 2 days              Peripheral Intravenous Line  Duration                  External Jugular IV 11/25/22 1834 3 days         Peripheral IV - Single Lumen 11/27/22 2230 16 G Anterior;Right Forearm 1 day         Peripheral IV - Single Lumen 11/27/22 2235 18 G Anterior;Proximal;Right Forearm 1 day                    Significant Labs:    CBC/Anemia Profile:  Recent Labs   Lab 11/28/22  0526 11/29/22  0459   WBC 15.84* 11.51*   HGB 7.8* 6.9*   HCT 23.5* 20.7*    231   MCV 89.0 89.2   RDW 14.1 13.9        Chemistries:  Recent Labs   Lab 11/27/22  1225 11/27/22  2342 11/28/22  0752 11/28/22  1130 11/28/22  2342 11/29/22  0459     --  136  --   --  132*   K 3.9  --  3.8  --   --  3.7     --  99  --   --  95*   CO2 28  --  28  --   --  28   BUN 37*  --  44*  --   --  55*   CREATININE  1.90*  --  2.51*  --   --  3.72*   CALCIUM 7.6*  --  7.7*  --   --  7.2*   ALBUMIN 1.9*  --  1.9*  --   --  2.0*   PROT 5.1*  --  5.4*  --   --  5.3*   BILITOT 0.5  --  0.5  --   --  0.4   ALKPHOS 89  --  73  --   --  60   ALT 76*  --  69*  --   --  54   AST 62*  --  47*  --   --  46*   MG 2.6*   < > 2.6* 2.4* 2.6*  --    PHOS 5.3*   < > 6.7* 7.1* 8.3*  --     < > = values in this interval not displayed.       Recent Lab Results  (Last 5 results in the past 24 hours)        11/29/22  0459   11/28/22  2357   11/28/22  2342   11/28/22  1644   11/28/22  1229        Albumin/Globulin Ratio 0.6               Albumin 2.0               Alkaline Phosphatase 60               ALT 54               Anion Gap 13               AST 46               Bands 1               Baso # 0.00               Basophil % 0.0               BILIRUBIN TOTAL 0.4               BUN 55               BUN/CREAT RATIO 15               Calcium 7.2               Chloride 95               CO2 28               Creatinine 3.72               Differential Type Manual               eGFR 17               Eos # 0.00               Eosinophil % 0.0               Globulin, Total 3.3               Glucose 139               Hematocrit 20.7               Hemoglobin 6.9               Hypo Few               Immature Grans (Abs) 0.10               Immature Granulocytes 0.9               Lymph # 0.18               Lymph % 1.6                2               Magnesium     2.6           MCH 29.7               MCHC 33.3               MCV 89.2               Mono # 0.58               Mono % 5.0                4               MPV 10.1               Neutrophils, Abs 10.65               Neutrophils Relative 92.5               nRBC 2                0.2               NUCLEATED RBC ABSOLUTE 0.02               Phosphorus     8.3           PLATELET MORPHOLOGY Normal               Platelets 231               POC Base Excess       3.6         POC Glucose   158       162       POC HCO3        30.4         POC PCO2       55         POC PH       7.35         POC PO2       164         POC SATURATED O2       99         Potassium 3.7               PROTEIN TOTAL 5.3               RBC 2.32               RDW 13.9               Segmented Neutrophils, Man % 93               Sodium 132               Vancomycin-Trough     27.5           WBC 11.51                                      Significant Imaging:  I have reviewed all pertinent imaging results/findings within the past 24 hours.    Assessment/Plan:     * Cardiac arrest  Possible etiologies include:   - Ischemic heart disease including ACS: Although troponin elevated, the likelihood of ACS is unknown as pt received prolonged chest compressions (20 min). EKG did not show ST deviations. Echo ejection fraction 25%  - Hypoxemia: Pt with COPDe and PNA 2/2 influenza A with superimposed bacterial infection , which cannot be ruled out.   - Valvular heart disease or Hypertrophic CM: Echo pending.   - Arrhythmia: EKG shows A fib with RVR.   - Prolonged QT: EKG with QTc 459.   - Cocaine-induced arrhythmias or MI: UDS cocaine positive     Unlikely etiologies include:  - Intracranial hemorrhage: Unlikely as CT head unremarkable   - PE: CTA showed no PE. Venous doppler normal  - Aortic dissection: No significant widened mediastinal on CXR.   - Acute pericardial tamponade: Not reflected on EKG or CTA.   - Pneumothorax: CXR blackman snot reflect pneumothorax.   Patient has finished warming unresponsive worsening renal and cardiac function    Acute respiratory failure with hypoxia and hypercarbia  Currently being ventilated as a consequence of having acute respiratory failure after cardiac arrest    Hypoxic encephalopathy  Continues to be unresponsive    MILY (acute kidney injury)  Patient on multiple pressors creatinine worsening as well as kidney function    Biventricular heart failure with reduced left ventricular function    Echo    Interpretation Summary  · Atrial fibrillation  observed.  · The left ventricle is normal in size with mild concentric hypertrophy and severely decreased systolic function.  · The estimated ejection fraction is 25%.  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  · Severe right atrial enlargement.  · Moderate-to-severe mitral regurgitation.  · Mechanically ventilated; cannot use inferior caval vein diameter to estimate central venous pressure.    Continue lasix   Plans for [facility]     Atrial fibrillation with RVR  Patient with atrial fibrillation which is controlled currently with Beta Blocker. Patient is currently in atrial fibrillation.[id number] Score: 1. HASBLED Score: >3 (high bleed risk). Anticoagulation indicated. Anticoagulation done with Eliquis. Type of A fib is unknown. All prior EKGs review, with none of them reflecting A fib.   - Discontinue BB 2/2 UDS positive for cocaine. Start Cardizem  mg.    11/26  - after discussion with Dr Purvis-- d/c cardizem- digoxin 250mcg x 1 with amiodarone infusion protocol   11/27- controlled afib at present       NSTEMI (non-ST elevated myocardial infarction)  Unable to do heart catheterization due to worsening kidney function may consider putting a right heart catheter in today     Pneumonia due to influenza A virus  - PSI: 129 (Risk Class IV, 8.2-9.3% mortality)  - Tested positive for Influenza A on 11/23/22.  - CXR: possible R lung pneumonitis.   - CTA: No PE, possible R heart failure and possible pneumonitis.   - Tamiflu, Vanc and Zosyn.   - Xopenex Q8H and Q4H prn in the setting of A fib with RVR. Atrovent Q8H. Solumedrol 40 mg Q6H.   - Blood and sputum Cx pending  - Mechanical ventilation    11/27- continue abx; follow cultures     Severe sepsis  No cultures positive at this time    Hernia of  abdominal wall  - s/p repair on 11/14 no evidence of DVT or PE    HTN (hypertension)  - currently hypotensive on vasopressors                  Parmjit Whaley MD  Pulmonology  [facility] ICU

## 2022-11-29 NOTE — PROGRESS NOTES
Currently holding Vancomycin therapy due to elevated trough of 27.5.     Scr is elevated compared to baseline.    Will recheck a random level before given further doses.

## 2022-11-29 NOTE — PROGRESS NOTES
"[facility] Consult Follow-Up Note     HPI:   68 yo male with medical history significant for COPD, HTN, A fib who presents to the hospital on 11/25 with CP and SOB. History obtained via chart review due to patient being intubated and sedated. Shortly after arrival, patient experienced cardiac arrest. ROSC obtained after 20 min. Patient has since been found to have shock suspected to be septic in nature as well as MILY. Also noted to have UDS positive for cocaine benzodiazepines on arrival. Nephrology consulted for oliguric MIYL.     Subjective/Interval History:  No acute events overnight  UOP improving some today   Off sedation    Objective     Medications:   aspirin  81 mg Per OG tube Daily    atorvastatin  80 mg Per OG tube QHS    famotidine (PF)  20 mg Intravenous Q12H    furosemide (LASIX) injection  60 mg Intravenous BID    gabapentin  300 mg Oral BID    heparin (porcine)  5,000 Units Subcutaneous Q8H    ipratropium  0.5 mg Nebulization Q8H    levalbuterol  1.25 mg Nebulization Q8H    levetiracetam IV  1,000 mg Intravenous Q12H    methylPREDNISolone sodium succinate injection  40 mg Intravenous Q6H    metOLazone  5 mg Oral BID    mupirocin   Nasal BID    oseltamivir  30 mg Oral BID    piperacillin-tazobactam (ZOSYN) IVPB  4.5 g Intravenous Q12H       Physical Exam:   /78   Pulse (!) 54   Temp 96.6 °F (35.9 °C)   Resp (!) 22   Ht 5' 10" (1.778 m)   Wt 60 kg (132 lb 4.4 oz)   SpO2 96%   BMI 18.98 kg/m²   Constitutional: lying in bed, critically ill  Eyes: EOMI, white sclera  ENMT: moist mucus membranes, nares patent  Cardiovascular: normal rate, S1/S2 noted, no edema  Respiratory: symmetrical chest expansion, CTA-B  Gastrointestinal: +BS, soft, NT/ND  Musculoskeletal: normal, no joint erythema/effusions  Skin: no rash, no purpura, warm extremities  Neurological: intubated    I/Os:   I/O last 3 completed shifts:  In: 3294.6 [I.V.:2615.4; IV Piggyback:679.2]  Out: 1025 [Urine:1025]    Labs, " micro, imaging reviewed.       Assessment and Plan:     Patient Active Problem List   Diagnosis    SOB (shortness of breath) on exertion    Follow up    Chest pain    Stage 2 moderate COPD by GOLD classification    HTN (hypertension)    HLD (hyperlipidemia)    Drug abuse    Alcohol abuse    Tobacco abuse    Right upper quadrant abdominal pain    Elbow swelling, left    Constipation    Black stools    Olecranon bursitis of left elbow    Left lower quadrant abdominal pain    Abdominal pain    Hernia of abdominal wall    Weight loss    Unilateral inguinal hernia without obstruction or gangrene    Abdominal wall seroma    Cardiac arrest    Acute respiratory failure with hypoxia and hypercarbia    Severe sepsis    MILY (acute kidney injury)    Pneumonia due to influenza A virus    COPD exacerbation    NSTEMI (non-ST elevated myocardial infarction)    Atrial fibrillation with RVR    D-dimer, elevated    Biventricular heart failure with reduced left ventricular function    Shock    Cardiomyopathy    Hypoxic encephalopathy     MILY  - Baseline cr 0.-91  - MILY in setting of hypoperfusion, possible ATN from recent cardiac arrest  - Electrolytes stable. Adequate UOP. No urgent need for RRT.  - Will order albumin   - Please avoid nephrotoxic agents/NSAIDs  - Renally dose all medications   - Please obtain daily BMP, Mg, and Phos levels  - Please monitor strict UOP  - Daily weights    Thank you for this consult. [facility] will continue to follow along. Please call with any questions.     Diamond Harry, DO  [facility]   11/29/2022

## 2022-11-29 NOTE — PLAN OF CARE
Problem: Adult Inpatient Plan of Care  Goal: Plan of Care Review  Flowsheets (Taken 11/29/2022 0132)  Plan of Care Reviewed With: daughter  Goal: Absence of Hospital-Acquired Illness or Injury  Intervention: Identify and Manage Fall Risk  Flowsheets (Taken 11/29/2022 0132)  Safety Promotion/Fall Prevention:   bed alarm set   Fall Risk reviewed with patient/family   lighting adjusted   medications reviewed   side rails raised x 3   room near unit station  Intervention: Prevent Skin Injury  Flowsheets (Taken 11/29/2022 0132)  Body Position: turned  Skin Protection:   adhesive use limited   incontinence pads utilized   tubing/devices free from skin contact   transparent dressing maintained   skin-to-skin areas padded   skin-to-device areas padded   protective footwear used  Intervention: Prevent and Manage VTE (Venous Thromboembolism) Risk  Flowsheets (Taken 11/29/2022 0132)  Activity Management: Patient unable to perform activities  VTE Prevention/Management:   remove, assess skin, and reapply sequential compression device   dorsiflexion/plantar flexion performed   intravenous hydration   ROM (passive) performed  Range of Motion: ROM (range of motion) performed  Intervention: Prevent Infection  Flowsheets (Taken 11/29/2022 0132)  Infection Prevention:   equipment surfaces disinfected   hand hygiene promoted   personal protective equipment utilized   rest/sleep promoted   single patient room provided  Goal: Optimal Comfort and Wellbeing  Intervention: Provide Person-Centered Care  Flowsheets (Taken 11/29/2022 0132)  Trust Relationship/Rapport:   care explained   reassurance provided     Problem: Communication Impairment (Mechanical Ventilation, Invasive)  Goal: Effective Communication  Intervention: Ensure Effective Communication  Flowsheets (Taken 11/29/2022 0132)  Communication Enhancement Strategies:   extra time allowed for response   one-step directions provided   repetition utilized   nonverbal strategies  used     Problem: Device-Related Complication Risk (Mechanical Ventilation, Invasive)  Goal: Optimal Device Function  Intervention: Optimize Device Care and Function  Flowsheets (Taken 11/29/2022 0132)  Aspiration Precautions:   upright posture maintained   respiratory status monitored  Airway Safety Measures:   mask valve resuscitator at bedside   oxygen flowmeter at bedside   suction at bedside     Problem: Inability to Wean (Mechanical Ventilation, Invasive)  Goal: Mechanical Ventilation Liberation  Intervention: Promote Extubation and Mechanical Ventilation Liberation  Flowsheets (Taken 11/29/2022 0132)  Sleep/Rest Enhancement:   consistent schedule promoted   noise level reduced   regular sleep/rest pattern promoted   relaxation techniques promoted   room darkened  Environmental Support:   calm environment promoted   caregiver consistency promoted   distractions minimized   environmental consistency promoted   personal routine supported  Medication Review/Management: medications reviewed     Problem: Nutrition Impairment (Mechanical Ventilation, Invasive)  Goal: Optimal Nutrition Delivery  Intervention: Optimize Nutrition Delivery  Flowsheets (Taken 11/29/2022 0132)  Nutrition Support Management: weight trending reviewed     Problem: Skin and Tissue Injury (Mechanical Ventilation, Invasive)  Goal: Absence of Device-Related Skin and Tissue Injury  Intervention: Maintain Skin and Tissue Health  Flowsheets (Taken 11/29/2022 0132)  Device Skin Pressure Protection:   skin-to-skin areas padded   skin-to-device areas padded   pressure points protected   positioning supports utilized   adhesive use limited   absorbent pad utilized/changed     Problem: Ventilator-Induced Lung Injury (Mechanical Ventilation, Invasive)  Goal: Absence of Ventilator-Induced Lung Injury  Intervention: Facilitate Lung-Protection Measures  Flowsheets (Taken 11/29/2022 0132)  Lung Protection Measures: ventilator waveforms  monitored  Intervention: Prevent Ventilator-Associated Pneumonia  Flowsheets (Taken 11/29/2022 0132)  Head of Bed (HOB) Positioning: HOB at 20-30 degrees  Oral Care:   lip/mouth moisturizer applied   suction provided   swabbed with antiseptic solution   tongue brushed     Problem: Communication Impairment (Artificial Airway)  Goal: Effective Communication  Intervention: Ensure Effective Communication  Flowsheets (Taken 11/29/2022 0132)  Communication Enhancement Strategies:   extra time allowed for response   one-step directions provided   repetition utilized   nonverbal strategies used  Trust Relationship/Rapport:   care explained   reassurance provided     Problem: Device-Related Complication Risk (Artificial Airway)  Goal: Optimal Device Function  Intervention: Optimize Device Care and Function  Flowsheets (Taken 11/29/2022 0132)  Airway/Ventilation Management:   pulmonary hygiene promoted   airway patency maintained  Aspiration Precautions:   upright posture maintained   respiratory status monitored  Airway Safety Measures:   mask valve resuscitator at bedside   oxygen flowmeter at bedside   suction at bedside  Oral Care:   lip/mouth moisturizer applied   suction provided   swabbed with antiseptic solution   tongue brushed     Problem: Skin and Tissue Injury (Artificial Airway)  Goal: Absence of Device-Related Skin or Tissue Injury  Intervention: Maintain Skin and Tissue Health  Flowsheets (Taken 11/29/2022 0132)  Device Skin Pressure Protection:   skin-to-skin areas padded   skin-to-device areas padded   pressure points protected   positioning supports utilized   adhesive use limited   absorbent pad utilized/changed     Problem: Noninvasive Ventilation Acute  Goal: Effective Unassisted Ventilation and Oxygenation  Intervention: Monitor and Manage Noninvasive Ventilation  Flowsheets (Taken 11/29/2022 0132)  Airway/Ventilation Management:   pulmonary hygiene promoted   airway patency maintained  NPPV/CPAP  Maintenance: tubes secured     Problem: Glycemic Control Impaired (Sepsis/Septic Shock)  Goal: Blood Glucose Level Within Desired Range  Intervention: Optimize Glycemic Control  Flowsheets (Taken 11/29/2022 0132)  Glycemic Management: blood glucose monitored     Problem: Infection Progression (Sepsis/Septic Shock)  Goal: Absence of Infection Signs and Symptoms  Intervention: Initiate Sepsis Management  Flowsheets (Taken 11/29/2022 0132)  Infection Prevention:   equipment surfaces disinfected   hand hygiene promoted   personal protective equipment utilized   rest/sleep promoted   single patient room provided  Isolation Precautions:   precautions maintained   contact   droplet  Intervention: Promote Stabilization  Flowsheets (Taken 11/29/2022 0132)  Lung Protection Measures: ventilator waveforms monitored  Intervention: Promote Recovery  Flowsheets (Taken 11/29/2022 0132)  Sleep/Rest Enhancement:   consistent schedule promoted   noise level reduced   regular sleep/rest pattern promoted   relaxation techniques promoted   room darkened  Activity Management: Patient unable to perform activities     Problem: Nutrition Impaired (Sepsis/Septic Shock)  Goal: Optimal Nutrition Intake  Intervention: Promote and Optimize Nutrition Delivery  Flowsheets (Taken 11/29/2022 0132)  Nutrition Support Management: weight trending reviewed     Problem: Renal Function Impairment (Acute Kidney Injury/Impairment)  Goal: Effective Renal Function  Intervention: Monitor and Support Renal Function  Flowsheets (Taken 11/29/2022 0132)  Medication Review/Management: medications reviewed     Problem: Infection  Goal: Absence of Infection Signs and Symptoms  Intervention: Prevent or Manage Infection  Flowsheets (Taken 11/29/2022 0132)  Isolation Precautions:   precautions maintained   contact   droplet     Problem: Skin Injury Risk Increased  Goal: Skin Health and Integrity  Intervention: Optimize Skin Protection  Flowsheets (Taken 11/29/2022  0132)  Pressure Reduction Techniques:   weight shift assistance provided   heels elevated off bed   pressure points protected  Pressure Reduction Devices:   specialty bed utilized   pressure-redistributing mattress utilized   positioning supports utilized   heel offloading device utilized   foam padding utilized  Skin Protection:   adhesive use limited   incontinence pads utilized   tubing/devices free from skin contact   transparent dressing maintained   skin-to-skin areas padded   skin-to-device areas padded   protective footwear used  Head of Bed (HOB) Positioning: HOB at 20-30 degrees

## 2022-11-29 NOTE — SUBJECTIVE & OBJECTIVE
Interval History:  Patient unresponsive    Objective:     Vital Signs (Most Recent):  Temp: 96.6 °F (35.9 °C) (11/29/22 0715)  Pulse: 98 (11/29/22 0733)  Resp: 18 (11/29/22 0733)  BP: (!) 141/75 (11/29/22 0715)  SpO2: 95 % (11/29/22 0733)   Vital Signs (24h Range):  Temp:  [94.3 °F (34.6 °C)-98.4 °F (36.9 °C)] 96.6 °F (35.9 °C)  Pulse:  [] 98  Resp:  [12-36] 18  SpO2:  [63 %-100 %] 95 %  BP: ()/() 141/75     Weight: 60 kg (132 lb 4.4 oz)  Body mass index is 18.98 kg/m².      Intake/Output Summary (Last 24 hours) at 11/29/2022 0738  Last data filed at 11/29/2022 0600  Gross per 24 hour   Intake 2362.05 ml   Output 875 ml   Net 1487.05 ml       Physical Exam  Vitals reviewed.   Constitutional:       Appearance: Normal appearance.      Interventions: He is not intubated.  HENT:      Head: Normocephalic and atraumatic.      Nose: Nose normal.      Mouth/Throat:      Mouth: Mucous membranes are dry.      Pharynx: Oropharynx is clear.   Eyes:      Extraocular Movements: Extraocular movements intact.      Conjunctiva/sclera: Conjunctivae normal.      Pupils: Pupils are equal, round, and reactive to light.   Cardiovascular:      Rate and Rhythm: Normal rate.      Heart sounds: Normal heart sounds. No murmur heard.  Pulmonary:      Effort: Pulmonary effort is normal. He is not intubated.      Breath sounds: Normal breath sounds.   Abdominal:      General: Abdomen is flat. Bowel sounds are normal.      Palpations: Abdomen is soft.   Musculoskeletal:         General: Normal range of motion.      Cervical back: Normal range of motion and neck supple.      Right lower leg: No edema.      Left lower leg: No edema.   Skin:     General: Skin is warm and dry.      Capillary Refill: Capillary refill takes less than 2 seconds.   Neurological:      General: No focal deficit present.      Mental Status: He is alert and oriented to person, place, and time.   Psychiatric:         Mood and Affect: Mood normal.          Behavior: Behavior normal.       Vents:  Vent Mode: A/C (11/29/22 0349)  Ventilator Initiated: Yes (11/25/22 1730)  Set Rate: 18 BPM (11/29/22 0349)  Vt Set: 550 mL (11/29/22 0349)  PEEP/CPAP: 6 cmH20 (11/29/22 0349)  Oxygen Concentration (%): 40 (11/29/22 0349)  Peak Airway Pressure: 20 cmH20 (11/29/22 0349)  Total Ve: 9.4 L/m (11/29/22 0349)  F/VT Ratio<105 (RSBI): (!) 34.62 (11/28/22 1512)    Lines/Drains/Airways       Drain  Duration                  NG/OG Tube 11/25/22 2330 Center mouth 3 days         Urethral Catheter 11/25/22 1842 Temperature probe 16 Fr. 3 days         Sheath 11/28/22 1900 Left upper;lateral <1 day              Airway  Duration                  Airway - Non-Surgical 11/25/22 4 days              Arterial Line  Duration             Arterial Line 11/26/22 1600 Left Radial 2 days              Peripheral Intravenous Line  Duration                  External Jugular IV 11/25/22 1834 3 days         Peripheral IV - Single Lumen 11/27/22 2230 16 G Anterior;Right Forearm 1 day         Peripheral IV - Single Lumen 11/27/22 2235 18 G Anterior;Proximal;Right Forearm 1 day                    Significant Labs:    CBC/Anemia Profile:  Recent Labs   Lab 11/28/22  0526 11/29/22 0459   WBC 15.84* 11.51*   HGB 7.8* 6.9*   HCT 23.5* 20.7*    231   MCV 89.0 89.2   RDW 14.1 13.9        Chemistries:  Recent Labs   Lab 11/27/22  1225 11/27/22  2342 11/28/22  0752 11/28/22  1130 11/28/22  2342 11/29/22  0459     --  136  --   --  132*   K 3.9  --  3.8  --   --  3.7     --  99  --   --  95*   CO2 28  --  28  --   --  28   BUN 37*  --  44*  --   --  55*   CREATININE 1.90*  --  2.51*  --   --  3.72*   CALCIUM 7.6*  --  7.7*  --   --  7.2*   ALBUMIN 1.9*  --  1.9*  --   --  2.0*   PROT 5.1*  --  5.4*  --   --  5.3*   BILITOT 0.5  --  0.5  --   --  0.4   ALKPHOS 89  --  73  --   --  60   ALT 76*  --  69*  --   --  54   AST 62*  --  47*  --   --  46*   MG 2.6*   < > 2.6* 2.4* 2.6*  --    PHOS 5.3*   < >  6.7* 7.1* 8.3*  --     < > = values in this interval not displayed.       Recent Lab Results  (Last 5 results in the past 24 hours)        11/29/22  0459   11/28/22  2357   11/28/22  2342   11/28/22  1644   11/28/22  1229        Albumin/Globulin Ratio 0.6               Albumin 2.0               Alkaline Phosphatase 60               ALT 54               Anion Gap 13               AST 46               Bands 1               Baso # 0.00               Basophil % 0.0               BILIRUBIN TOTAL 0.4               BUN 55               BUN/CREAT RATIO 15               Calcium 7.2               Chloride 95               CO2 28               Creatinine 3.72               Differential Type Manual               eGFR 17               Eos # 0.00               Eosinophil % 0.0               Globulin, Total 3.3               Glucose 139               Hematocrit 20.7               Hemoglobin 6.9               Hypo Few               Immature Grans (Abs) 0.10               Immature Granulocytes 0.9               Lymph # 0.18               Lymph % 1.6                2               Magnesium     2.6           MCH 29.7               MCHC 33.3               MCV 89.2               Mono # 0.58               Mono % 5.0                4               MPV 10.1               Neutrophils, Abs 10.65               Neutrophils Relative 92.5               nRBC 2                0.2               NUCLEATED RBC ABSOLUTE 0.02               Phosphorus     8.3           PLATELET MORPHOLOGY Normal               Platelets 231               POC Base Excess       3.6         POC Glucose   158       162       POC HCO3       30.4         POC PCO2       55         POC PH       7.35         POC PO2       164         POC SATURATED O2       99         Potassium 3.7               PROTEIN TOTAL 5.3               RBC 2.32               RDW 13.9               Segmented Neutrophils, Man % 93               Sodium 132               Vancomycin-Trough     27.5            WBC 11.51                                      Significant Imaging:  I have reviewed all pertinent imaging results/findings within the past 24 hours.   Parent

## 2022-11-29 NOTE — PROGRESS NOTES
Vancomycin random resulted this afternoon as 28.1. Will continue to hold vancomycin in light of worsening kidney function. Will re-dose when vancomycin random level results <20. Random level ordered for 12/1 AM. Pharmacy will continue to monitor and make adjustments.    Jo Johnson, PharmD  Ext. [phone]

## 2022-11-29 NOTE — ASSESSMENT & PLAN NOTE
Patient with atrial fibrillation which is controlled currently with Beta Blocker. Patient is currently in atrial fibrillation.[id number] Score: 1. HASBLED Score: >3 (high bleed risk). Anticoagulation indicated. Anticoagulation done with Eliquis. Type of A fib is unknown. All prior EKGs review, with none of them reflecting A fib.   - Discontinue BB 2/2 UDS positive for cocaine. Start Cardizem  mg.    11/26  - after discussion with Dr Purvis-- d/c cardizem- digoxin 250mcg x 1 with amiodarone infusion protocol   11/27- controlled afib at present

## 2022-11-29 NOTE — ASSESSMENT & PLAN NOTE
Possible etiologies include:   - Ischemic heart disease including ACS: Although troponin elevated, the likelihood of ACS is unknown as pt received prolonged chest compressions (20 min). EKG did not show ST deviations. Echo ejection fraction 25%  - Hypoxemia: Pt with COPDe and PNA 2/2 influenza A with superimposed bacterial infection , which cannot be ruled out.   - Valvular heart disease or Hypertrophic CM: Echo pending.   - Arrhythmia: EKG shows A fib with RVR.   - Prolonged QT: EKG with QTc 459.   - Cocaine-induced arrhythmias or MI: UDS cocaine positive     Unlikely etiologies include:  - Intracranial hemorrhage: Unlikely as CT head unremarkable   - PE: CTA showed no PE. Venous doppler normal  - Aortic dissection: No significant widened mediastinal on CXR.   - Acute pericardial tamponade: Not reflected on EKG or CTA.   - Pneumothorax: CXR blackman snot reflect pneumothorax.   Patient has finished warming unresponsive worsening renal and cardiac function

## 2022-11-29 NOTE — PLAN OF CARE
Problem: Adult Inpatient Plan of Care  Goal: Plan of Care Review  Outcome: Ongoing, Progressing  Goal: Patient-Specific Goal (Individualized)  Outcome: Ongoing, Progressing  Goal: Absence of Hospital-Acquired Illness or Injury  Outcome: Ongoing, Progressing  Goal: Optimal Comfort and Wellbeing  Outcome: Ongoing, Progressing     Problem: Adult Inpatient Plan of Care  Goal: Readiness for Transition of Care  Outcome: Ongoing, Not Progressing

## 2022-11-30 PROBLEM — D64.89 OTHER SPECIFIED ANEMIAS: Status: ACTIVE | Noted: 2022-01-01

## 2022-11-30 NOTE — SUBJECTIVE & OBJECTIVE
Interval History:  Unresponsive    Objective:     Vital Signs (Most Recent):  Temp: 97 °F (36.1 °C) (11/30/22 0700)  Pulse: 81 (11/30/22 0700)  Resp: 18 (11/30/22 0700)  BP: (!) 150/78 (11/30/22 0600)  SpO2: (!) 94 % (11/30/22 0700)   Vital Signs (24h Range):  Temp:  [96.4 °F (35.8 °C)-99 °F (37.2 °C)] 97 °F (36.1 °C)  Pulse:  [] 81  Resp:  [17-22] 18  SpO2:  [77 %-100 %] 94 %  BP: (103-169)/(59-91) 150/78     Weight: 55.4 kg (122 lb 2.2 oz)  Body mass index is 17.52 kg/m².      Intake/Output Summary (Last 24 hours) at 11/30/2022 0731  Last data filed at 11/30/2022 0600  Gross per 24 hour   Intake 2059.99 ml   Output 5700 ml   Net -3640.01 ml       Physical Exam  Vitals reviewed.   Constitutional:       Appearance: Normal appearance.      Interventions: He is intubated.   HENT:      Head: Normocephalic and atraumatic.      Nose: Nose normal.      Mouth/Throat:      Mouth: Mucous membranes are dry.      Pharynx: Oropharynx is clear.   Eyes:      Extraocular Movements: Extraocular movements intact.      Conjunctiva/sclera: Conjunctivae normal.      Pupils: Pupils are equal, round, and reactive to light.   Cardiovascular:      Rate and Rhythm: Normal rate.      Heart sounds: Normal heart sounds. No murmur heard.  Pulmonary:      Effort: Pulmonary effort is normal. He is intubated.      Breath sounds: Normal breath sounds.   Abdominal:      General: Abdomen is flat. Bowel sounds are normal.      Palpations: Abdomen is soft.   Musculoskeletal:         General: Normal range of motion.      Cervical back: Normal range of motion and neck supple.      Right lower leg: No edema.      Left lower leg: No edema.   Skin:     General: Skin is warm and dry.      Capillary Refill: Capillary refill takes less than 2 seconds.   Neurological:      General: No focal deficit present.       Vents:  Vent Mode: A/C (11/30/22 8555)  Ventilator Initiated: Yes (11/25/22 7450)  Set Rate: 18 BPM (11/30/22 0415)  Vt Set: 550 mL (11/30/22  0415)  PEEP/CPAP: 5 cmH20 (11/30/22 0415)  Oxygen Concentration (%): 40 (11/30/22 0415)  Peak Airway Pressure: 21 cmH20 (11/30/22 0415)  Total Ve: 9.3 L/m (11/30/22 0415)  F/VT Ratio<105 (RSBI): (!) 34.62 (11/30/22 0415)    Lines/Drains/Airways       Drain  Duration                  NG/OG Tube 11/25/22 2330 Center mouth 4 days         Urethral Catheter 11/25/22 1842 Temperature probe 16 Fr. 4 days         Sheath 11/28/22 1900 Left upper;lateral 1 day              Airway  Duration                  Airway - Non-Surgical 11/25/22 5 days              Arterial Line  Duration             Arterial Line 11/26/22 1600 Left Radial 3 days              Peripheral Intravenous Line  Duration                  External Jugular IV 11/25/22 1834 4 days         Peripheral IV - Single Lumen 11/27/22 2230 16 G Anterior;Right Forearm 2 days         Peripheral IV - Single Lumen 11/27/22 2235 18 G Anterior;Proximal;Right Forearm 2 days                    Significant Labs:    CBC/Anemia Profile:  Recent Labs   Lab 11/29/22  0459   WBC 11.51*   HGB 6.9*   HCT 20.7*      MCV 89.2   RDW 13.9        Chemistries:  Recent Labs   Lab 11/28/22  0752 11/28/22  1130 11/29/22  0459 11/29/22  0742 11/29/22  1539 11/30/22  0015 11/30/22  0623     --  132*  --   --   --  138   K 3.8  --  3.7  --   --   --  3.0*   CL 99  --  95*  --   --   --  94*   CO2 28  --  28  --   --   --  34*   BUN 44*  --  55*  --   --   --  61*   CREATININE 2.51*  --  3.72*  --   --   --  3.57*   CALCIUM 7.7*  --  7.2*  --   --   --  8.1*   ALBUMIN 1.9*  --  2.0*  --   --   --  2.8*   PROT 5.4*  --  5.3*  --   --   --  6.2*   BILITOT 0.5  --  0.4  --   --   --  0.7   ALKPHOS 73  --  60  --   --   --  61   ALT 69*  --  54  --   --   --  48   AST 47*  --  46*  --   --   --  49*   MG 2.6*   < >  --  2.5* 2.3 2.4*  --    PHOS 6.7*   < >  --  7.1* 6.4* 5.9*  --     < > = values in this interval not displayed.       Recent Lab Results  (Last 5 results in the past 24 hours)         11/30/22  0623   11/30/22  0015   11/29/22  1539   11/29/22  1203   11/29/22  1140        Albumin/Globulin Ratio 0.8               Albumin 2.8               Alkaline Phosphatase 61               ALT 48               Anion Gap 13               AST 49               BILIRUBIN TOTAL 0.7               BUN 61               BUN/CREAT RATIO 17               Calcium 8.1               Chloride 94               CO2 34               Creatinine 3.57               eGFR 18               Globulin, Total 3.4               Glucose 110               Magnesium   2.4   2.3           Phosphorus   5.9   6.4           POC Glucose       145         Potassium 3.0               PROTEIN TOTAL 6.2               Sodium 138               Vancomycin, Random         28.1                              Significant Imaging:  I have reviewed all pertinent imaging results/findings within the past 24 hours.

## 2022-11-30 NOTE — PROGRESS NOTES
[facility]  Cardiology  Progress Note    Patient Name: Joao Chi Sr  MRN: [id number]  Admission Date: 11/25/2022  Hospital Length of Stay: 4 days  Code Status: Full Code   Attending Physician: Parmjit Whaley MD   Primary Care Physician: Cornelia Weiss MD  Expected Discharge Date:   Principal Problem:Cardiac arrest    Subjective:   Interval History: Intubated unable to perform ROS    Objective:  Physical Exam  Vitals reviewed.   Constitutional:       Appearance: He is ill-appearing.      Interventions: He is intubated.      Comments: UNRESPONSIVE   HENT:      Head: Normocephalic.      Mouth/Throat:      Mouth: Mucous membranes are dry.   Eyes:      Pupils:      Right eye: Pupil is sluggish.      Left eye: Pupil is sluggish.   Cardiovascular:      Rate and Rhythm: Normal rate. Rhythm irregular.      Pulses: Normal pulses.   Pulmonary:      Effort: Pulmonary effort is normal. He is intubated.      Breath sounds: Normal breath sounds.   Abdominal:      General: There is no distension.      Palpations: Abdomen is soft.   Musculoskeletal:      Right lower leg: No edema.      Left lower leg: No edema.   Skin:     Capillary Refill: Capillary refill takes less than 2 seconds.   Neurological:      Mental Status: He is unresponsive.      GCS: GCS eye subscore is 1. GCS verbal subscore is 1. GCS motor subscore is 1.     Assessment and Plan:     Brief HPI: 68 yo M presents to [facility] ED via EMS after cardiac arrest. History is per chart review. Pt was at a motel with a friend, who gave the hx. Pt had gotten up to take his medications then laid down and became unresponsive. EMS intubated, gave 0.4 Narcan, 3 epi and 1 bicarb and started chest compressions. ROSC was achieved as soon as EMS pulled up to the ED    * Cardiac arrest  - Concern for cardiogenic shock  - Echo suggestive of biventricular failure  - Etiology likely severe RV failure  - Primary team managing.   - C when stable and  targeted temperature therapy completed. Likely Monday.     11/29/22  - Seen by   - Pt remains critical requiring mechanical ventilation and multiple vasopressor support  - Uncertain etiology of cardiac arrest. According to notes this was a PEA arrest no shock able rhythm. ROSC after approximately 20-30 minutes  - Urine drug screen + for cocaine  - Consider LHC/RHC. Will attempt to discuss with family tomorrow.        Shock  - Undifferentiated shock likely cardiogenic as EF is 25% and presence of severe RV failure.   - Continue antibiotics x 48 hours. Discontinue antibiotic once blood Cx negative.     Biventricular heart failure with reduced left ventricular function  Patient is identified as having Systolic (HFrEF) heart failure that is Acute. CHF is currently uncontrolled due to Rales/crackles on pulmonary exam. Latest ECHO performed and demonstrates- Results for orders placed during the hospital encounter of 11/25/22    - Echo:Atrial fibrillation. LVEF 25%. Segmental LV wall abnormalities. [place], anterior and anteroseptal wall is akinetic (LAD infarct vs Takasubo). Moderate RVE with severely reduced RV systolic function. Severe LAURITA. Moderate to severe MR.   - Continue Furosemide and monitor clinical status closely. Monitor on telemetry. Patient is on CHF pathway.  Monitor strict Is&Os and daily weights.  Place on fluid restriction of 1.5 L. Continue to stress to patient importance of self efficacy and  on diet for CHF. Last BNP reviewed- 5,100.     - Dopamine and Levophed  - Lasix 60 mg Q12H  - No ACEi/ARB or BB 2/2 low BP requiring pressors.       Atrial fibrillation with RVR  - Patient with atrial fibrillation which is controlled currently   - [id number] Score: 1. HASBLED Score: >3 (high bleed risk)  - Hold Eliquis for likely LHC on Monday. Heparin gtt.           NSTEMI (non-ST elevated myocardial infarction)  - Echo: Abnormal wall abnormities. Takasubo vs LAD MI infarct  - ACS protocol  -  LIONEL: 49 (intermediate/ high risk). Heart: 8 (high). Ana: 156 (25% probability of death from admission to 6 months).  - EKG: A fib with . T wave inversions in precordial leads.   - Troponin 334-->295-->375-->326  - Continue ASA, Atorvastatin 80 mg.   - No ACEi/ARB or BB 2/2 low BP requiring pressors. UDS also positive for cocaine.   - Heparin gtt.         VTE Risk Mitigation (From admission, onward)           Ordered     heparin (porcine) injection 5,000 Units  Every 8 hours         11/28/22 1818     IP VTE LOW RISK PATIENT  Once         11/25/22 2103     Place NATALIIA hose  Until discontinued         11/25/22 2103                    Jeffy Gutiérrez, NELSONP  Cardiology  [facility]

## 2022-11-30 NOTE — PROGRESS NOTES
[facility]  Pulmonology  Progress Note    Patient Name: Joao Chi Sr  MRN: [id number]  Admission Date: 11/25/2022  Hospital Length of Stay: 5 days  Code Status: Full Code  Attending Provider: Parmjit Whaley MD  Primary Care Provider: Cornelia Weiss MD   Principal Problem: Cardiac arrest    Subjective:     Interval History:  Unresponsive    Objective:     Vital Signs (Most Recent):  Temp: 97 °F (36.1 °C) (11/30/22 0700)  Pulse: 81 (11/30/22 0700)  Resp: 18 (11/30/22 0700)  BP: (!) 150/78 (11/30/22 0600)  SpO2: (!) 94 % (11/30/22 0700)   Vital Signs (24h Range):  Temp:  [96.4 °F (35.8 °C)-99 °F (37.2 °C)] 97 °F (36.1 °C)  Pulse:  [] 81  Resp:  [17-22] 18  SpO2:  [77 %-100 %] 94 %  BP: (103-169)/(59-91) 150/78     Weight: 55.4 kg (122 lb 2.2 oz)  Body mass index is 17.52 kg/m².      Intake/Output Summary (Last 24 hours) at 11/30/2022 0731  Last data filed at 11/30/2022 0600  Gross per 24 hour   Intake 2059.99 ml   Output 5700 ml   Net -3640.01 ml       Physical Exam  Vitals reviewed.   Constitutional:       Appearance: Normal appearance.      Interventions: He is intubated.   HENT:      Head: Normocephalic and atraumatic.      Nose: Nose normal.      Mouth/Throat:      Mouth: Mucous membranes are dry.      Pharynx: Oropharynx is clear.   Eyes:      Extraocular Movements: Extraocular movements intact.      Conjunctiva/sclera: Conjunctivae normal.      Pupils: Pupils are equal, round, and reactive to light.   Cardiovascular:      Rate and Rhythm: Normal rate.      Heart sounds: Normal heart sounds. No murmur heard.  Pulmonary:      Effort: Pulmonary effort is normal. He is intubated.      Breath sounds: Normal breath sounds.   Abdominal:      General: Abdomen is flat. Bowel sounds are normal.      Palpations: Abdomen is soft.   Musculoskeletal:         General: Normal range of motion.      Cervical back: Normal range of motion and neck supple.      Right lower leg: No edema.      Left  lower leg: No edema.   Skin:     General: Skin is warm and dry.      Capillary Refill: Capillary refill takes less than 2 seconds.   Neurological:      General: No focal deficit present.       Vents:  Vent Mode: A/C (11/30/22 0415)  Ventilator Initiated: Yes (11/25/22 1730)  Set Rate: 18 BPM (11/30/22 0415)  Vt Set: 550 mL (11/30/22 0415)  PEEP/CPAP: 5 cmH20 (11/30/22 0415)  Oxygen Concentration (%): 40 (11/30/22 0415)  Peak Airway Pressure: 21 cmH20 (11/30/22 0415)  Total Ve: 9.3 L/m (11/30/22 0415)  F/VT Ratio<105 (RSBI): (!) 34.62 (11/30/22 0415)    Lines/Drains/Airways       Drain  Duration                  NG/OG Tube 11/25/22 2330 Center mouth 4 days         Urethral Catheter 11/25/22 1842 Temperature probe 16 Fr. 4 days         Sheath 11/28/22 1900 Left upper;lateral 1 day              Airway  Duration                  Airway - Non-Surgical 11/25/22 5 days              Arterial Line  Duration             Arterial Line 11/26/22 1600 Left Radial 3 days              Peripheral Intravenous Line  Duration                  External Jugular IV 11/25/22 1834 4 days         Peripheral IV - Single Lumen 11/27/22 2230 16 G Anterior;Right Forearm 2 days         Peripheral IV - Single Lumen 11/27/22 2235 18 G Anterior;Proximal;Right Forearm 2 days                    Significant Labs:    CBC/Anemia Profile:  Recent Labs   Lab 11/29/22  0459   WBC 11.51*   HGB 6.9*   HCT 20.7*      MCV 89.2   RDW 13.9        Chemistries:  Recent Labs   Lab 11/28/22  0752 11/28/22  1130 11/29/22  0459 11/29/22  0742 11/29/22  1539 11/30/22  0015 11/30/22  0623     --  132*  --   --   --  138   K 3.8  --  3.7  --   --   --  3.0*   CL 99  --  95*  --   --   --  94*   CO2 28  --  28  --   --   --  34*   BUN 44*  --  55*  --   --   --  61*   CREATININE 2.51*  --  3.72*  --   --   --  3.57*   CALCIUM 7.7*  --  7.2*  --   --   --  8.1*   ALBUMIN 1.9*  --  2.0*  --   --   --  2.8*   PROT 5.4*  --  5.3*  --   --   --  6.2*   BILITOT 0.5   --  0.4  --   --   --  0.7   ALKPHOS 73  --  60  --   --   --  61   ALT 69*  --  54  --   --   --  48   AST 47*  --  46*  --   --   --  49*   MG 2.6*   < >  --  2.5* 2.3 2.4*  --    PHOS 6.7*   < >  --  7.1* 6.4* 5.9*  --     < > = values in this interval not displayed.       Recent Lab Results  (Last 5 results in the past 24 hours)        11/30/22  0623   11/30/22  0015   11/29/22  1539   11/29/22  1203   11/29/22  1140        Albumin/Globulin Ratio 0.8               Albumin 2.8               Alkaline Phosphatase 61               ALT 48               Anion Gap 13               AST 49               BILIRUBIN TOTAL 0.7               BUN 61               BUN/CREAT RATIO 17               Calcium 8.1               Chloride 94               CO2 34               Creatinine 3.57               eGFR 18               Globulin, Total 3.4               Glucose 110               Magnesium   2.4   2.3           Phosphorus   5.9   6.4           POC Glucose       145         Potassium 3.0               PROTEIN TOTAL 6.2               Sodium 138               Vancomycin, Random         28.1                              Significant Imaging:  I have reviewed all pertinent imaging results/findings within the past 24 hours.    Assessment/Plan:     * Cardiac arrest  Possible etiologies include:   - Ischemic heart disease including ACS: Although troponin elevated, the likelihood of ACS is unknown as pt received prolonged chest compressions (20 min). EKG did not show ST deviations. Echo ejection fraction 25%  - Hypoxemia: Pt with COPDe and PNA 2/2 influenza A with superimposed bacterial infection , which cannot be ruled out.   - Valvular heart disease or Hypertrophic CM: Echo pending.   - Arrhythmia: EKG shows A fib with RVR.   - Prolonged QT: EKG with QTc 459.   - Cocaine-induced arrhythmias or MI: UDS cocaine positive     Unlikely etiologies include:  - Intracranial hemorrhage: Unlikely as CT head unremarkable   - PE: CTA showed no PE.  Venous doppler normal  - Aortic dissection: No significant widened mediastinal on CXR.   - Acute pericardial tamponade: Not reflected on EKG or CTA.   - Pneumothorax: CXR blackman snot reflect pneumothorax.   Patient remains unresponsive    Acute respiratory failure with hypoxia and hypercarbia  Currently being ventilated as a consequence of having acute respiratory failure after cardiac arrest    Hypoxic encephalopathy  Having some myoclonic jerks I believe were going to repeat EEG    MILY (acute kidney injury)  Creatinine little bit better urine output improved    Other specified anemias  Transfuse 1 unit of packed red blood cells    Atrial fibrillation with RVR  Patient with atrial fibrillation which is controlled currently with Beta Blocker. Patient is currently in atrial fibrillation.[id number] Score: 1. HASBLED Score: >3 (high bleed risk). Anticoagulation indicated. Anticoagulation done with Eliquis. Type of A fib is unknown. All prior EKGs review, with none of them reflecting A fib.   - Discontinue BB 2/2 UDS positive for cocaine. Start Cardizem  mg.    11/26  - after discussion with Dr Purvis-- d/c cardizem- digoxin 250mcg x 1 with amiodarone infusion protocol   11/27- controlled afib at present       Hernia of abdominal wall  - s/p repair on 11/14 no evidence of DVT or PE                 Parmjit Whaley MD  Pulmonology  [facility]

## 2022-11-30 NOTE — PROGRESS NOTES
[facility]  Cardiology  Progress Note    Patient Name: Joao Chi Sr  MRN: [id number]  Admission Date: 11/25/2022  Hospital Length of Stay: 5 days  Code Status: Full Code   Attending Physician: Parmjit Whaley MD   Primary Care Physician: Cornelia Weiss MD  Expected Discharge Date:   Principal Problem:Cardiac arrest    Subjective:     Hospital Course:   No notes on file    Interval History: Intubated, unresponsive with no sedation infusing    Review of Systems   Unable to perform ROS: Intubated   Objective:     Vital Signs (Most Recent):  Temp: 96.8 °F (36 °C) (11/30/22 1500)  Pulse: 90 (11/30/22 1510)  Resp: (!) 21 (11/30/22 1510)  BP: (!) 191/123 (11/30/22 1500)  SpO2: 96 % (11/30/22 1500)   Vital Signs (24h Range):  Temp:  [96.3 °F (35.7 °C)-99 °F (37.2 °C)] 96.8 °F (36 °C)  Pulse:  [] 90  Resp:  [17-21] 21  SpO2:  [77 %-100 %] 96 %  BP: (127-191)/() 191/123     Weight: 55.4 kg (122 lb 2.2 oz)  Body mass index is 17.52 kg/m².     SpO2: 96 %  O2 Device (Oxygen Therapy): ventilator      Intake/Output Summary (Last 24 hours) at 11/30/2022 1519  Last data filed at 11/30/2022 1501  Gross per 24 hour   Intake 1877.1 ml   Output 5500 ml   Net -3622.9 ml       Lines/Drains/Airways       Drain  Duration                  NG/OG Tube 11/25/22 2330 Center mouth 4 days         Urethral Catheter 11/25/22 1842 Temperature probe 16 Fr. 4 days         Sheath 11/28/22 1900 Left upper;lateral 1 day              Airway  Duration                  Airway - Non-Surgical 11/25/22 5 days              Arterial Line  Duration             Arterial Line 11/26/22 1600 Left Radial 3 days              Peripheral Intravenous Line  Duration                  External Jugular IV 11/25/22 1834 4 days         Peripheral IV - Single Lumen 11/27/22 2230 16 G Anterior;Right Forearm 2 days         Peripheral IV - Single Lumen 11/27/22 2235 18 G Anterior;Proximal;Right Forearm 2 days         Peripheral IV - Single  Lumen 11/28/22 1600 20 G Posterior;Right Forearm 1 day                    Physical Exam  Vitals reviewed.   Constitutional:       Appearance: He is ill-appearing.      Interventions: He is intubated.      Comments: UNRESPONSIVE   HENT:      Head: Normocephalic.      Mouth/Throat:      Mouth: Mucous membranes are dry.   Eyes:      Pupils:      Right eye: Pupil is sluggish.      Left eye: Pupil is sluggish.   Cardiovascular:      Rate and Rhythm: Normal rate. Rhythm irregular.      Pulses: Normal pulses.   Pulmonary:      Effort: Pulmonary effort is normal. He is intubated.      Breath sounds: Normal breath sounds.   Abdominal:      General: There is no distension.      Palpations: Abdomen is soft.   Musculoskeletal:      Right lower leg: No edema.      Left lower leg: No edema.   Skin:     Capillary Refill: Capillary refill takes less than 2 seconds.   Neurological:      Mental Status: He is unresponsive.      GCS: GCS eye subscore is 1. GCS verbal subscore is 1. GCS motor subscore is 1.       Significant Labs: All pertinent lab results from the last 24 hours have been reviewed.    Significant Imaging: Echocardiogram: Transthoracic echo (TTE) complete (Cupid Only):   Results for orders placed or performed during the hospital encounter of 11/25/22   Echo   Result Value Ref Range    EF 25 %    Left Ventricular Outflow Tract Mean Gradient 0.40 mmHg    AORTIC VALVE CUSP SEPERATION 1.48 cm    LVIDd 3.66 3.5 - 6.0 cm    IVS 1.35 (A) 0.6 - 1.1 cm    Posterior Wall 1.47 (A) 0.6 - 1.1 cm    Ao root annulus 2.60 cm    LVIDs 3.12 2.1 - 4.0 cm    FS 15 28 - 44 %    IVC ostium 2.5 cm    LV mass 186.19 g    LA size 3.10 cm    RVDD 4.90 cm    TAPSE 1.80 cm    Left Ventricle Relative Wall Thickness 0.80 cm    AV mean gradient 1 mmHg    AV valve area 2.16 cm2    AV Velocity Ratio 0.67     AV index (prosthetic) 0.63     MV mean gradient 33 mmHg    MV valve area by continuity eq 0.18 cm2    E wave deceleration time 167.00 msec    LVOT  diameter 2.10 cm    LVOT area 3.5 cm2    LVOT peak justin 0.4 m/s    LVOT peak VTI 5.00 cm    Ao peak justin 0.6 m/s    Ao VTI 8.00 cm    LVOT stroke volume 17.31 cm3    AV peak gradient 1 mmHg    MV peak gradient 51 mmHg    MV Peak E Justin 2.04 m/s    TR Max Justin 2.60 m/s    MV VTI 96.0 cm    LV Systolic Volume 38.50 mL    LV Diastolic Volume 56.60 mL    Echo EF Estimated 25 %    RA Major Axis 5.10 cm    Triscuspid Valve Regurgitation Peak Gradient 27 mmHg    Narrative    · Atrial fibrillation observed.  · The left ventricle is normal in size with mild concentric hypertrophy   and severely decreased systolic function.  · The estimated ejection fraction is 25%.  · There are segmental left ventricular wall motion abnormalities; apex is   akinetic as welll as anterior and kyia-septal wall is akinetic (LAD   infarct vs. Takasubo)  · Moderate right ventricular enlargement with severely reduced right   ventricular systolic function.  · Severe right atrial enlargement.  · Moderate-to-severe mitral regurgitation.  · Mechanically ventilated; cannot use inferior caval vein diameter to   estimate central venous pressure.        Assessment and Plan:     Brief HPI:  68 yo M presents to [facility] ED via EMS after cardiac arrest. History is per chart review. Pt was at a motel with a friend, who gave the hx. Pt had gotten up to take his medications then laid down and became unresponsive. EMS intubated, gave 0.4 Narcan, 3 epi and 1 bicarb and started chest compressions. ROSC was achieved as soon as EMS pulled up to the ED       * Cardiac arrest  - Concern for cardiogenic shock  - Echo suggestive of biventricular failure  - Etiology likely severe RV failure  - Primary team managing.   - [facility] when stable and targeted temperature therapy completed. Likely Monday.     11/29/22  - Seen by   - Pt remains critical requiring mechanical ventilation and multiple vasopressor support  - Uncertain etiology of cardiac arrest. According to  notes this was a PEA arrest no shock able rhythm. ROSC after approximately 20-30 minutes  - Urine drug screen + for cocaine  - Consider LHC/RHC. Will attempt to discuss with family tomorrow.    11/30/22  - Pt continues to require mechanical ventilation and vasopressor support. Levophed weaned off. Dobutamine infusing and Dopamine titrated down  - Pt currently not sedated and unresponsive. Pupils sluggish to light. No respirations noted  over ventilator. Discussed with critical care team and at this time it is felt heart catherization not warranted unless improvement of neurological status.  - Cardiology will sign off at this time.         Shock  - Undifferentiated shock likely cardiogenic as EF is 25% and presence of severe RV failure.   - Continue antibiotics x 48 hours. Discontinue antibiotic once blood Cx negative.     Biventricular heart failure with reduced left ventricular function  Patient is identified as having Systolic (HFrEF) heart failure that is Acute. CHF is currently uncontrolled due to Rales/crackles on pulmonary exam. Latest ECHO performed and demonstrates- Results for orders placed during the hospital encounter of 11/25/22    - Echo:Atrial fibrillation. LVEF 25%. Segmental LV wall abnormalities. [place], anterior and anteroseptal wall is akinetic (LAD infarct vs Takasubo). Moderate RVE with severely reduced RV systolic function. Severe LAURITA. Moderate to severe MR.   - Continue Furosemide and monitor clinical status closely. Monitor on telemetry. Patient is on CHF pathway.  Monitor strict Is&Os and daily weights.  Place on fluid restriction of 1.5 L. Continue to stress to patient importance of self efficacy and  on diet for CHF. Last BNP reviewed- 5,100.     - Dopamine and Levophed  - Lasix 60 mg Q12H  - No ACEi/ARB or BB 2/2 low BP requiring pressors.       Atrial fibrillation with RVR  - Patient with atrial fibrillation which is controlled currently   - [id number] Score: 1. HASBLED Score: >3  (high bleed risk)  - Hold Eliquis for likely LHC on Monday. Heparin gtt.           NSTEMI (non-ST elevated myocardial infarction)  - Echo: Abnormal wall abnormities. Takasubo vs LAD MI infarct  - ACS protocol  - LIONEL: 49 (intermediate/ high risk). Heart: 8 (high). Ana: 156 (25% probability of death from admission to 6 months).  - EKG: A fib with . T wave inversions in precordial leads.   - Troponin 334-->295-->375-->326  - Continue ASA, Atorvastatin 80 mg.   - No ACEi/ARB or BB 2/2 low BP requiring pressors. UDS also positive for cocaine.   - Heparin gtt.         VTE Risk Mitigation (From admission, onward)         Ordered     heparin (porcine) injection 5,000 Units  Every 8 hours         11/28/22 1818     IP VTE LOW RISK PATIENT  Once         11/25/22 2103     Place NATALIIA hose  Until discontinued         11/25/22 2103                Jeffy Gutiérrez, ACNP  Cardiology  [facility]

## 2022-11-30 NOTE — ASSESSMENT & PLAN NOTE
Patient with atrial fibrillation which is controlled currently with Beta Blocker. Patient is currently in atrial fibrillation.[id number] Score: 1. HASBLED Score: >3 (high bleed risk). Anticoagulation indicated. Anticoagulation done with Eliquis. Type of A fib is unknown. All prior EKGs review, with none of them reflecting A fib.   - Discontinue BB 2/2 UDS positive for cocaine. Start Cardizem  mg.    11/26  - after discussion with Dr Purvis-- d/c cardizem- digoxin 250mcg x 1 with amiodarone infusion protocol   11/27- controlled afib at present

## 2022-11-30 NOTE — SUBJECTIVE & OBJECTIVE
Interval History: Intubated, unresponsive with no sedation infusing    Review of Systems   Unable to perform ROS: Intubated   Objective:     Vital Signs (Most Recent):  Temp: 96.8 °F (36 °C) (11/30/22 1500)  Pulse: 90 (11/30/22 1510)  Resp: (!) 21 (11/30/22 1510)  BP: (!) 191/123 (11/30/22 1500)  SpO2: 96 % (11/30/22 1500)   Vital Signs (24h Range):  Temp:  [96.3 °F (35.7 °C)-99 °F (37.2 °C)] 96.8 °F (36 °C)  Pulse:  [] 90  Resp:  [17-21] 21  SpO2:  [77 %-100 %] 96 %  BP: (127-191)/() 191/123     Weight: 55.4 kg (122 lb 2.2 oz)  Body mass index is 17.52 kg/m².     SpO2: 96 %  O2 Device (Oxygen Therapy): ventilator      Intake/Output Summary (Last 24 hours) at 11/30/2022 1519  Last data filed at 11/30/2022 1501  Gross per 24 hour   Intake 1877.1 ml   Output 5500 ml   Net -3622.9 ml       Lines/Drains/Airways       Drain  Duration                  NG/OG Tube 11/25/22 2330 Center mouth 4 days         Urethral Catheter 11/25/22 1842 Temperature probe 16 Fr. 4 days         Sheath 11/28/22 1900 Left upper;lateral 1 day              Airway  Duration                  Airway - Non-Surgical 11/25/22 5 days              Arterial Line  Duration             Arterial Line 11/26/22 1600 Left Radial 3 days              Peripheral Intravenous Line  Duration                  External Jugular IV 11/25/22 1834 4 days         Peripheral IV - Single Lumen 11/27/22 2230 16 G Anterior;Right Forearm 2 days         Peripheral IV - Single Lumen 11/27/22 2235 18 G Anterior;Proximal;Right Forearm 2 days         Peripheral IV - Single Lumen 11/28/22 1600 20 G Posterior;Right Forearm 1 day                    Physical Exam  Vitals reviewed.   Constitutional:       Appearance: He is ill-appearing.      Interventions: He is intubated.      Comments: UNRESPONSIVE   HENT:      Head: Normocephalic.      Mouth/Throat:      Mouth: Mucous membranes are dry.   Eyes:      Pupils:      Right eye: Pupil is sluggish.      Left eye: Pupil is  sluggish.   Cardiovascular:      Rate and Rhythm: Normal rate. Rhythm irregular.      Pulses: Normal pulses.   Pulmonary:      Effort: Pulmonary effort is normal. He is intubated.      Breath sounds: Normal breath sounds.   Abdominal:      General: There is no distension.      Palpations: Abdomen is soft.   Musculoskeletal:      Right lower leg: No edema.      Left lower leg: No edema.   Skin:     Capillary Refill: Capillary refill takes less than 2 seconds.   Neurological:      Mental Status: He is unresponsive.      GCS: GCS eye subscore is 1. GCS verbal subscore is 1. GCS motor subscore is 1.       Significant Labs: All pertinent lab results from the last 24 hours have been reviewed.    Significant Imaging: Echocardiogram: Transthoracic echo (TTE) complete (Cupid Only):   Results for orders placed or performed during the hospital encounter of 11/25/22   Echo   Result Value Ref Range    EF 25 %    Left Ventricular Outflow Tract Mean Gradient 0.40 mmHg    AORTIC VALVE CUSP SEPERATION 1.48 cm    LVIDd 3.66 3.5 - 6.0 cm    IVS 1.35 (A) 0.6 - 1.1 cm    Posterior Wall 1.47 (A) 0.6 - 1.1 cm    Ao root annulus 2.60 cm    LVIDs 3.12 2.1 - 4.0 cm    FS 15 28 - 44 %    IVC ostium 2.5 cm    LV mass 186.19 g    LA size 3.10 cm    RVDD 4.90 cm    TAPSE 1.80 cm    Left Ventricle Relative Wall Thickness 0.80 cm    AV mean gradient 1 mmHg    AV valve area 2.16 cm2    AV Velocity Ratio 0.67     AV index (prosthetic) 0.63     MV mean gradient 33 mmHg    MV valve area by continuity eq 0.18 cm2    E wave deceleration time 167.00 msec    LVOT diameter 2.10 cm    LVOT area 3.5 cm2    LVOT peak justin 0.4 m/s    LVOT peak VTI 5.00 cm    Ao peak justin 0.6 m/s    Ao VTI 8.00 cm    LVOT stroke volume 17.31 cm3    AV peak gradient 1 mmHg    MV peak gradient 51 mmHg    MV Peak E Justin 2.04 m/s    TR Max Justin 2.60 m/s    MV VTI 96.0 cm    LV Systolic Volume 38.50 mL    LV Diastolic Volume 56.60 mL    Echo EF Estimated 25 %    RA Major Axis 5.10 cm     Triscuspid Valve Regurgitation Peak Gradient 27 mmHg    Narrative    · Atrial fibrillation observed.  · The left ventricle is normal in size with mild concentric hypertrophy   and severely decreased systolic function.  · The estimated ejection fraction is 25%.  · There are segmental left ventricular wall motion abnormalities; apex is   akinetic as welll as anterior and kiya-septal wall is akinetic (LAD   infarct vs. Takasubo)  · Moderate right ventricular enlargement with severely reduced right   ventricular systolic function.  · Severe right atrial enlargement.  · Moderate-to-severe mitral regurgitation.  · Mechanically ventilated; cannot use inferior caval vein diameter to   estimate central venous pressure.

## 2022-11-30 NOTE — ASSESSMENT & PLAN NOTE
- Concern for cardiogenic shock  - Echo suggestive of biventricular failure  - Etiology likely severe RV failure  - Primary team managing.   - LHC when stable and targeted temperature therapy completed. Likely Monday.     11/29/22  - Seen by   - Pt remains critical requiring mechanical ventilation and multiple vasopressor support  - Uncertain etiology of cardiac arrest. According to notes this was a PEA arrest no shock able rhythm. ROSC after approximately 20-30 minutes  - Urine drug screen + for cocaine  - Consider LHC/RHC. Will attempt to discuss with family tomorrow.

## 2022-11-30 NOTE — PROGRESS NOTES
[facility]  Adult Nutrition  Follow-up Note         Reason for Assessment  Reason For Assessment: RD follow-up  Nutrition Risk Screen: no indicators present    RD reviewing pt for follow up. Pt off tube feedings for possible heart cath, but not currently appropriate for heart cath, so NP alright with resuming tube feedings.     Recommend re-initiate Suplena 1.8 tube feeding at 10ml/hr and advance up 10ml q6-8 hours as medically appropriate and tolerated. Goal rate Suplena 1.8 @45ml/hr provides 1944 kcal (meets 104% of energy needs), 49g protein (meets 90% of protein needs), and 724ml water. Recommend free water flush 40ml/hr, providing 960 ml. Formula and FWF provides 1684ml total. Keep HOB 35-45 degrees, monitor for abdominal pain/distention, n/v/c/d, gastric residuals >500ml.    MD continues to note unresponsive with poor prognosis.     Per Nephrology today:   11/30: Polyuric with lasix. UOP up to 5.7L. Likely some post ATN diuresis.    Patient # is considered underweight and protein-energy malnutrition grade I, needs adjusted. Patient with MILY, severe sepsis - protein needs adjusted. RD will continue to monitor renal labs and adjust protein recommendations as appropriate. Patient NPO - intubated and sedated.    Last BM 11/26 per flowsheets. Will continue to monitor tube feeding initiation/tolerance/adequacy, weight trend, meds, labs, updates in patient condition. RD following.     Malnutrition  RD to perform NFPE when appropriate. Still not appropriate due to pt unresponsive, unable to consent and having myoclonic jerking limiting ability.     Nutrition Diagnosis  Altered Nutrition Related Lab Values   related to Renal dysfunction as evidenced by elevated BUN/creatinine levels, lowered eGFR level    Nutrition Diagnosis Status: New    Nutrition Risk  Level of Risk/Frequency of Follow-up: high   Chewing or Swallowing Difficulty?: patient intubated and sedated    Estimated/Assessed  Needs  Weight Used For Calorie Calculations: 55.4 kg (122 lb 2.2 oz)   Energy Need Method: Kcal/kg Energy Calorie Requirements (kcal): 2716-5831 kcal (25-30 kcal/kg)  Weight Used For Protein Calculations: 55.4 kg (122 lb 2.2 oz)  Protein Requirements: 55-66 g PRO (1.0-1.2 g PRO/kg)  Estimated Fluid Requirement Method: RDA Method    RDA Method (mL): 1385     Nutrition Prescription / Recommendations  Recommendation/Intervention: Recommend initiate Suplena 1.8 tube feeding at 10ml/hr and advance up 10ml q6-8 hours as medically appropriate and tolerated. Goal rate Suplena 1.8 @45ml/hr provides 1944 kcal (meets 104% of energy needs), 49g protein (meets 100% of protein needs), and 724ml water. Recommend free water flush 40ml/hr, providing 960 ml. Formula and FWF provides 1684ml total. With diprivan at 1.6ml/hr providing 42kcal, total kcal would be 1986 kcal (meets 106% of energy needs). Keep HOB 35-45 degrees, monitor for abdominal pain/distention, n/v/c/d, gastric residuals >500ml.  Goals: TF initiation/tolerance/adequacy, weight maintenance/gradual weight gain  Nutrition Goal Status: new  Current Diet Order: NPO  Nutrition Order Comments: Patient is intubated and sedated  Recommended Diet: Enteral Nutrition  Recommended Oral Supplement: No Oral Supplements  Is Nutrition Support Recommended: Yes     Needs Calculated  Energy Need Method: Kcal/kg Energy Calorie Requirements (kcal): 6356-4775 kcal (25-30 kcal/kg)  Protein Requirements: 55-66 g PRO (1.0-1.2 g PRO/kg)  Enteral Nutrition   Enteral Nutrition Formula Provides:  1944 kcals Propofol Rate: No  49 g Protein  210 g Carbohydrates  105 g Fat Propofol Rate: no  724 ml Fluid without Flush    960 ml Fluid by flush   1684 ml Total Fluid  Enteral Nutrition Recommended Order:  Tube feeding via Oral Gastric  Tube feeding formula: Suplena 1.8 Continuous 45 ml/h  Free Water Flush: 40 ml hourly  Modular Supplements:No Modular Supplements needed  Enteral Nutrition meets needs?:  yes  Enteral Nutrition Status: Recommended but Not Ordered  Is Education Recommended: No    Monitor and Evaluation  % current Intake: NPO  % intake to meet estimated needs: Enteral Nutrition   Food and Nutrient Intake: enteral nutrition intake  Food and Nutrient Adminstration: enteral and parenteral nutrition administration  Knowledge/Beliefs/Attitudes: beliefs and attitudes, food and nutrition knowledge/skill  Physical Activity and Function: nutrition-related ADLs and IADLs, factors affecting access to physical activity  Anthropometric Measurements: weight, weight change, body mass index  Biochemical Data, Medical Tests and Procedures: electrolyte and renal panel, gastrointestinal profile, glucose/endocrine profile, inflammatory profile, lipid profile  Nutrition-Focused Physical Findings: overall appearance, extremities, muscles and bones, head and eyes, skin     Current Medical Diagnosis and Past Medical History  Diagnosis: other (see comments) (cardiac arrest)  Past Medical History:   Diagnosis Date    A-fib     Colitis     COPD (chronic obstructive pulmonary disease)     High cholesterol     Hypertension      Nutrition/Diet History  Food Allergies: NKFA    Lab/Procedures/Meds  Recent Labs   Lab 11/30/22  0623 11/30/22  0819 11/30/22  1441     --   --    K 3.0*  --   --    BUN 61*  --   --    CREATININE 3.57*  --   --    *  --   --    CALCIUM 8.1*  --   --    ALBUMIN 2.8*  --   --    CL 94*  --   --    ALT 48  --   --    AST 49*  --   --    PHOS  --    < > 5.5*    < > = values in this interval not displayed.     Last A1c:   Lab Results   Component Value Date    HGBA1C 5.9 11/26/2022    HGBA1C 5.7 (H) 06/19/2019     Lab Results   Component Value Date    RBC 2.63 (L) 11/30/2022    HGB 7.8 (L) 11/30/2022    HCT 22.5 (L) 11/30/2022    MCV 85.6 11/30/2022    MCH 29.7 11/30/2022    MCHC 34.7 11/30/2022     Pertinent Labs Reviewed: reviewed  Pertinent Labs Comments: K 3.0(L), Cl 94(L), CO2 34(H), BUN 61  "(H), creatinine 3.57 (H), eGFR 18 (L), Ca 8.1 (L), Phos 5.5 (H), Mg 2.4 (H), total protein 6.2 (L), albumin 2.8 (L) - patient with MILY   (H) - patient with no PMH of DM, elevated GLU likely r/t stress response, norepinephrine  AST 49 (H), ALT 69 (H) - patient with cardiac arrest  Pertinent Medications Reviewed: reviewed  Pertinent Medications Comments: aspirin, atorvastatin, famotidine, gabapentin, heparin, ipratropium, levalbuterol, levetiracetam, methylprednisolone, metolazone, oseltamivir, zosyn, Kcl, cordarone, dobutamine, dopamine, midazolam, norepinephrine, propofol (0 ml/hr)    Anthropometrics  Temp: 97.3 °F (36.3 °C)  Height: 5' 10" (177.8 cm)  Height (inches): 70 in  Weight Method: Bed Scale  Weight: 55.3 kg (122 lb)  Weight (lb): 122 lb  Ideal Body Weight (IBW), Male: 166 lb  % Ideal Body Weight, Male (lb): 73.49 %  BMI (Calculated): 17.5  BMI Grade: 17 - 18.4 protein-energy malnutrition grade I     Nutrition by Nursing  Diet/Nutrition Received: tube feeding  Last Bowel Movement: 11/26/22    Nutrition Follow-Up  RD Follow-up?: Yes  " On re-assessment after suctioning and tylenol, no improvement. Persistent retractions, RR worse to 60s-70s and intermittent desaturations lowest to 89. Will place nCPAP due to tachypnea and desaturations.  HERB Ash PGY2 concern for tachypnea and difficulty breathing will start cpap and re-assess.

## 2022-11-30 NOTE — ASSESSMENT & PLAN NOTE
Possible etiologies include:   - Ischemic heart disease including ACS: Although troponin elevated, the likelihood of ACS is unknown as pt received prolonged chest compressions (20 min). EKG did not show ST deviations. Echo ejection fraction 25%  - Hypoxemia: Pt with COPDe and PNA 2/2 influenza A with superimposed bacterial infection , which cannot be ruled out.   - Valvular heart disease or Hypertrophic CM: Echo pending.   - Arrhythmia: EKG shows A fib with RVR.   - Prolonged QT: EKG with QTc 459.   - Cocaine-induced arrhythmias or MI: UDS cocaine positive     Unlikely etiologies include:  - Intracranial hemorrhage: Unlikely as CT head unremarkable   - PE: CTA showed no PE. Venous doppler normal  - Aortic dissection: No significant widened mediastinal on CXR.   - Acute pericardial tamponade: Not reflected on EKG or CTA.   - Pneumothorax: CXR blackman snot reflect pneumothorax.   Patient remains unresponsive

## 2022-11-30 NOTE — ASSESSMENT & PLAN NOTE
- Concern for cardiogenic shock  - Echo suggestive of biventricular failure  - Etiology likely severe RV failure  - Primary team managing.   - LHC when stable and targeted temperature therapy completed. Likely Monday.     11/29/22  - Seen by   - Pt remains critical requiring mechanical ventilation and multiple vasopressor support  - Uncertain etiology of cardiac arrest. According to notes this was a PEA arrest no shock able rhythm. ROSC after approximately 20-30 minutes  - Urine drug screen + for cocaine  - Consider LHC/RHC. Will attempt to discuss with family tomorrow.    11/30/22  - Pt continues to require mechanical ventilation and vasopressor support. Levophed weaned off. Dobutamine infusing and Dopamine titrated down  - Pt currently not sedated and unresponsive. Pupils sluggish to light. No respirations noted  over ventilator. Discussed with critical care team and at this time it is felt heart catherization not warranted unless improvement of neurological status.  - Cardiology will sign off at this time.

## 2022-11-30 NOTE — PROGRESS NOTES
"[facility] Consult Follow-Up Note     HPI:   68 yo male with medical history significant for COPD, HTN, A fib who presents to the hospital on 11/25 with CP and SOB. History obtained via chart review due to patient being intubated and sedated. Shortly after arrival, patient experienced cardiac arrest. ROSC obtained after 20 min. Patient has since been found to have shock suspected to be septic in nature as well as MILY. Also noted to have UDS positive for cocaine benzodiazepines on arrival. Nephrology consulted for oliguric MILY.     Subjective/Interval History:  Polyuric with lasix. UOP up to 5.7L. Likely some post ATN diuresis.    Objective     Medications:   aspirin  81 mg Per OG tube Daily    atorvastatin  80 mg Per OG tube QHS    famotidine (PF)  20 mg Intravenous Q12H    gabapentin  300 mg Oral BID    heparin (porcine)  5,000 Units Subcutaneous Q8H    ipratropium  0.5 mg Nebulization Q8H    levalbuterol  1.25 mg Nebulization Q8H    levetiracetam IV  1,000 mg Intravenous Q12H    methylPREDNISolone sodium succinate injection  40 mg Intravenous Q6H    metOLazone  5 mg Oral BID    mupirocin   Nasal BID    oseltamivir  30 mg Oral Daily    piperacillin-tazobactam (ZOSYN) IVPB  4.5 g Intravenous Q12H    potassium chloride  40 mEq Intravenous Q6H       Physical Exam:   BP (!) 175/94   Pulse 76   Temp 96.3 °F (35.7 °C)   Resp 18   Ht 5' 10" (1.778 m)   Wt 55.4 kg (122 lb 2.2 oz)   SpO2 100%   BMI 17.52 kg/m²   Constitutional: lying in bed, critically ill  Eyes: EOMI, white sclera  ENMT: moist mucus membranes, nares patent  Cardiovascular: normal rate, S1/S2 noted, no edema  Respiratory: symmetrical chest expansion, CTA-B  Gastrointestinal: +BS, soft, NT/ND  Musculoskeletal: normal, no joint erythema/effusions  Skin: no rash, no purpura, warm extremities  Neurological: intubated    I/Os:   I/O last 3 completed shifts:  In: 4081.1 [I.V.:3297.4; Blood:350; IV Piggyback:433.7]  Out: 9775 " [Urine:6275]    Labs, micro, imaging reviewed.       Assessment and Plan:     Patient Active Problem List   Diagnosis    SOB (shortness of breath) on exertion    Follow up    Chest pain    Stage 2 moderate COPD by GOLD classification    HTN (hypertension)    HLD (hyperlipidemia)    Drug abuse    Alcohol abuse    Tobacco abuse    Right upper quadrant abdominal pain    Elbow swelling, left    Constipation    Black stools    Olecranon bursitis of left elbow    Left lower quadrant abdominal pain    Abdominal pain    Hernia of abdominal wall    Weight loss    Unilateral inguinal hernia without obstruction or gangrene    Abdominal wall seroma    Cardiac arrest    Acute respiratory failure with hypoxia and hypercarbia    Severe sepsis    MILY (acute kidney injury)    Pneumonia due to influenza A virus    COPD exacerbation    NSTEMI (non-ST elevated myocardial infarction)    Atrial fibrillation with RVR    D-dimer, elevated    Biventricular heart failure with reduced left ventricular function    Shock    Cardiomyopathy    Hypoxic encephalopathy    Other specified anemias     MILY  - Baseline cr 0.-91  - MILY in setting of hypoperfusion, possible ATN from recent cardiac arrest  - Electrolytes stable. Adequate UOP. No urgent need for RRT.  - Will DC lasix. sCr improving. Monitor.   - I ordered K replacement today  - Please avoid nephrotoxic agents/NSAIDs  - Renally dose all medications   - Please obtain daily BMP, Mg, and Phos levels  - Please monitor strict UOP  - Daily weights    Thank you for this consult. [facility] will continue to follow along. Please call with any questions.     Alisha S. Parker, DO [facility]   11/30/2022

## 2022-12-01 NOTE — SUBJECTIVE & OBJECTIVE
Interval History:  Unresponsive    Objective:     Vital Signs (Most Recent):  Temp: 96.6 °F (35.9 °C) (12/01/22 0100)  Pulse: 101 (12/01/22 0400)  Resp: 18 (12/01/22 0400)  BP: (!) 197/87 (12/01/22 0100)  SpO2: 100 % (12/01/22 0400)   Vital Signs (24h Range):  Temp:  [96.1 °F (35.6 °C)-97.3 °F (36.3 °C)] 96.6 °F (35.9 °C)  Pulse:  [] 101  Resp:  [16-21] 18  SpO2:  [77 %-100 %] 100 %  BP: (151-205)/() 197/87     Weight: 55 kg (121 lb 4.1 oz)  Body mass index is 17.4 kg/m².      Intake/Output Summary (Last 24 hours) at 12/1/2022 0619  Last data filed at 12/1/2022 0516  Gross per 24 hour   Intake 1564.67 ml   Output 3000 ml   Net -1435.33 ml       Physical Exam  Vitals reviewed.   Constitutional:       Appearance: Normal appearance.      Interventions: He is not intubated.  HENT:      Head: Normocephalic and atraumatic.      Nose: Nose normal.      Mouth/Throat:      Mouth: Mucous membranes are dry.      Pharynx: Oropharynx is clear.   Eyes:      Extraocular Movements: Extraocular movements intact.      Conjunctiva/sclera: Conjunctivae normal.      Pupils: Pupils are equal, round, and reactive to light.   Cardiovascular:      Rate and Rhythm: Normal rate.      Heart sounds: Normal heart sounds. No murmur heard.  Pulmonary:      Effort: Pulmonary effort is normal. He is not intubated.      Breath sounds: Normal breath sounds.   Abdominal:      General: Abdomen is flat. Bowel sounds are normal.      Palpations: Abdomen is soft.   Musculoskeletal:         General: Normal range of motion.      Cervical back: Normal range of motion and neck supple.      Right lower leg: No edema.      Left lower leg: No edema.   Skin:     General: Skin is warm and dry.      Capillary Refill: Capillary refill takes less than 2 seconds.   Neurological:      General: No focal deficit present.      Mental Status: He is alert and oriented to person, place, and time.   Psychiatric:         Mood and Affect: Mood normal.          Behavior: Behavior normal.       Vents:  Vent Mode: A/C (12/01/22 0400)  Ventilator Initiated: Yes (11/25/22 1730)  Set Rate: 18 BPM (12/01/22 0400)  Vt Set: 550 mL (12/01/22 0400)  PEEP/CPAP: 5 cmH20 (12/01/22 0400)  Oxygen Concentration (%): 40 (12/01/22 0400)  Peak Airway Pressure: 20 cmH20 (12/01/22 0400)  Total Ve: 9.3 L/m (12/01/22 0400)  F/VT Ratio<105 (RSBI): (!) 34.62 (12/01/22 0400)    Lines/Drains/Airways       Drain  Duration                  NG/OG Tube 11/25/22 2330 Center mouth 5 days         Urethral Catheter 11/25/22 1842 Temperature probe 16 Fr. 5 days         Sheath 11/28/22 1900 Left upper;lateral 2 days              Airway  Duration                  Airway - Non-Surgical 11/25/22 6 days              Arterial Line  Duration             Arterial Line 11/26/22 1600 Left Radial 4 days              Peripheral Intravenous Line  Duration                  External Jugular IV 11/25/22 1834 5 days         Peripheral IV - Single Lumen 11/27/22 2230 16 G Anterior;Right Forearm 3 days         Peripheral IV - Single Lumen 11/27/22 2235 18 G Anterior;Proximal;Right Forearm 3 days         Peripheral IV - Single Lumen 11/28/22 1600 20 G Posterior;Right Forearm 2 days                    Significant Labs:    CBC/Anemia Profile:  Recent Labs   Lab 11/30/22  0624 12/01/22 0118   WBC 9.49 11.68*   HGB 7.8* 7.7*   HCT 22.5* 23.0*    267   MCV 85.6 87.1   RDW 14.4 14.6*        Chemistries:  Recent Labs   Lab 11/30/22  0623 11/30/22  0819 11/30/22  1441 12/01/22  0110 12/01/22  0118     --   --   --  135*   K 3.0*  --   --   --  3.4*   CL 94*  --   --   --  95*   CO2 34*  --   --   --  33*   BUN 61*  --   --   --  59*   CREATININE 3.57*  --   --   --  3.37*   CALCIUM 8.1*  --   --   --  8.4*   ALBUMIN 2.8*  --   --   --  2.7*   PROT 6.2*  --   --   --  6.7   BILITOT 0.7  --   --   --  0.6   ALKPHOS 61  --   --   --  66   ALT 48  --   --   --  48   AST 49*  --   --   --  64*   MG  --  2.3 2.4* 2.4*  --     PHOS  --  5.5* 5.5* 4.7*  --        Recent Lab Results  (Last 5 results in the past 24 hours)        12/01/22  0118   12/01/22  0110   11/30/22  1441   11/30/22  1117   11/30/22  0819        Albumin/Globulin Ratio 0.7               Albumin 2.7               Alkaline Phosphatase 66               ALT 48               Anion Gap 10               AST 64               Baso # 0.01               Basophil % 0.1               BILIRUBIN TOTAL 0.6               BUN 59               BUN/CREAT RATIO 18               Calcium 8.4               Chloride 95               CO2 33               Creatinine 3.37               Differential Type Manual               eGFR 19               Eos # 0.00               Eosinophil % 0.0               Globulin, Total 4.0               Glucose 118               Hematocrit 23.0               Hemoglobin 7.7               Immature Grans (Abs) 0.08               Immature Granulocytes 0.7               Lymph # 0.12               Lymph % 1.0                1               Magnesium   2.4   2.4     2.3       MCH 29.2               MCHC 33.5               MCV 87.1               Mono # 0.78               Mono % 6.7                4               MPV 10.0               Neutrophils, Abs 10.69               Neutrophils Relative 91.5               nRBC 1                0.0               NUCLEATED RBC ABSOLUTE 0.00               Ovalocytes               Phosphorus   4.7   5.5     5.5       PLATELET MORPHOLOGY Large & Giant Platelets               Platelets 267               POC Glucose       120         Poly               Potassium 3.4               PROTEIN TOTAL 6.7               RBC 2.64               RBC Morphology Normal               RDW 14.6               Segmented Neutrophils, Man % 95               Sodium 135               Target Cells               TSH               Vancomycin, Random 18.1               WBC 11.68                                      Significant Imaging:  I have reviewed all pertinent  imaging results/findings within the past 24 hours.

## 2022-12-01 NOTE — PROGRESS NOTES
[facility]  Pulmonology  Progress Note    Patient Name: Joao Chi Sr  MRN: [id number]  Admission Date: 11/25/2022  Hospital Length of Stay: 6 days  Code Status: Full Code  Attending Provider: Parmjit Whaley MD  Primary Care Provider: Cornelia Weiss MD   Principal Problem: Cardiac arrest    Subjective:     Interval History:  Unresponsive    Objective:     Vital Signs (Most Recent):  Temp: 96.6 °F (35.9 °C) (12/01/22 0100)  Pulse: 101 (12/01/22 0400)  Resp: 18 (12/01/22 0400)  BP: (!) 197/87 (12/01/22 0100)  SpO2: 100 % (12/01/22 0400)   Vital Signs (24h Range):  Temp:  [96.1 °F (35.6 °C)-97.3 °F (36.3 °C)] 96.6 °F (35.9 °C)  Pulse:  [] 101  Resp:  [16-21] 18  SpO2:  [77 %-100 %] 100 %  BP: (151-205)/() 197/87     Weight: 55 kg (121 lb 4.1 oz)  Body mass index is 17.4 kg/m².      Intake/Output Summary (Last 24 hours) at 12/1/2022 0619  Last data filed at 12/1/2022 0516  Gross per 24 hour   Intake 1564.67 ml   Output 3000 ml   Net -1435.33 ml       Physical Exam  Vitals reviewed.   Constitutional:       Appearance: Normal appearance.      Interventions: He is not intubated.  HENT:      Head: Normocephalic and atraumatic.      Nose: Nose normal.      Mouth/Throat:      Mouth: Mucous membranes are dry.      Pharynx: Oropharynx is clear.   Eyes:      Extraocular Movements: Extraocular movements intact.      Conjunctiva/sclera: Conjunctivae normal.      Pupils: Pupils are equal, round, and reactive to light.   Cardiovascular:      Rate and Rhythm: Normal rate.      Heart sounds: Normal heart sounds. No murmur heard.  Pulmonary:      Effort: Pulmonary effort is normal. He is not intubated.      Breath sounds: Normal breath sounds.   Abdominal:      General: Abdomen is flat. Bowel sounds are normal.      Palpations: Abdomen is soft.   Musculoskeletal:         General: Normal range of motion.      Cervical back: Normal range of motion and neck supple.      Right lower leg: No edema.       Left lower leg: No edema.   Skin:     General: Skin is warm and dry.      Capillary Refill: Capillary refill takes less than 2 seconds.   Neurological:      General: No focal deficit present.      Mental Status: He is alert and oriented to person, place, and time.   Psychiatric:         Mood and Affect: Mood normal.         Behavior: Behavior normal.       Vents:  Vent Mode: A/C (12/01/22 0400)  Ventilator Initiated: Yes (11/25/22 1730)  Set Rate: 18 BPM (12/01/22 0400)  Vt Set: 550 mL (12/01/22 0400)  PEEP/CPAP: 5 cmH20 (12/01/22 0400)  Oxygen Concentration (%): 40 (12/01/22 0400)  Peak Airway Pressure: 20 cmH20 (12/01/22 0400)  Total Ve: 9.3 L/m (12/01/22 0400)  F/VT Ratio<105 (RSBI): (!) 34.62 (12/01/22 0400)    Lines/Drains/Airways       Drain  Duration                  NG/OG Tube 11/25/22 2330 Center mouth 5 days         Urethral Catheter 11/25/22 1842 Temperature probe 16 Fr. 5 days         Sheath 11/28/22 1900 Left upper;lateral 2 days              Airway  Duration                  Airway - Non-Surgical 11/25/22 6 days              Arterial Line  Duration             Arterial Line 11/26/22 1600 Left Radial 4 days              Peripheral Intravenous Line  Duration                  External Jugular IV 11/25/22 1834 5 days         Peripheral IV - Single Lumen 11/27/22 2230 16 G Anterior;Right Forearm 3 days         Peripheral IV - Single Lumen 11/27/22 2235 18 G Anterior;Proximal;Right Forearm 3 days         Peripheral IV - Single Lumen 11/28/22 1600 20 G Posterior;Right Forearm 2 days                    Significant Labs:    CBC/Anemia Profile:  Recent Labs   Lab 11/30/22  0624 12/01/22  0118   WBC 9.49 11.68*   HGB 7.8* 7.7*   HCT 22.5* 23.0*    267   MCV 85.6 87.1   RDW 14.4 14.6*        Chemistries:  Recent Labs   Lab 11/30/22  0623 11/30/22  0819 11/30/22  1441 12/01/22  0110 12/01/22 0118     --   --   --  135*   K 3.0*  --   --   --  3.4*   CL 94*  --   --   --  95*   CO2 34*  --   --    --  33*   BUN 61*  --   --   --  59*   CREATININE 3.57*  --   --   --  3.37*   CALCIUM 8.1*  --   --   --  8.4*   ALBUMIN 2.8*  --   --   --  2.7*   PROT 6.2*  --   --   --  6.7   BILITOT 0.7  --   --   --  0.6   ALKPHOS 61  --   --   --  66   ALT 48  --   --   --  48   AST 49*  --   --   --  64*   MG  --  2.3 2.4* 2.4*  --    PHOS  --  5.5* 5.5* 4.7*  --        Recent Lab Results  (Last 5 results in the past 24 hours)        12/01/22  0118   12/01/22  0110   11/30/22  1441   11/30/22  1117   11/30/22  0819        Albumin/Globulin Ratio 0.7               Albumin 2.7               Alkaline Phosphatase 66               ALT 48               Anion Gap 10               AST 64               Baso # 0.01               Basophil % 0.1               BILIRUBIN TOTAL 0.6               BUN 59               BUN/CREAT RATIO 18               Calcium 8.4               Chloride 95               CO2 33               Creatinine 3.37               Differential Type Manual               eGFR 19               Eos # 0.00               Eosinophil % 0.0               Globulin, Total 4.0               Glucose 118               Hematocrit 23.0               Hemoglobin 7.7               Immature Grans (Abs) 0.08               Immature Granulocytes 0.7               Lymph # 0.12               Lymph % 1.0                1               Magnesium   2.4   2.4     2.3       MCH 29.2               MCHC 33.5               MCV 87.1               Mono # 0.78               Mono % 6.7                4               MPV 10.0               Neutrophils, Abs 10.69               Neutrophils Relative 91.5               nRBC 1                0.0               NUCLEATED RBC ABSOLUTE 0.00               Ovalocytes               Phosphorus   4.7   5.5     5.5       PLATELET MORPHOLOGY Large & Giant Platelets               Platelets 267               POC Glucose       120         Poly               Potassium 3.4               PROTEIN TOTAL 6.7               RBC 2.64                RBC Morphology Normal               RDW 14.6               Segmented Neutrophils, Man % 95               Sodium 135               Target Cells               TSH               Vancomycin, Random 18.1               WBC 11.68                                      Significant Imaging:  I have reviewed all pertinent imaging results/findings within the past 24 hours.    Assessment/Plan:     * Cardiac arrest  Possible etiologies include:   - Ischemic heart disease including ACS: Although troponin elevated, the likelihood of ACS is unknown as pt received prolonged chest compressions (20 min). EKG did not show ST deviations. Echo ejection fraction 25%  - Hypoxemia: Pt with COPDe and PNA 2/2 influenza A with superimposed bacterial infection , which cannot be ruled out.   - Valvular heart disease or Hypertrophic CM: Echo pending.   - Arrhythmia: EKG shows A fib with RVR.   - Prolonged QT: EKG with QTc 459.   - Cocaine-induced arrhythmias or MI: UDS cocaine positive     Unlikely etiologies include:  - Intracranial hemorrhage: Unlikely as CT head unremarkable   - PE: CTA showed no PE. Venous doppler normal  - Aortic dissection: No significant widened mediastinal on CXR.   - Acute pericardial tamponade: Not reflected on EKG or CTA.   - Pneumothorax: CXR blackman snot reflect pneumothorax.   Patient remains unresponsive will discuss family prognosis and plan today I think we should remove life support    Acute respiratory failure with hypoxia and hypercarbia  Currently being ventilated as a consequence of having acute respiratory failure after cardiac arrest    Hypoxic encephalopathy  Having some myoclonic jerks I believe were going to repeat EEG    MILY (acute kidney injury)  Kidney function little bit better today urine output is excellent    Atrial fibrillation with RVR  Patient with atrial fibrillation which is controlled currently with Beta Blocker. Patient is currently in atrial fibrillation.[id number] Score: 1. HASILIANAD  Score: >3 (high bleed risk). Anticoagulation indicated. Anticoagulation done with Eliquis. Type of A fib is unknown. All prior EKGs review, with none of them reflecting A fib.   - Discontinue BB 2/2 UDS positive for cocaine. Start Cardizem  mg.    11/26  - after discussion with Dr Purvis-- d/c cardizem- digoxin 250mcg x 1 with amiodarone infusion protocol   11/27- controlled afib at present       NSTEMI (non-ST elevated myocardial infarction)  Unable to place right heart catheterization will watch     Pneumonia due to influenza A virus  -finish treatment                 Parmjit Whaley MD  Pulmonology  [facility]

## 2022-12-01 NOTE — PLAN OF CARE
Phoned family early this day left message x2 for daughter. Attempt to set up time to have family discussion with NP and MD. Will follow up.    1415  Spoke with daughter Judie now, made her aware the NP and MD would like to speak with her. she will be here after, unable to give exact time as she lives 1 hour away and is at work until 430ish. Will follow dc needs as arise.

## 2022-12-01 NOTE — ASSESSMENT & PLAN NOTE
Patient with atrial fibrillation which is controlled currently with Beta Blocker. Patient is currently in atrial fibrillation.[id number] Score: 1. HASBLED Score: >3 (high bleed risk). Anticoagulation indicated. Anticoagulation done with Eliquis. Type of A fib is unknown. All prior EKGs review, with none of them reflecting A fib.   - Discontinue BB 2/2 UDS positive for cocaine. Start Cardizem  mg.    11/26  - after discussion with Dr Purvis-- d/c cardizem- digoxin 250mcg x 1 with amiodarone infusion protocol   11/27- controlled afib at present

## 2022-12-01 NOTE — PROGRESS NOTES
"[facility] Consult Follow-Up Note     HPI:   66 yo male with medical history significant for COPD, HTN, A fib who presents to the hospital on 11/25 with CP and SOB. History obtained via chart review due to patient being intubated and sedated. Shortly after arrival, patient experienced cardiac arrest. ROSC obtained after 20 min. Patient has since been found to have shock suspected to be septic in nature as well as MILY. Also noted to have UDS positive for cocaine benzodiazepines on arrival. Nephrology consulted for oliguric MILY.     Subjective/Interval History:  UOP 3L without diuretics now having post ATN diuresis.    Objective     Medications:   aspirin  81 mg Per OG tube Daily    atorvastatin  80 mg Per OG tube QHS    famotidine (PF)  20 mg Intravenous Q12H    gabapentin  300 mg Oral BID    heparin (porcine)  5,000 Units Subcutaneous Q8H    ipratropium  0.5 mg Nebulization Q8H    levalbuterol  1.25 mg Nebulization Q8H    levetiracetam IV  1,000 mg Intravenous Q12H    methylPREDNISolone sodium succinate injection  40 mg Intravenous Q6H    metOLazone  5 mg Oral BID    piperacillin-tazobactam (ZOSYN) IVPB  4.5 g Intravenous Q12H       Physical Exam:   BP (!) 175/82   Pulse 88   Temp 97 °F (36.1 °C)   Resp 18   Ht 5' 10" (1.778 m)   Wt 55 kg (121 lb 4.1 oz)   SpO2 100%   BMI 17.40 kg/m²   Constitutional: lying in bed, critically ill  Eyes: EOMI, white sclera  ENMT: moist mucus membranes, nares patent  Cardiovascular: normal rate, S1/S2 noted, no edema  Respiratory: symmetrical chest expansion, CTA-B  Gastrointestinal: +BS, soft, NT/ND  Musculoskeletal: normal, no joint erythema/effusions  Skin: no rash, no purpura, warm extremities  Neurological: intubated    I/Os:   I/O last 3 completed shifts:  In: 2111.2 [I.V.:832; NG/GT:260; IV Piggyback:1019.2]  Out: 6000 [Urine:6000]    Labs, micro, imaging reviewed.       Assessment and Plan:     Patient Active Problem List   Diagnosis    SOB (shortness of " breath) on exertion    Follow up    Chest pain    Stage 2 moderate COPD by GOLD classification    HTN (hypertension)    HLD (hyperlipidemia)    Drug abuse    Alcohol abuse    Tobacco abuse    Right upper quadrant abdominal pain    Elbow swelling, left    Constipation    Black stools    Olecranon bursitis of left elbow    Left lower quadrant abdominal pain    Abdominal pain    Hernia of abdominal wall    Weight loss    Unilateral inguinal hernia without obstruction or gangrene    Abdominal wall seroma    Cardiac arrest    Acute respiratory failure with hypoxia and hypercarbia    Severe sepsis    MILY (acute kidney injury)    Pneumonia due to influenza A virus    COPD exacerbation    NSTEMI (non-ST elevated myocardial infarction)    Atrial fibrillation with RVR    D-dimer, elevated    Biventricular heart failure with reduced left ventricular function    Shock    Cardiomyopathy    Hypoxic encephalopathy    Other specified anemias     MILY  - Baseline cr 0.-91  - MILY in setting of hypoperfusion, possible ATN from recent cardiac arrest  - Electrolytes stable. Adequate UOP. No urgent need for RRT.  - Renal function improving. Allow patient to self correct.   - Please avoid nephrotoxic agents/NSAIDs  - Renally dose all medications   - Please obtain daily BMP, Mg, and Phos levels  - Please monitor strict UOP  - Daily weights    Thank you for this consult. [facility] will continue to follow along. Please call with any questions.     Diamond Harry, DO  [facility]   12/01/2022

## 2022-12-01 NOTE — PROGRESS NOTES
Pharmacist Renal Dose Adjustment Note    Joao Chi Sr is a 67 y.o. male being treated with the medication Pepcid    Patient Data:    Vital Signs (Most Recent):  Temp: 97.3 °F (36.3 °C) (12/01/22 1330)  Pulse: 89 (12/01/22 1330)  Resp: (!) 21 (12/01/22 1330)  BP: (!) 187/89 (12/01/22 1300)  SpO2: 99 % (12/01/22 1245)   Vital Signs (72h Range):  Temp:  [94.3 °F (34.6 °C)-99 °F (37.2 °C)]   Pulse:  []   Resp:  [12-36]   BP: ()/()   SpO2:  [63 %-100 %]      Recent Labs   Lab 11/29/22  0459 11/30/22  0623 12/01/22  0118   CREATININE 3.72* 3.57* 3.37*     Serum creatinine: 3.37 mg/dL (H) 12/01/22 0118  Estimated creatinine clearance: 16.1 mL/min (A)    Medication:Pepcid dose: 20mg frequency bid will be changed to medication:Pepcid dose:20mg frequency:daily    Pharmacist's Name: Jan Cuellar  Pharmacist's Extension: [id number]

## 2022-12-02 NOTE — PROGRESS NOTES
"[facility] Consult Follow-Up Note     HPI:   68 yo male with medical history significant for COPD, HTN, A fib who presents to the hospital on 11/25 with CP and SOB. History obtained via chart review due to patient being intubated and sedated. Shortly after arrival, patient experienced cardiac arrest. ROSC obtained after 20 min. Patient has since been found to have shock suspected to be septic in nature as well as MILY. Also noted to have UDS positive for cocaine benzodiazepines on arrival. Nephrology consulted for oliguric MILY.     Subjective/Interval History:  Still having adequate UOP  Mental status not improved  Family looking toward palliative measures     Objective     Medications:   amiodarone  200 mg Oral Daily    aspirin  81 mg Per OG tube Daily    atorvastatin  80 mg Per OG tube QHS    famotidine (PF)  20 mg Intravenous Daily    gabapentin  300 mg Oral BID    heparin (porcine)  5,000 Units Subcutaneous Q8H    ipratropium  0.5 mg Nebulization Q8H    levalbuterol  1.25 mg Nebulization Q8H    levetiracetam IV  1,000 mg Intravenous Q12H    metOLazone  5 mg Oral BID       Physical Exam:   BP (!) 153/69   Pulse (!) 112   Temp 98.1 °F (36.7 °C)   Resp 19   Ht 5' 10" (1.778 m)   Wt 57.2 kg (126 lb 1.7 oz)   SpO2 100%   BMI 18.09 kg/m²   Constitutional: lying in bed, critically ill  Eyes: EOMI, white sclera  ENMT: moist mucus membranes, nares patent  Cardiovascular: normal rate, S1/S2 noted, no edema  Respiratory: symmetrical chest expansion, CTA-B  Gastrointestinal: +BS, soft, NT/ND  Musculoskeletal: normal, no joint erythema/effusions  Skin: no rash, no purpura, warm extremities  Neurological: intubated, myoclonic jerks     I/Os:   I/O last 3 completed shifts:  In: 1752.8 [I.V.:512.8; NG/GT:1140; IV Piggyback:100]  Out: 2600 [Urine:2600]    Labs, micro, imaging reviewed.       Assessment and Plan:     Patient Active Problem List   Diagnosis    SOB (shortness of breath) on exertion    Follow up "    Chest pain    Stage 2 moderate COPD by GOLD classification    HTN (hypertension)    HLD (hyperlipidemia)    Drug abuse    Alcohol abuse    Tobacco abuse    Right upper quadrant abdominal pain    Elbow swelling, left    Constipation    Black stools    Olecranon bursitis of left elbow    Left lower quadrant abdominal pain    Abdominal pain    Hernia of abdominal wall    Weight loss    Unilateral inguinal hernia without obstruction or gangrene    Abdominal wall seroma    Cardiac arrest    Acute respiratory failure with hypoxia and hypercarbia    Severe sepsis    MILY (acute kidney injury)    Pneumonia due to influenza A virus    COPD exacerbation    NSTEMI (non-ST elevated myocardial infarction)    Atrial fibrillation with RVR    D-dimer, elevated    Biventricular heart failure with reduced left ventricular function    Shock    Cardiomyopathy    Hypoxic encephalopathy    Other specified anemias     MILY  - Baseline cr 0.-91  - MILY in setting of hypoperfusion, possible ATN from recent cardiac arrest  - Electrolytes stable. Adequate UOP. No urgent need for RRT.  - Renal function improving. Allow patient to self correct.   - Please avoid nephrotoxic agents/NSAIDs  - Renally dose all medications   - Please obtain daily BMP, Mg, and Phos levels  - Please monitor strict UOP  - Daily weights    Thank you for this consult. Renal function improving. Family moving toward palliative care. [facility] will sign off. Please call with any questions.     Diamond Harry, DO [facility]   12/02/2022

## 2022-12-02 NOTE — PLAN OF CARE
Problem: Adult Inpatient Plan of Care  Goal: Plan of Care Review  12/2/2022 0730 by Francisca Newberry RN  Outcome: Ongoing, Progressing  12/2/2022 0729 by Francisca Newberry RN  Outcome: Ongoing, Progressing  Goal: Patient-Specific Goal (Individualized)  12/2/2022 0730 by Francisca Newberry RN  Outcome: Ongoing, Progressing  12/2/2022 0729 by Francisca Newberry RN  Outcome: Ongoing, Progressing  Goal: Absence of Hospital-Acquired Illness or Injury  12/2/2022 0730 by Francisca Newberry RN  Outcome: Ongoing, Progressing  12/2/2022 0729 by Francisca Newberry RN  Outcome: Ongoing, Progressing  Goal: Optimal Comfort and Wellbeing  12/2/2022 0730 by Francisca Newberry RN  Outcome: Ongoing, Progressing  12/2/2022 0729 by Francisca Newberry RN  Outcome: Ongoing, Progressing  Goal: Readiness for Transition of Care  12/2/2022 0730 by Francisca Newberry RN  Outcome: Ongoing, Progressing  12/2/2022 0729 by Francisca Newberry RN  Outcome: Ongoing, Progressing     Problem: Communication Impairment (Mechanical Ventilation, Invasive)  Goal: Effective Communication  12/2/2022 0730 by Francisca Newberry RN  Outcome: Ongoing, Progressing  12/2/2022 0729 by Francisca Newberry RN  Outcome: Ongoing, Progressing     Problem: Device-Related Complication Risk (Mechanical Ventilation, Invasive)  Goal: Optimal Device Function  12/2/2022 0730 by Francisca Newberry RN  Outcome: Ongoing, Progressing  12/2/2022 0729 by Francisca Newberry RN  Outcome: Ongoing, Progressing     Problem: Inability to Wean (Mechanical Ventilation, Invasive)  Goal: Mechanical Ventilation Liberation  12/2/2022 0730 by Francisca Newberry RN  Outcome: Ongoing, Progressing  12/2/2022 0729 by Francisca Newberry RN  Outcome: Ongoing, Progressing     Problem: Nutrition Impairment (Mechanical Ventilation, Invasive)  Goal: Optimal Nutrition Delivery  12/2/2022 0730 by Francisca Newberry, RN  Outcome: Ongoing, Progressing  12/2/2022 0729 by  Francisca Newberry RN  Outcome: Ongoing, Progressing     Problem: Skin and Tissue Injury (Mechanical Ventilation, Invasive)  Goal: Absence of Device-Related Skin and Tissue Injury  12/2/2022 0730 by Francisca Newberry RN  Outcome: Ongoing, Progressing  12/2/2022 0729 by Francisca Newberry RN  Outcome: Ongoing, Progressing     Problem: Ventilator-Induced Lung Injury (Mechanical Ventilation, Invasive)  Goal: Absence of Ventilator-Induced Lung Injury  12/2/2022 0730 by Francisca Newberry RN  Outcome: Ongoing, Progressing  12/2/2022 0729 by Francisca Newberry RN  Outcome: Ongoing, Progressing     Problem: Communication Impairment (Artificial Airway)  Goal: Effective Communication  12/2/2022 0730 by Francisca Newberry RN  Outcome: Ongoing, Progressing  12/2/2022 0729 by Francisca Newberry RN  Outcome: Ongoing, Progressing     Problem: Device-Related Complication Risk (Artificial Airway)  Goal: Optimal Device Function  12/2/2022 0730 by Francisca Newberry RN  Outcome: Ongoing, Progressing  12/2/2022 0729 by Francisca Newberry RN  Outcome: Ongoing, Progressing     Problem: Skin and Tissue Injury (Artificial Airway)  Goal: Absence of Device-Related Skin or Tissue Injury  12/2/2022 0730 by Francisca Newberry RN  Outcome: Ongoing, Progressing  12/2/2022 0729 by Francisca Newberry RN  Outcome: Ongoing, Progressing     Problem: Noninvasive Ventilation Acute  Goal: Effective Unassisted Ventilation and Oxygenation  12/2/2022 0730 by Francisca Newberry RN  Outcome: Ongoing, Progressing  12/2/2022 0729 by Francisca Newberry RN  Outcome: Ongoing, Progressing     Problem: Adjustment to Illness (Sepsis/Septic Shock)  Goal: Optimal Coping  12/2/2022 0730 by Francisca Newberry RN  Outcome: Ongoing, Progressing  12/2/2022 0729 by Francisca Newberry RN  Outcome: Ongoing, Progressing     Problem: Bleeding (Sepsis/Septic Shock)  Goal: Absence of Bleeding  12/2/2022 0730 by Francisca Newberry RN  Outcome:  Ongoing, Progressing  12/2/2022 0729 by Francisca Newberry RN  Outcome: Ongoing, Progressing     Problem: Glycemic Control Impaired (Sepsis/Septic Shock)  Goal: Blood Glucose Level Within Desired Range  12/2/2022 0730 by Francisca Newberry RN  Outcome: Ongoing, Progressing  12/2/2022 0729 by Francisca Newberry RN  Outcome: Ongoing, Progressing     Problem: Infection Progression (Sepsis/Septic Shock)  Goal: Absence of Infection Signs and Symptoms  12/2/2022 0730 by Francisca Newberry RN  Outcome: Ongoing, Progressing  12/2/2022 0729 by Francisca Newberry RN  Outcome: Ongoing, Progressing     Problem: Nutrition Impaired (Sepsis/Septic Shock)  Goal: Optimal Nutrition Intake  12/2/2022 0730 by Francisca Newberry RN  Outcome: Ongoing, Progressing  12/2/2022 0729 by Francisca Newberry RN  Outcome: Ongoing, Progressing     Problem: Fluid and Electrolyte Imbalance (Acute Kidney Injury/Impairment)  Goal: Fluid and Electrolyte Balance  12/2/2022 0730 by Francisca Newberry RN  Outcome: Ongoing, Progressing  12/2/2022 0729 by Francisca Newberry RN  Outcome: Ongoing, Progressing     Problem: Oral Intake Inadequate (Acute Kidney Injury/Impairment)  Goal: Optimal Nutrition Intake  12/2/2022 0730 by Francisca Newberry RN  Outcome: Ongoing, Progressing  12/2/2022 0729 by Francisca Newberry RN  Outcome: Ongoing, Progressing     Problem: Renal Function Impairment (Acute Kidney Injury/Impairment)  Goal: Effective Renal Function  12/2/2022 0730 by Francisca Newberry RN  Outcome: Ongoing, Progressing  12/2/2022 0729 by Francisca Newberry RN  Outcome: Ongoing, Progressing     Problem: Infection  Goal: Absence of Infection Signs and Symptoms  12/2/2022 0730 by Francisca Newberry RN  Outcome: Ongoing, Progressing  12/2/2022 0729 by Francisca Newberry RN  Outcome: Ongoing, Progressing     Problem: Skin Injury Risk Increased  Goal: Skin Health and Integrity  12/2/2022 0730 by Francisca Newberry RN  Outcome:  Ongoing, Progressing  12/2/2022 0729 by Francisca Newberry, RN  Outcome: Ongoing, Progressing

## 2022-12-02 NOTE — PROGRESS NOTES
"[facility]  Adult Nutrition  Follow Up Note    SUMMARY       Recommendations    Recommendation/Intervention: Recommend continuing Suplena @ 45ml/hr to provide: 1944 kcal (meets 104% of energy needs), 49g protein (meets 100% of protein needs), and 724ml water. Recommend free water flush 40ml/hr, providing 960 ml. Formula and FWF provides 1684ml total. Keep HOB @ 30-45 degrees. Monitor for abd distention/pain, n/v/diarrhea, residuals >500 mL.  Goals: tube feeding tolerance/adequacy, weight maintenance/gradual weight gain  Nutrition Goal Status: progressing towards goal    Assessment and Plan  Follow up. Pt remains intubated on vent with tube feedings of Suplena 1.8 infusing @ 45 mL/hr + free water flush @ 40 mL/hr providin kcal (meets 104% of energy needs), 49g protein (meets 100% of protein needs), 1684ml fluid/day. Tolerating per flowsheets. Continue tube feeding as able/appropriate and tolerated.     Keep HOB @ 30-45 degrees. Monitor for abd distention, n/v/diarrhea, residuals >500 mL.     Current weight is 126 lbs, indicating ~5 lb weight gain x1 day - possibly fluid related as pt with altered renal function at this time. Will monitor weight trends and adjust free water flush recommendation if needed.     Per Nephrology on : "Thank you for this consult. Renal function improving. Family moving toward palliative care. [facility] will sign off"    Per MD note : "Possibly remove life support on Saturday no neurological response"     Last BM  per flowsheets. Labs/emds reviewed. RD following and will continue to provide recommendations that coincide with POC and goals of care.     Nutrition Diagnosis  Altered Nutrition Related Lab Values   related to Renal dysfunction as evidenced by elevated BUN/creatinine levels, lowered eGFR level     Nutrition Diagnosis Status: Continues    Reason for Assessment    Reason For Assessment: RD follow-up  Diagnosis: other (see comments) " "(cardiac arrest)  Relevant Medical History: HTN, HLD, drug abuse, hernia of abdominal wall, acute respiratory failure with hypoxia and hypercarbia, severe sepsis, MILY, pneumonia due to influenza A virus, COPD exacerbation, NSTEMI, Afib with RVR, D-dimer elevated, biventricular heart failure with reduced left ventricular function, shock, cardiomyopathy, hypoxic encephalopathy  Interdisciplinary Rounds: attended    Nutrition Risk Screen    Nutrition Risk Screen: no indicators present    Nutrition/Diet History    Food Allergies: NKFA    Anthropometrics    Temp: 98.1 °F (36.7 °C)  Height: 5' 10" (177.8 cm)  Height (inches): 70 in  Weight Method: Bed Scale  Weight: 57.2 kg (126 lb 1.7 oz)  Weight (lb): 126.1 lb  Ideal Body Weight (IBW), Male: 166 lb  % Ideal Body Weight, Male (lb): 73.49 %  BMI (Calculated): 18.1  BMI Grade: 17 - 18.4 protein-energy malnutrition grade I       Lab/Procedures/Meds   Latest Reference Range & Units 12/02/22 04:44   Sodium 136 - 145 mmol/L 137   Potassium 3.5 - 5.1 mmol/L 3.8   Chloride 98 - 107 mmol/L 96 (L)   CO2 21 - 32 mmol/L 34 (H)   Anion Gap 7 - 16 mmol/L 11   BUN 7 - 18 mg/dL 63 (H)   Creatinine 0.70 - 1.30 mg/dL 3.21 (H)   BUN/CREAT RATIO 6 - 20  20   eGFR >=60 mL/min/1.73m² 20 (L)   Glucose 74 - 106 mg/dL 83   Calcium 8.5 - 10.1 mg/dL 8.8   Alkaline Phosphatase 45 - 115 U/L 73   PROTEIN TOTAL 6.4 - 8.2 g/dL 6.4   Albumin 3.5 - 5.0 g/dL 2.8 (L)   Albumin/Globulin Ratio  0.8   BILIRUBIN TOTAL >0.0 - 1.2 mg/dL 0.5   AST 15 - 37 U/L 94 (H)   ALT 16 - 61 U/L 59   (L): Data is abnormally low  (H): Data is abnormally high    Note: Elevated BUN, Cr and low GFR - no PMH of CKD - will continue to monitor renal labs. Low albumin. Elevated AST.     Pertinent Labs Reviewed: reviewed  Pertinent Medications Reviewed: reviewed  Pertinent Medications Comments: amiodarone, aspirin, atorvstatin, famotidine, gabapentin, heparin, ipratropium, levalbuterol, levetiracetam, metolazone, " vancocin    Nutrition Prescription Ordered    Current Diet Order: NPO  Nutrition Order Comments: Patient is intubated and sedated  Current Nutrition Support Formula Ordered: Suplena  Current Nutrition Support Rate Ordered: 45 (ml)  Current Nutrition Support Frequency Ordered: continuous    Evaluation of Received Nutrient/Fluid Intake    Enteral Calories (kcal): 1944  Enteral Protein (gm): 49  Enteral (Free Water) Fluid (mL): 724  Free Water Flush Fluid (mL): 960  % Kcal Needs: 104  % Protein Needs: 90  Tolerance: tolerating  % Intake of Estimated Energy Needs: 75 - 100 %  % Meal Intake: NPO    Nutrition Risk    Level of Risk/Frequency of Follow-up: high     Monitor and Evaluation    Food and Nutrient Intake: enteral nutrition intake  Food and Nutrient Adminstration: enteral and parenteral nutrition administration  Knowledge/Beliefs/Attitudes: beliefs and attitudes, food and nutrition knowledge/skill  Physical Activity and Function: nutrition-related ADLs and IADLs, factors affecting access to physical activity  Anthropometric Measurements: weight, weight change, body mass index  Biochemical Data, Medical Tests and Procedures: electrolyte and renal panel, gastrointestinal profile, glucose/endocrine profile, inflammatory profile, lipid profile  Nutrition-Focused Physical Findings: overall appearance, extremities, muscles and bones, head and eyes, skin     Nutrition Follow-Up    RD Follow-up?: Yes

## 2022-12-02 NOTE — PLAN OF CARE
Problem: Communication Impairment (Mechanical Ventilation, Invasive)  Goal: Effective Communication  Outcome: Ongoing, Progressing     Problem: Device-Related Complication Risk (Mechanical Ventilation, Invasive)  Goal: Optimal Device Function  Outcome: Ongoing, Progressing     Problem: Inability to Wean (Mechanical Ventilation, Invasive)  Goal: Mechanical Ventilation Liberation  Outcome: Ongoing, Not Progressing     Problem: Skin and Tissue Injury (Mechanical Ventilation, Invasive)  Goal: Absence of Device-Related Skin and Tissue Injury  Outcome: Ongoing, Progressing     Problem: Ventilator-Induced Lung Injury (Mechanical Ventilation, Invasive)  Goal: Absence of Ventilator-Induced Lung Injury  Outcome: Ongoing, Progressing     Problem: Communication Impairment (Artificial Airway)  Goal: Effective Communication  Outcome: Ongoing, Progressing     Problem: Device-Related Complication Risk (Artificial Airway)  Goal: Optimal Device Function  Outcome: Ongoing, Progressing     Problem: Skin and Tissue Injury (Artificial Airway)  Goal: Absence of Device-Related Skin or Tissue Injury  Outcome: Ongoing, Progressing

## 2022-12-02 NOTE — PROGRESS NOTES
Vancomycin trough resulted as 12.5. Pharmacy has been pulse dosing in light of worsening renal function. Given 1x dose of vancomycin 1000 mg today. Random level ordered for 12/4 at 0430. Pharmacy will continue to follow and make necessary adjustments.    Jo Johnson, PharmD  Ext. 1992

## 2022-12-02 NOTE — SUBJECTIVE & OBJECTIVE
Interval History:  Patient unresponsive    Objective:     Vital Signs (Most Recent):  Temp: 98.2 °F (36.8 °C) (12/02/22 0600)  Pulse: 110 (12/02/22 0600)  Resp: 20 (12/02/22 0600)  BP: 131/80 (12/02/22 0600)  SpO2: 100 % (12/02/22 0600) Vital Signs (24h Range):  Temp:  [96.8 °F (36 °C)-98.4 °F (36.9 °C)] 98.2 °F (36.8 °C)  Pulse:  [] 110  Resp:  [16-22] 20  SpO2:  [82 %-100 %] 100 %  BP: (121-195)/() 131/80     Weight: 57.2 kg (126 lb 1.7 oz)  Body mass index is 18.09 kg/m².      Intake/Output Summary (Last 24 hours) at 12/2/2022 0625  Last data filed at 12/2/2022 0601  Gross per 24 hour   Intake 1335.57 ml   Output 1500 ml   Net -164.43 ml       Physical Exam  Vitals reviewed.   Constitutional:       Appearance: Normal appearance.      Interventions: He is intubated.   HENT:      Head: Normocephalic and atraumatic.      Nose: Nose normal.      Mouth/Throat:      Mouth: Mucous membranes are dry.      Pharynx: Oropharynx is clear.   Eyes:      Extraocular Movements: Extraocular movements intact.      Conjunctiva/sclera: Conjunctivae normal.      Pupils: Pupils are equal, round, and reactive to light.   Cardiovascular:      Rate and Rhythm: Normal rate.      Heart sounds: Normal heart sounds. No murmur heard.  Pulmonary:      Effort: Pulmonary effort is normal. He is intubated.      Breath sounds: Normal breath sounds.   Abdominal:      General: Abdomen is flat. Bowel sounds are normal.      Palpations: Abdomen is soft.   Musculoskeletal:         General: Normal range of motion.      Cervical back: Normal range of motion and neck supple.      Right lower leg: No edema.      Left lower leg: No edema.   Skin:     General: Skin is warm and dry.      Capillary Refill: Capillary refill takes less than 2 seconds.   Neurological:      General: No focal deficit present.       Vents:  Vent Mode: A/C (12/02/22 0409)  Ventilator Initiated: Yes (11/25/22 9176)  Set Rate: 18 BPM (12/02/22 4109)  Vt Set: 550 mL  (12/02/22 0409)  PEEP/CPAP: 5 cmH20 (12/02/22 0409)  Oxygen Concentration (%): 40 (12/02/22 0301)  Peak Airway Pressure: 21 cmH20 (12/02/22 0409)  Total Ve: 8.1 L/m (12/02/22 0409)  F/VT Ratio<105 (RSBI): (!) 33.96 (12/01/22 2330)    Lines/Drains/Airways       Drain  Duration                  NG/OG Tube 11/25/22 2330 Center mouth 6 days         Urethral Catheter 11/25/22 1842 Temperature probe 16 Fr. 6 days              Airway  Duration                  Airway - Non-Surgical 11/25/22 7 days              Peripheral Intravenous Line  Duration                  External Jugular IV 11/25/22 1834 6 days         Peripheral IV - Single Lumen 11/27/22 2230 16 G Anterior;Right Forearm 4 days         Peripheral IV - Single Lumen 11/27/22 2235 18 G Anterior;Proximal;Right Forearm 4 days         Peripheral IV - Single Lumen 11/28/22 1600 20 G Posterior;Right Forearm 3 days                    Significant Labs:    CBC/Anemia Profile:  Recent Labs   Lab 12/01/22 0118 12/02/22 0444   WBC 11.68* 14.72*   HGB 7.7* 7.4*   HCT 23.0* 22.3*    287   MCV 87.1 89.6   RDW 14.6* 14.6*        Chemistries:  Recent Labs   Lab 12/01/22 0118 12/01/22  0809 12/01/22  1641 12/01/22 2257 12/02/22 0444   *  --   --   --  137   K 3.4*  --   --   --  3.8   CL 95*  --   --   --  96*   CO2 33*  --   --   --  34*   BUN 59*  --   --   --  63*   CREATININE 3.37*  --   --   --  3.21*   CALCIUM 8.4*  --   --   --  8.8   ALBUMIN 2.7*  --   --   --  2.8*   PROT 6.7  --   --   --  6.4   BILITOT 0.6  --   --   --  0.5   ALKPHOS 66  --   --   --  73   ALT 48  --   --   --  59   AST 64*  --   --   --  94*   MG  --  1.9 2.6* 2.4*  --    PHOS  --  3.9 4.5 3.6  --        Recent Lab Results         12/02/22  0444   12/01/22  2257   12/01/22  1641   12/01/22  0809        Albumin/Globulin Ratio 0.8             Albumin 2.8             Alkaline Phosphatase 73             ALT 59             Anion Gap 11             Aniso 1+             AST 94              Baso # 0.01             Basophil % 0.1             BILIRUBIN TOTAL 0.5             BUN 63             BUN/CREAT RATIO 20             Calcium 8.8             Chloride 96             CO2 34             Creatinine 3.21             Differential Type Manual             eGFR 20             Eos # 0.01             Eosinophil % 0.1             Globulin, Total 3.6             Glucose 83             Hematocrit 22.3             Hemoglobin 7.4             Immature Grans (Abs) 0.08             Immature Granulocytes 0.5             Lymph # 0.35             Lymph % 2.4              3             Magnesium   2.4   2.6   1.9       MCH 29.7             MCHC 33.2             MCV 89.6             Mono # 1.42             Mono % 9.6              9             MPV 10.6             Neutrophils, Abs 12.85             Neutrophils Relative 87.3             nRBC 0.1             NUCLEATED RBC ABSOLUTE 0.02             Phosphorus   3.6   4.5   3.9       PLATELET MORPHOLOGY Normal             Platelets 287             Potassium 3.8             PROTEIN TOTAL 6.4             RBC 2.49             RDW 14.6             Segmented Neutrophils, Man % 88             Sodium 137             Vancomycin, Random 12.5             WBC 14.72                     Significant Imaging:  I have reviewed all pertinent imaging results/findings within the past 24 hours.

## 2022-12-02 NOTE — PROGRESS NOTES
[facility]  Pulmonology  Progress Note    Patient Name: Joao Chi Sr  MRN: [id number]  Admission Date: 11/25/2022  Hospital Length of Stay: 7 days  Code Status: Full Code  Attending Provider: Parmjit Whaley MD  Primary Care Provider: Cornelia Weiss MD   Principal Problem: Cardiac arrest    Subjective:     Interval History:  Patient unresponsive    Objective:     Vital Signs (Most Recent):  Temp: 98.2 °F (36.8 °C) (12/02/22 0600)  Pulse: 110 (12/02/22 0600)  Resp: 20 (12/02/22 0600)  BP: 131/80 (12/02/22 0600)  SpO2: 100 % (12/02/22 0600) Vital Signs (24h Range):  Temp:  [96.8 °F (36 °C)-98.4 °F (36.9 °C)] 98.2 °F (36.8 °C)  Pulse:  [] 110  Resp:  [16-22] 20  SpO2:  [82 %-100 %] 100 %  BP: (121-195)/() 131/80     Weight: 57.2 kg (126 lb 1.7 oz)  Body mass index is 18.09 kg/m².      Intake/Output Summary (Last 24 hours) at 12/2/2022 0625  Last data filed at 12/2/2022 0601  Gross per 24 hour   Intake 1335.57 ml   Output 1500 ml   Net -164.43 ml       Physical Exam  Vitals reviewed.   Constitutional:       Appearance: Normal appearance.      Interventions: He is intubated.   HENT:      Head: Normocephalic and atraumatic.      Nose: Nose normal.      Mouth/Throat:      Mouth: Mucous membranes are dry.      Pharynx: Oropharynx is clear.   Eyes:      Extraocular Movements: Extraocular movements intact.      Conjunctiva/sclera: Conjunctivae normal.      Pupils: Pupils are equal, round, and reactive to light.   Cardiovascular:      Rate and Rhythm: Normal rate.      Heart sounds: Normal heart sounds. No murmur heard.  Pulmonary:      Effort: Pulmonary effort is normal. He is intubated.      Breath sounds: Normal breath sounds.   Abdominal:      General: Abdomen is flat. Bowel sounds are normal.      Palpations: Abdomen is soft.   Musculoskeletal:         General: Normal range of motion.      Cervical back: Normal range of motion and neck supple.      Right lower leg: No edema.       Left lower leg: No edema.   Skin:     General: Skin is warm and dry.      Capillary Refill: Capillary refill takes less than 2 seconds.   Neurological:      General: No focal deficit present.       Vents:  Vent Mode: A/C (12/02/22 0409)  Ventilator Initiated: Yes (11/25/22 1730)  Set Rate: 18 BPM (12/02/22 0409)  Vt Set: 550 mL (12/02/22 0409)  PEEP/CPAP: 5 cmH20 (12/02/22 0409)  Oxygen Concentration (%): 40 (12/02/22 0301)  Peak Airway Pressure: 21 cmH20 (12/02/22 0409)  Total Ve: 8.1 L/m (12/02/22 0409)  F/VT Ratio<105 (RSBI): (!) 33.96 (12/01/22 2330)    Lines/Drains/Airways       Drain  Duration                  NG/OG Tube 11/25/22 2330 Center mouth 6 days         Urethral Catheter 11/25/22 1842 Temperature probe 16 Fr. 6 days              Airway  Duration                  Airway - Non-Surgical 11/25/22 7 days              Peripheral Intravenous Line  Duration                  External Jugular IV 11/25/22 1834 6 days         Peripheral IV - Single Lumen 11/27/22 2230 16 G Anterior;Right Forearm 4 days         Peripheral IV - Single Lumen 11/27/22 2235 18 G Anterior;Proximal;Right Forearm 4 days         Peripheral IV - Single Lumen 11/28/22 1600 20 G Posterior;Right Forearm 3 days                    Significant Labs:    CBC/Anemia Profile:  Recent Labs   Lab 12/01/22  0118 12/02/22  0444   WBC 11.68* 14.72*   HGB 7.7* 7.4*   HCT 23.0* 22.3*    287   MCV 87.1 89.6   RDW 14.6* 14.6*        Chemistries:  Recent Labs   Lab 12/01/22  0118 12/01/22  0809 12/01/22  1641 12/01/22  2257 12/02/22  0444   *  --   --   --  137   K 3.4*  --   --   --  3.8   CL 95*  --   --   --  96*   CO2 33*  --   --   --  34*   BUN 59*  --   --   --  63*   CREATININE 3.37*  --   --   --  3.21*   CALCIUM 8.4*  --   --   --  8.8   ALBUMIN 2.7*  --   --   --  2.8*   PROT 6.7  --   --   --  6.4   BILITOT 0.6  --   --   --  0.5   ALKPHOS 66  --   --   --  73   ALT 48  --   --   --  59   AST 64*  --   --   --  94*   MG  --  1.9 2.6*  2.4*  --    PHOS  --  3.9 4.5 3.6  --        Recent Lab Results         12/02/22  0444   12/01/22  2257   12/01/22  1641   12/01/22  0809        Albumin/Globulin Ratio 0.8             Albumin 2.8             Alkaline Phosphatase 73             ALT 59             Anion Gap 11             Aniso 1+             AST 94             Baso # 0.01             Basophil % 0.1             BILIRUBIN TOTAL 0.5             BUN 63             BUN/CREAT RATIO 20             Calcium 8.8             Chloride 96             CO2 34             Creatinine 3.21             Differential Type Manual             eGFR 20             Eos # 0.01             Eosinophil % 0.1             Globulin, Total 3.6             Glucose 83             Hematocrit 22.3             Hemoglobin 7.4             Immature Grans (Abs) 0.08             Immature Granulocytes 0.5             Lymph # 0.35             Lymph % 2.4              3             Magnesium   2.4   2.6   1.9       MCH 29.7             MCHC 33.2             MCV 89.6             Mono # 1.42             Mono % 9.6              9             MPV 10.6             Neutrophils, Abs 12.85             Neutrophils Relative 87.3             nRBC 0.1             NUCLEATED RBC ABSOLUTE 0.02             Phosphorus   3.6   4.5   3.9       PLATELET MORPHOLOGY Normal             Platelets 287             Potassium 3.8             PROTEIN TOTAL 6.4             RBC 2.49             RDW 14.6             Segmented Neutrophils, Man % 88             Sodium 137             Vancomycin, Random 12.5             WBC 14.72                     Significant Imaging:  I have reviewed all pertinent imaging results/findings within the past 24 hours.    Assessment/Plan:     * Cardiac arrest  Possible etiologies include:   - Ischemic heart disease including ACS: Although troponin elevated, the likelihood of ACS is unknown as pt received prolonged chest compressions (20 min). EKG did not show ST deviations. Echo ejection fraction  25%  - Hypoxemia: Pt with COPDe and PNA 2/2 influenza A with superimposed bacterial infection , which cannot be ruled out.   - Valvular heart disease or Hypertrophic CM: Echo pending.   - Arrhythmia: EKG shows A fib with RVR.   - Prolonged QT: EKG with QTc 459.   - Cocaine-induced arrhythmias or MI: UDS cocaine positive     Unlikely etiologies include:  - Intracranial hemorrhage: Unlikely as CT head unremarkable   - PE: CTA showed no PE. Venous doppler normal  - Aortic dissection: No significant widened mediastinal on CXR.   - Acute pericardial tamponade: Not reflected on EKG or CTA.   - Pneumothorax: CXR blackman snot reflect pneumothorax.   Possibly remove life support on Saturday no neurologic response    Acute respiratory failure with hypoxia and hypercarbia  Currently being ventilated as a consequence of having acute respiratory failure after cardiac arrest    Hypoxic encephalopathy  Having some myoclonic jerks I believe were going to repeat EEG    MILY (acute kidney injury)  Seems a little bit better currently    Drug abuse  - UDS positive for cocaine and benzos     HTN (hypertension)  - currently hypotensive on vasopressors                  Parmjit Whaley MD  Pulmonology  [facility]

## 2022-12-02 NOTE — ASSESSMENT & PLAN NOTE
Possible etiologies include:   - Ischemic heart disease including ACS: Although troponin elevated, the likelihood of ACS is unknown as pt received prolonged chest compressions (20 min). EKG did not show ST deviations. Echo ejection fraction 25%  - Hypoxemia: Pt with COPDe and PNA 2/2 influenza A with superimposed bacterial infection , which cannot be ruled out.   - Valvular heart disease or Hypertrophic CM: Echo pending.   - Arrhythmia: EKG shows A fib with RVR.   - Prolonged QT: EKG with QTc 459.   - Cocaine-induced arrhythmias or MI: UDS cocaine positive     Unlikely etiologies include:  - Intracranial hemorrhage: Unlikely as CT head unremarkable   - PE: CTA showed no PE. Venous doppler normal  - Aortic dissection: No significant widened mediastinal on CXR.   - Acute pericardial tamponade: Not reflected on EKG or CTA.   - Pneumothorax: CXR blackman snot reflect pneumothorax.   Possibly remove life support on Saturday no neurologic response

## 2022-12-02 NOTE — PLAN OF CARE
Problem: Communication Impairment (Mechanical Ventilation, Invasive)  Goal: Effective Communication  Outcome: Ongoing, Progressing     Problem: Device-Related Complication Risk (Mechanical Ventilation, Invasive)  Goal: Optimal Device Function  Outcome: Ongoing, Progressing     Problem: Inability to Wean (Mechanical Ventilation, Invasive)  Goal: Mechanical Ventilation Liberation  Outcome: Ongoing, Progressing     Problem: Skin and Tissue Injury (Mechanical Ventilation, Invasive)  Goal: Absence of Device-Related Skin and Tissue Injury  Outcome: Ongoing, Progressing     Problem: Ventilator-Induced Lung Injury (Mechanical Ventilation, Invasive)  Goal: Absence of Ventilator-Induced Lung Injury  Outcome: Ongoing, Progressing     Problem: Communication Impairment (Artificial Airway)  Goal: Effective Communication  Outcome: Ongoing, Progressing     Problem: Device-Related Complication Risk (Artificial Airway)  Goal: Optimal Device Function  Outcome: Ongoing, Progressing

## 2022-12-03 NOTE — ASSESSMENT & PLAN NOTE
Post TTM -- no neurological changes noted in last 4 days- family aware - on dobutamine and dopamine

## 2022-12-03 NOTE — PROGRESS NOTES
[facility]  Pulmonology  Progress Note    Patient Name: Joao Chi Sr  MRN: [id number]  Admission Date: 11/25/2022  Hospital Length of Stay: 8 days  Code Status: DNR  Attending Provider: Parmjit Whaley MD  Primary Care Provider: Cornelia Weiss MD   Principal Problem: Cardiac arrest    Subjective:     Interval History: remains unresponsive on vent. Myoclonic jerks noted     Objective:     Vital Signs (Most Recent):  Temp: 99.5 °F (37.5 °C) (12/03/22 1415)  Pulse: 91 (12/03/22 1415)  Resp: 18 (12/03/22 1415)  BP: (!) 108/57 (12/03/22 1400)  SpO2: 100 % (12/03/22 1345)   Vital Signs (24h Range):  Temp:  [99 °F (37.2 °C)-101.1 °F (38.4 °C)] 99.5 °F (37.5 °C)  Pulse:  [] 91  Resp:  [17-34] 18  SpO2:  [100 %] 100 %  BP: ()/(46-83) 108/57     Weight: 57.2 kg (126 lb 1.7 oz)  Body mass index is 18.09 kg/m².      Intake/Output Summary (Last 24 hours) at 12/3/2022 1503  Last data filed at 12/3/2022 1306  Gross per 24 hour   Intake 1620.81 ml   Output 900 ml   Net 720.81 ml       Physical Exam  Vitals reviewed.   Constitutional:       Interventions: He is intubated.   HENT:      Head: Normocephalic and atraumatic.      Right Ear: External ear normal.      Left Ear: External ear normal.      Nose: Nose normal.      Mouth/Throat:      Mouth: Mucous membranes are moist.   Eyes:      Conjunctiva/sclera: Conjunctivae normal.   Cardiovascular:      Rate and Rhythm: Normal rate and regular rhythm.      Heart sounds: Normal heart sounds. No murmur heard.  Pulmonary:      Effort: Pulmonary effort is normal. He is intubated.      Breath sounds: Normal breath sounds.   Abdominal:      Palpations: Abdomen is soft.   Musculoskeletal:      Right lower leg: No edema.      Left lower leg: No edema.   Skin:     General: Skin is warm and dry.   Neurological:      Mental Status: He is unresponsive.      Motor: Tremor present.       Vents:  Vent Mode: A/C (12/03/22 1100)  Ventilator Initiated: Yes  (11/25/22 1730)  Set Rate: 18 BPM (12/03/22 1100)  Vt Set: 550 mL (12/03/22 1100)  PEEP/CPAP: 5 cmH20 (12/03/22 1100)  Oxygen Concentration (%): 40 (12/03/22 1115)  Peak Airway Pressure: 29 cmH20 (12/03/22 1100)  Total Ve: 9.9 L/m (12/03/22 1100)  F/VT Ratio<105 (RSBI): (!) 34.62 (12/03/22 1100)    Lines/Drains/Airways       Drain  Duration                  NG/OG Tube 11/25/22 2330 Center mouth 7 days         Urethral Catheter 11/25/22 1842 Temperature probe 16 Fr. 7 days              Airway  Duration                  Airway - Non-Surgical 11/25/22 8 days              Peripheral Intravenous Line  Duration                  External Jugular IV 11/25/22 1834 7 days         Peripheral IV - Single Lumen 11/27/22 2230 16 G Anterior;Right Forearm 5 days         Peripheral IV - Single Lumen 11/27/22 2235 18 G Anterior;Proximal;Right Forearm 5 days         Peripheral IV - Single Lumen 11/28/22 1600 20 G Posterior;Right Forearm 4 days                    Significant Labs:    CBC/Anemia Profile:  Recent Labs   Lab 12/02/22 0444 12/03/22  0420   WBC 14.72* 14.24*   HGB 7.4* 6.2*   HCT 22.3* 19.7*    257   MCV 89.6 93.4   RDW 14.6* 14.6*        Chemistries:  Recent Labs   Lab 12/02/22  0444 12/02/22  1107 12/02/22  1543 12/02/22  2335 12/03/22  0420 12/03/22  0833     --   --   --  133*  --    K 3.8  --   --   --  3.8  --    CL 96*  --   --   --  93*  --    CO2 34*  --   --   --  34*  --    BUN 63*  --   --   --  75*  --    CREATININE 3.21*  --   --   --  3.34*  --    CALCIUM 8.8  --   --   --  8.9  --    ALBUMIN 2.8*  --   --   --  2.6*  --    PROT 6.4  --   --   --  6.4  --    BILITOT 0.5  --   --   --  0.5  --    ALKPHOS 73  --   --   --  78  --    ALT 59  --   --   --  49  --    AST 94*  --   --   --  65*  --    MG  --    < > 2.4* 2.3  --  2.6*   PHOS  --    < > 3.7 3.2  --  3.8    < > = values in this interval not displayed.       All pertinent labs within the past 24 hours have been reviewed.    Significant  Imaging:  I have reviewed all pertinent imaging results/findings within the past 24 hours.    Assessment/Plan:     * Cardiac arrest  Post TTM -- no neurological changes noted in last 4 days- family aware - on dobutamine and dopamine     Hypoxic encephalopathy  Having some myoclonic jerks secondary to anoxic jaclyn injury     Biventricular heart failure with reduced left ventricular function    Echo    Interpretation Summary  · Atrial fibrillation observed.  · The left ventricle is normal in size with mild concentric hypertrophy and severely decreased systolic function.  · The estimated ejection fraction is 25%.  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  · Severe right atrial enlargement.  · Moderate-to-severe mitral regurgitation.  · Mechanically ventilated; cannot use inferior caval vein diameter to estimate central venous pressure.    Continue lasix   Plans for [facility]     NSTEMI (non-ST elevated myocardial infarction)  Unable to place right heart catheterization    MILY (acute kidney injury)  Cr 3.34     Acute respiratory failure with hypoxia and hypercarbia  Secondary to cardiac arrest - not assiting over vent     Drug abuse  - UDS positive for cocaine and benzos      Family meeting today with all of his children. Prognosis discussed in detail. Decision was made to make him comfort care. Plan to remove life support today.  This includes 40 minutes of critical care time spent evaluating and managing patient. This includes time spent at bedside, reviewing labs, data, xrays , monitoring for acute decompensation and discussion with family on his code status and plan of care.          Cary Pittman, AG-ACNP  Pulmonology  [facility]

## 2022-12-03 NOTE — ASSESSMENT & PLAN NOTE
Echo    Interpretation Summary  · Atrial fibrillation observed.  · The left ventricle is normal in size with mild concentric hypertrophy and severely decreased systolic function.  · The estimated ejection fraction is 25%.  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  · Severe right atrial enlargement.  · Moderate-to-severe mitral regurgitation.  · Mechanically ventilated; cannot use inferior caval vein diameter to estimate central venous pressure.    Continue lasix   Plans for [facility]

## 2022-12-03 NOTE — DISCHARGE SUMMARY
[facility]  Pulmonology  Discharge Summary      Patient Name: Joao Chi Sr  MRN: [id number]  Admission Date: 2022  Hospital Length of Stay: 8 days  Discharge Date and Time:  2022 4:05 PM  Attending Physician: Parmjit Whaley MD   Discharging Provider: Cary Pittman -ACNP  Primary Care Provider: Cornelia Weiss MD    HPI:     Patient is a 67-year-old male who presented to the emergency room after an witnessed out of the hospital cardiac arrest with non shockable rhythm.  From the time of cardiac arrest to ROSC was approximately 20 minutes.  Troponin was elevated and EKG showed T-wave inversion in precordial leads with no previous EKG for comparison.  Feel that elevated troponin level was due to prolonged CPR and not necessarily due to ACS.  Cause of cardiac arrest is unknown at this time. Cardiology has been consulted and will proceed with echocardiogram.   Due to the prolonged resuscitative time, presence of anoxic brain injury was likely and thus patient was started on targeted temperature management protocol.  Poor prognosis overall.    Procedure(s) (LRB):  INSERTION, CATHETER, RIGHT HEART (N/A)    Indwelling Lines/Drains at Time of Discharge:   Lines/Drains/Airways     None                 Hospital Course:      Hospital course - admitted after outside hospital cardiac arrest. HE suffered another cardiac arrest after admission. UDS positive for cocaine and benzos. Long history of cocaine use according to family. Echo revealed EF of 25%. Too unstable for left heart cath. He was treated for cardiogenic shock with dopamine, dobutamine and levo. Post TTM protocol neurologically he never woke up. He did have myoclonic jerks but no sign of seizures on EEG. He also developed MILY. After discussion with family the decision was made to extubate him and make him comfort care. He was extubated at 1515 today. He was pronounced at 1602. Will be discharge to  home of choice.        Goals of Care Treatment Preferences:  Code Status: DNR      Consults (From admission, onward)        Status Ordering Provider     Inpatient consult to Nephrology  Once        Provider:  Diamond Harry DO    Completed JOHNNY GREER     Pharmacy to dose Vancomycin consult  Once        Provider:  (Not yet assigned)    RUDDY Castillo     Inpatient consult to Cardiology  Once        Provider:  Ananda Broussard MD    Completed RUDDY ABRAMS          Significant Labs:  All pertinent labs within the past 24 hours have been reviewed.    Significant Imaging:  I have reviewed all pertinent imaging results/findings within the past 24 hours.    Pending Diagnostic Studies:     Procedure Component Value Units Date/Time    EXTRA TUBES [[id number]] Collected: 12/01/22 0118    Order Status: Sent Lab Status: In process Updated: 12/01/22 0118    Specimen: Blood, Venous     Narrative:      The following orders were created for panel order EXTRA TUBES.  Procedure                               Abnormality         Status                     ---------                               -----------         ------                     Lavender Top Hold[[id number]]                                In process                   Please view results for these tests on the individual orders.    EXTRA TUBES [[id number]] Collected: 11/28/22 0000    Order Status: Sent Lab Status: In process Updated: 11/29/22 1419    Specimen: Blood, Venous     Narrative:      The following orders were created for panel order EXTRA TUBES.  Procedure                               Abnormality         Status                     ---------                               -----------         ------                     Light Blue Top Hold[[id number]]                                                         Light Green Top Hold[[id number]]                                                        Lavender Top Hold[[id number]]                                In  process                   Please view results for these tests on the individual orders.    EXTRA TUBES [[id number]] Collected: 11/29/22 1157    Order Status: Sent Lab Status: In process Updated: 11/29/22 1157    Specimen: Blood, Venous     Narrative:      The following orders were created for panel order EXTRA TUBES.  Procedure                               Abnormality         Status                     ---------                               -----------         ------                     Lavender Top Hold[[id number]]                                In process                   Please view results for these tests on the individual orders.    EXTRA TUBES [[id number]] Collected: 11/28/22 2352    Order Status: Sent Lab Status: In process Updated: 11/28/22 2352    Specimen: Blood, Venous     Narrative:      The following orders were created for panel order EXTRA TUBES.  Procedure                               Abnormality         Status                     ---------                               -----------         ------                     Lavender Top Hold[[id number]]                                In process                   Please view results for these tests on the individual orders.    EXTRA TUBES [[id number]] Collected: 11/28/22 0526    Order Status: Sent Lab Status: In process Updated: 11/28/22 0554    Specimen: Blood, Venous     Narrative:      The following orders were created for panel order EXTRA TUBES.  Procedure                               Abnormality         Status                     ---------                               -----------         ------                     Gold Top Hold[[id number]]                                    In process                   Please view results for these tests on the individual orders.    EXTRA TUBES [[id number]] Collected: 11/27/22 0750    Order Status: Sent Lab Status: In process Updated: 11/27/22 0809    Specimen: Blood, Venous     Narrative:      The following orders were  created for panel order EXTRA TUBES.  Procedure                               Abnormality         Status                     ---------                               -----------         ------                     Lavender Top Hold[[id number]]                                In process                   Please view results for these tests on the individual orders.    EXTRA TUBES [[id number]] Collected: 11/25/22 1821    Order Status: Sent Lab Status: In process Updated: 11/25/22 1836    Specimen: Blood, Venous     Narrative:      The following orders were created for panel order EXTRA TUBES.  Procedure                               Abnormality         Status                     ---------                               -----------         ------                     Lavender Top Hold[[id number]]                                In process                 Gold Top Hold[[id number]]                                    In process                   Please view results for these tests on the individual orders.        Final Active Diagnoses:    Diagnosis Date Noted POA    PRINCIPAL PROBLEM:  Cardiac arrest [I46.9] 11/25/2022 Yes    Other specified anemias [D64.89] 11/30/2022 Unknown    Hypoxic encephalopathy [G93.1] 11/28/2022 Unknown    Biventricular heart failure with reduced left ventricular function [I50.814] 11/26/2022 Yes    Shock [R57.9] 11/26/2022 Yes    Cardiomyopathy [I42.9] 11/26/2022 Yes    Acute respiratory failure with hypoxia and hypercarbia [J96.01, J96.02] 11/25/2022 Yes    Severe sepsis [A41.9, R65.20] 11/25/2022 Yes    MILY (acute kidney injury) [N17.9] 11/25/2022 Yes    Pneumonia due to influenza A virus [J10.00] 11/25/2022 Yes    COPD exacerbation [J44.1] 11/25/2022 Yes    NSTEMI (non-ST elevated myocardial infarction) [I21.4] 11/25/2022 Yes    Atrial fibrillation with RVR [I48.91] 11/25/2022 Yes    D-dimer, elevated [R79.89] 11/25/2022 Yes    Hernia of abdominal wall [K43.9] 10/13/2022 Yes    HLD  (hyperlipidemia) [E78.5] 2019 Yes    HTN (hypertension) [I10] 2019 Yes    Drug abuse [F19.10] 2019 Yes      Problems Resolved During this Admission:    Diagnosis Date Noted Date Resolved POA    Influenza [J11.1] 2022 Yes       Discharged Condition:     Disposition:     Follow Up:    Patient Instructions:   No discharge procedures on file.  Medications:  None    MICHELLE Nicholson-ACNP  Pulmonology  [facility]

## 2022-12-03 NOTE — PROGRESS NOTES
Family is here to speak with Kaley Pittman cnp to discuss care of their dad, intubated, unresponsive, anoxic myoclonic tonic jerking

## 2022-12-03 NOTE — SUBJECTIVE & OBJECTIVE
Interval History: remains unresponsive on vent. Myoclonic jerks noted     Objective:     Vital Signs (Most Recent):  Temp: 99.5 °F (37.5 °C) (12/03/22 1415)  Pulse: 91 (12/03/22 1415)  Resp: 18 (12/03/22 1415)  BP: (!) 108/57 (12/03/22 1400)  SpO2: 100 % (12/03/22 1345)   Vital Signs (24h Range):  Temp:  [99 °F (37.2 °C)-101.1 °F (38.4 °C)] 99.5 °F (37.5 °C)  Pulse:  [] 91  Resp:  [17-34] 18  SpO2:  [100 %] 100 %  BP: ()/(46-83) 108/57     Weight: 57.2 kg (126 lb 1.7 oz)  Body mass index is 18.09 kg/m².      Intake/Output Summary (Last 24 hours) at 12/3/2022 1503  Last data filed at 12/3/2022 1306  Gross per 24 hour   Intake 1620.81 ml   Output 900 ml   Net 720.81 ml       Physical Exam  Vitals reviewed.   Constitutional:       Interventions: He is intubated.   HENT:      Head: Normocephalic and atraumatic.      Right Ear: External ear normal.      Left Ear: External ear normal.      Nose: Nose normal.      Mouth/Throat:      Mouth: Mucous membranes are moist.   Eyes:      Conjunctiva/sclera: Conjunctivae normal.   Cardiovascular:      Rate and Rhythm: Normal rate and regular rhythm.      Heart sounds: Normal heart sounds. No murmur heard.  Pulmonary:      Effort: Pulmonary effort is normal. He is intubated.      Breath sounds: Normal breath sounds.   Abdominal:      Palpations: Abdomen is soft.   Musculoskeletal:      Right lower leg: No edema.      Left lower leg: No edema.   Skin:     General: Skin is warm and dry.   Neurological:      Mental Status: He is unresponsive.      Motor: Tremor present.       Vents:  Vent Mode: A/C (12/03/22 1100)  Ventilator Initiated: Yes (11/25/22 1730)  Set Rate: 18 BPM (12/03/22 1100)  Vt Set: 550 mL (12/03/22 1100)  PEEP/CPAP: 5 cmH20 (12/03/22 1100)  Oxygen Concentration (%): 40 (12/03/22 1115)  Peak Airway Pressure: 29 cmH20 (12/03/22 1100)  Total Ve: 9.9 L/m (12/03/22 1100)  F/VT Ratio<105 (RSBI): (!) 34.62 (12/03/22 1100)    Lines/Drains/Airways       Drain   Duration                  NG/OG Tube 11/25/22 2330 Center mouth 7 days         Urethral Catheter 11/25/22 1842 Temperature probe 16 Fr. 7 days              Airway  Duration                  Airway - Non-Surgical 11/25/22 8 days              Peripheral Intravenous Line  Duration                  External Jugular IV 11/25/22 1834 7 days         Peripheral IV - Single Lumen 11/27/22 2230 16 G Anterior;Right Forearm 5 days         Peripheral IV - Single Lumen 11/27/22 2235 18 G Anterior;Proximal;Right Forearm 5 days         Peripheral IV - Single Lumen 11/28/22 1600 20 G Posterior;Right Forearm 4 days                    Significant Labs:    CBC/Anemia Profile:  Recent Labs   Lab 12/02/22 0444 12/03/22  0420   WBC 14.72* 14.24*   HGB 7.4* 6.2*   HCT 22.3* 19.7*    257   MCV 89.6 93.4   RDW 14.6* 14.6*        Chemistries:  Recent Labs   Lab 12/02/22 0444 12/02/22  1107 12/02/22  1543 12/02/22  2335 12/03/22  0420 12/03/22  0833     --   --   --  133*  --    K 3.8  --   --   --  3.8  --    CL 96*  --   --   --  93*  --    CO2 34*  --   --   --  34*  --    BUN 63*  --   --   --  75*  --    CREATININE 3.21*  --   --   --  3.34*  --    CALCIUM 8.8  --   --   --  8.9  --    ALBUMIN 2.8*  --   --   --  2.6*  --    PROT 6.4  --   --   --  6.4  --    BILITOT 0.5  --   --   --  0.5  --    ALKPHOS 73  --   --   --  78  --    ALT 59  --   --   --  49  --    AST 94*  --   --   --  65*  --    MG  --    < > 2.4* 2.3  --  2.6*   PHOS  --    < > 3.7 3.2  --  3.8    < > = values in this interval not displayed.       All pertinent labs within the past 24 hours have been reviewed.    Significant Imaging:  I have reviewed all pertinent imaging results/findings within the past 24 hours.

## 2022-12-05 NOTE — PLAN OF CARE
[facility] ICU  Discharge Final Note    Primary Care Provider: Cornelia Weiss MD    Expected Discharge Date:     Final Discharge Note (most recent)       Final Note - 22 1046          Final Note    Assessment Type Final Discharge Note     Anticipated Discharge Disposition         Post-Acute Status    Discharge Delays None known at this time                     Important Message from Medicare  Important Message from Medicare regarding Discharge Appeal Rights: (P) Given to patient/caregiver, Explained to patient/caregiver, Signed/date by patient/caregiver     Date IMM was signed: (P) 22  Time IMM was signed: (P) [id number]      Pt

## 2024-08-23 NOTE — PLAN OF CARE
Problem: Adult Inpatient Plan of Care  Goal: Plan of Care Review  Outcome: Ongoing, Progressing  Goal: Patient-Specific Goal (Individualized)  Outcome: Ongoing, Progressing  Goal: Absence of Hospital-Acquired Illness or Injury  Outcome: Ongoing, Progressing  Goal: Optimal Comfort and Wellbeing  Outcome: Ongoing, Progressing  Goal: Readiness for Transition of Care  Outcome: Ongoing, Progressing     Problem: Communication Impairment (Mechanical Ventilation, Invasive)  Goal: Effective Communication  Outcome: Ongoing, Progressing     Problem: Device-Related Complication Risk (Mechanical Ventilation, Invasive)  Goal: Optimal Device Function  Outcome: Ongoing, Progressing     Problem: Inability to Wean (Mechanical Ventilation, Invasive)  Goal: Mechanical Ventilation Liberation  Outcome: Ongoing, Progressing     Problem: Nutrition Impairment (Mechanical Ventilation, Invasive)  Goal: Optimal Nutrition Delivery  Outcome: Ongoing, Progressing     Problem: Skin and Tissue Injury (Mechanical Ventilation, Invasive)  Goal: Absence of Device-Related Skin and Tissue Injury  Outcome: Ongoing, Progressing     Problem: Ventilator-Induced Lung Injury (Mechanical Ventilation, Invasive)  Goal: Absence of Ventilator-Induced Lung Injury  Outcome: Ongoing, Progressing     Problem: Communication Impairment (Artificial Airway)  Goal: Effective Communication  Outcome: Ongoing, Progressing     Problem: Device-Related Complication Risk (Artificial Airway)  Goal: Optimal Device Function  Outcome: Ongoing, Progressing     Problem: Skin and Tissue Injury (Artificial Airway)  Goal: Absence of Device-Related Skin or Tissue Injury  Outcome: Ongoing, Progressing     Problem: Noninvasive Ventilation Acute  Goal: Effective Unassisted Ventilation and Oxygenation  Outcome: Ongoing, Progressing     Problem: Adjustment to Illness (Sepsis/Septic Shock)  Goal: Optimal Coping  Outcome: Ongoing, Progressing     Problem: Bleeding (Sepsis/Septic Shock)  Goal:   Stable on vitamin D supplements   Absence of Bleeding  Outcome: Ongoing, Progressing     Problem: Glycemic Control Impaired (Sepsis/Septic Shock)  Goal: Blood Glucose Level Within Desired Range  Outcome: Ongoing, Progressing     Problem: Infection Progression (Sepsis/Septic Shock)  Goal: Absence of Infection Signs and Symptoms  Outcome: Ongoing, Progressing     Problem: Nutrition Impaired (Sepsis/Septic Shock)  Goal: Optimal Nutrition Intake  Outcome: Ongoing, Progressing     Problem: Fluid and Electrolyte Imbalance (Acute Kidney Injury/Impairment)  Goal: Fluid and Electrolyte Balance  Outcome: Ongoing, Progressing     Problem: Oral Intake Inadequate (Acute Kidney Injury/Impairment)  Goal: Optimal Nutrition Intake  Outcome: Ongoing, Progressing     Problem: Renal Function Impairment (Acute Kidney Injury/Impairment)  Goal: Effective Renal Function  Outcome: Ongoing, Progressing     Problem: Infection  Goal: Absence of Infection Signs and Symptoms  Outcome: Ongoing, Progressing     Problem: Skin Injury Risk Increased  Goal: Skin Health and Integrity  Outcome: Ongoing, Progressing

## 2025-04-17 NOTE — ASSESSMENT & PLAN NOTE
Echo    Interpretation Summary  · Atrial fibrillation observed.  · The left ventricle is normal in size with mild concentric hypertrophy and severely decreased systolic function.  · The estimated ejection fraction is 25%.  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  · Severe right atrial enlargement.  · Moderate-to-severe mitral regurgitation.  · Mechanically ventilated; cannot use inferior caval vein diameter to estimate central venous pressure.    Continue lasix   Plans for [facility] tomorrow    [Rectal Prolapse] : no [Unable To Restrain Bowel Movement] : no [Urinary Frequency] : no [Feelings Of Urinary Urgency] : no s/p mechanical fall, will eval acute fx w/ xray and dc if WNL. [Urinary Frequency More Than Twice At Night (Nocturia)] : no nocturia [Constipation Obstructed Defecation] : no [[email or web address]] : + menses [de-identified] : + sexually active - no issues

## (undated) DEVICE — DECANTER FLUID TRNSF WHITE 9IN

## (undated) DEVICE — GLOVE BIOGEL SKINSENSE PI 8.0

## (undated) DEVICE — PAD CAST SPECIALIST STRL 3

## (undated) DEVICE — GLOVE 8 PROTEXIS PI BLUE

## (undated) DEVICE — DRESSING TRANS 4X4 TEGADERM

## (undated) DEVICE — ADPT IV NDLLSS M LL CLRLNK

## (undated) DEVICE — CAST SMALL OR FROM CAST CART

## (undated) DEVICE — INTRODUCER KIT MICRO 4FR

## (undated) DEVICE — APPLICATOR CHLORAPREP ORN 26ML

## (undated) DEVICE — DRAPE ANGIO BRACH 38X44IN

## (undated) DEVICE — SLING ARM LARGE FOAM STRAP

## (undated) DEVICE — GLOVE 7.5 PROTEXIS PI BLUE

## (undated) DEVICE — SOL NACL IRR 1000ML BTL

## (undated) DEVICE — SOCKINETTE DOUBLE PLY 4X48IN

## (undated) DEVICE — SUT VICRYL 2-0 36 CT-1

## (undated) DEVICE — GLOVE BIOGEL SKINSENSE PI 6.5

## (undated) DEVICE — CATH SWAN GANZ STND 7FR

## (undated) DEVICE — COVER PROBE WITH BAND 6X96IN

## (undated) DEVICE — BOWL STERILE LARGE 32OZ

## (undated) DEVICE — SHEATH INTRODUCER 7FR 11CM

## (undated) DEVICE — GOWN POLY REINF BRTH SLV XL

## (undated) DEVICE — DRESSING TELFA PAD N ADH 2X3

## (undated) DEVICE — SUT 2/0 30IN SILK BLK BRAI

## (undated) DEVICE — SKIN STAPLER PMR35

## (undated) DEVICE — CHLORAPREP 10.5 ML APPLICATOR

## (undated) DEVICE — SUT ETHILON 4-0 PS2 18 BLK

## (undated) DEVICE — GLOVE PROTEXIS PI CRM 6.5

## (undated) DEVICE — GLOVE BIOGEL SKINSENSE PI 7.0

## (undated) DEVICE — BANDAGE ESMARK 4INX3YD

## (undated) DEVICE — DRAPE THREE-QTR REINF 53X77IN

## (undated) DEVICE — Device

## (undated) DEVICE — GLOVE SURG ULTRA TOUCH 6

## (undated) DEVICE — GLOVE SURG BIOGEL LATEX SZ 7.5